# Patient Record
Sex: MALE | Race: WHITE | NOT HISPANIC OR LATINO | Employment: OTHER | URBAN - METROPOLITAN AREA
[De-identification: names, ages, dates, MRNs, and addresses within clinical notes are randomized per-mention and may not be internally consistent; named-entity substitution may affect disease eponyms.]

---

## 2017-02-03 ENCOUNTER — ALLSCRIPTS OFFICE VISIT (OUTPATIENT)
Dept: OTHER | Facility: OTHER | Age: 62
End: 2017-02-03

## 2017-02-10 ENCOUNTER — GENERIC CONVERSION - ENCOUNTER (OUTPATIENT)
Dept: OTHER | Facility: OTHER | Age: 62
End: 2017-02-10

## 2017-03-10 ENCOUNTER — GENERIC CONVERSION - ENCOUNTER (OUTPATIENT)
Dept: OTHER | Facility: OTHER | Age: 62
End: 2017-03-10

## 2017-03-17 ENCOUNTER — GENERIC CONVERSION - ENCOUNTER (OUTPATIENT)
Dept: OTHER | Facility: OTHER | Age: 62
End: 2017-03-17

## 2017-04-21 ENCOUNTER — GENERIC CONVERSION - ENCOUNTER (OUTPATIENT)
Dept: OTHER | Facility: OTHER | Age: 62
End: 2017-04-21

## 2017-05-05 ENCOUNTER — ALLSCRIPTS OFFICE VISIT (OUTPATIENT)
Dept: OTHER | Facility: OTHER | Age: 62
End: 2017-05-05

## 2017-06-03 DIAGNOSIS — I48.91 ATRIAL FIBRILLATION (HCC): ICD-10-CM

## 2017-06-03 DIAGNOSIS — G47.33 OBSTRUCTIVE SLEEP APNEA: ICD-10-CM

## 2017-06-03 DIAGNOSIS — Z00.00 ENCOUNTER FOR GENERAL ADULT MEDICAL EXAMINATION WITHOUT ABNORMAL FINDINGS: ICD-10-CM

## 2017-06-03 DIAGNOSIS — Z86.39 PERSONAL HISTORY OF OTHER ENDOCRINE, NUTRITIONAL AND METABOLIC DISEASE: ICD-10-CM

## 2017-06-03 DIAGNOSIS — E78.5 HYPERLIPIDEMIA: ICD-10-CM

## 2017-06-03 DIAGNOSIS — I10 ESSENTIAL (PRIMARY) HYPERTENSION: ICD-10-CM

## 2017-07-07 ENCOUNTER — ALLSCRIPTS OFFICE VISIT (OUTPATIENT)
Dept: OTHER | Facility: OTHER | Age: 62
End: 2017-07-07

## 2017-07-07 DIAGNOSIS — D64.9 ANEMIA: ICD-10-CM

## 2017-07-07 DIAGNOSIS — R05.9 COUGH: ICD-10-CM

## 2017-07-08 ENCOUNTER — APPOINTMENT (OUTPATIENT)
Dept: LAB | Facility: CLINIC | Age: 62
End: 2017-07-08
Payer: COMMERCIAL

## 2017-07-08 DIAGNOSIS — D64.9 ANEMIA: ICD-10-CM

## 2017-07-08 LAB
BASOPHILS # BLD AUTO: 0.07 THOUSANDS/ΜL (ref 0–0.1)
BASOPHILS NFR BLD AUTO: 1 % (ref 0–1)
EOSINOPHIL # BLD AUTO: 0.17 THOUSAND/ΜL (ref 0–0.61)
EOSINOPHIL NFR BLD AUTO: 3 % (ref 0–6)
ERYTHROCYTE [DISTWIDTH] IN BLOOD BY AUTOMATED COUNT: 14.9 % (ref 11.6–15.1)
FERRITIN SERPL-MCNC: 9 NG/ML (ref 8–388)
HCT VFR BLD AUTO: 40.5 % (ref 36.5–49.3)
HEMOCCULT STL QL IA: NEGATIVE
HGB BLD-MCNC: 13.1 G/DL (ref 12–17)
IRON SERPL-MCNC: 73 UG/DL (ref 65–175)
LYMPHOCYTES # BLD AUTO: 2.24 THOUSANDS/ΜL (ref 0.6–4.47)
LYMPHOCYTES NFR BLD AUTO: 33 % (ref 14–44)
MCH RBC QN AUTO: 27.2 PG (ref 26.8–34.3)
MCHC RBC AUTO-ENTMCNC: 32.3 G/DL (ref 31.4–37.4)
MCV RBC AUTO: 84 FL (ref 82–98)
MONOCYTES # BLD AUTO: 0.46 THOUSAND/ΜL (ref 0.17–1.22)
MONOCYTES NFR BLD AUTO: 7 % (ref 4–12)
NEUTROPHILS # BLD AUTO: 3.84 THOUSANDS/ΜL (ref 1.85–7.62)
NEUTS SEG NFR BLD AUTO: 56 % (ref 43–75)
NRBC BLD AUTO-RTO: 0 /100 WBCS
PLATELET # BLD AUTO: 333 THOUSANDS/UL (ref 149–390)
PMV BLD AUTO: 10.4 FL (ref 8.9–12.7)
RBC # BLD AUTO: 4.82 MILLION/UL (ref 3.88–5.62)
WBC # BLD AUTO: 6.8 THOUSAND/UL (ref 4.31–10.16)

## 2017-07-08 PROCEDURE — 36415 COLL VENOUS BLD VENIPUNCTURE: CPT

## 2017-07-08 PROCEDURE — G0328 FECAL BLOOD SCRN IMMUNOASSAY: HCPCS

## 2017-07-08 PROCEDURE — 85025 COMPLETE CBC W/AUTO DIFF WBC: CPT

## 2017-07-08 PROCEDURE — 83540 ASSAY OF IRON: CPT

## 2017-07-08 PROCEDURE — 82728 ASSAY OF FERRITIN: CPT

## 2017-07-12 ENCOUNTER — TRANSCRIBE ORDERS (OUTPATIENT)
Dept: MRI IMAGING | Facility: CLINIC | Age: 62
End: 2017-07-12

## 2017-07-12 ENCOUNTER — APPOINTMENT (OUTPATIENT)
Dept: RADIOLOGY | Facility: CLINIC | Age: 62
End: 2017-07-12
Payer: COMMERCIAL

## 2017-07-12 DIAGNOSIS — R05.9 COUGH: ICD-10-CM

## 2017-07-12 PROCEDURE — 71020 HB CHEST X-RAY 2VW FRONTAL&LATL: CPT

## 2017-07-21 ENCOUNTER — GENERIC CONVERSION - ENCOUNTER (OUTPATIENT)
Dept: OTHER | Facility: OTHER | Age: 62
End: 2017-07-21

## 2017-09-20 ENCOUNTER — GENERIC CONVERSION - ENCOUNTER (OUTPATIENT)
Dept: OTHER | Facility: OTHER | Age: 62
End: 2017-09-20

## 2017-10-06 RX ORDER — OMEPRAZOLE 40 MG/1
40 CAPSULE, DELAYED RELEASE ORAL DAILY
COMMUNITY
End: 2018-11-30 | Stop reason: SDUPTHER

## 2017-10-06 RX ORDER — TRAMADOL HYDROCHLORIDE 50 MG/1
50 TABLET ORAL EVERY 6 HOURS PRN
COMMUNITY
End: 2021-10-28 | Stop reason: SDUPTHER

## 2017-10-06 RX ORDER — ATORVASTATIN CALCIUM 40 MG/1
40 TABLET, FILM COATED ORAL DAILY
COMMUNITY
End: 2018-04-09 | Stop reason: SDUPTHER

## 2017-10-06 RX ORDER — FLECAINIDE ACETATE 100 MG/1
100 TABLET ORAL 2 TIMES DAILY
COMMUNITY

## 2017-10-06 RX ORDER — FEXOFENADINE HCL 180 MG/1
180 TABLET ORAL DAILY
COMMUNITY
End: 2018-04-09 | Stop reason: SDUPTHER

## 2017-10-06 RX ORDER — METAXALONE 800 MG/1
800 TABLET ORAL 3 TIMES DAILY
COMMUNITY
End: 2018-08-20 | Stop reason: SDUPTHER

## 2017-10-06 RX ORDER — ALPRAZOLAM 0.25 MG/1
TABLET ORAL
COMMUNITY
End: 2018-04-09 | Stop reason: SDUPTHER

## 2017-10-06 RX ORDER — FENOFIBRATE 145 MG/1
145 TABLET, COATED ORAL DAILY
COMMUNITY
End: 2018-04-09 | Stop reason: SDUPTHER

## 2017-10-06 RX ORDER — ESCITALOPRAM OXALATE 10 MG/1
5 TABLET ORAL DAILY
COMMUNITY
End: 2018-01-27 | Stop reason: SDUPTHER

## 2017-10-06 RX ORDER — DILTIAZEM HYDROCHLORIDE 120 MG/1
120 TABLET, FILM COATED ORAL EVERY 6 HOURS SCHEDULED
COMMUNITY
End: 2018-04-09 | Stop reason: SDUPTHER

## 2017-10-09 ENCOUNTER — ANESTHESIA EVENT (OUTPATIENT)
Dept: PERIOP | Facility: HOSPITAL | Age: 62
End: 2017-10-09
Payer: COMMERCIAL

## 2017-10-09 NOTE — ANESTHESIA PREPROCEDURE EVALUATION
Review of Systems/Medical History  Patient summary reviewed  Chart reviewed      Cardiovascular  Exercise tolerance: good,  Hyperlipidemia, Hypertension controlled, Dysrhythmias, atrial fibrillation,    Pulmonary  Sleep apnea CPAP, ,        GI/Hepatic    GERD ,        Negative  ROS        Endo/Other  Negative endo/other ROS Diabetes well controlled type 2 ,      GYN       Hematology  Negative hematology ROS      Musculoskeletal  Negative musculoskeletal ROS        Neurology  Negative neurology ROS      Psychology   Depression ,            Physical Exam    Airway    Mallampati score: II  TM Distance: >3 FB  Neck ROM: full     Dental   Comment: Some missing without loss    ,     Cardiovascular  Rhythm: regular, Rate: normal,     Pulmonary  Breath sounds clear to auscultation,     Other Findings        Anesthesia Plan  ASA Score- 3       Anesthesia Type- IV sedation with anesthesia        Induction- intravenous      Informed Consent  Anesthetic plan and risks discussed with patient

## 2017-10-10 ENCOUNTER — ANESTHESIA (OUTPATIENT)
Dept: PERIOP | Facility: HOSPITAL | Age: 62
End: 2017-10-10
Payer: COMMERCIAL

## 2017-10-10 ENCOUNTER — GENERIC CONVERSION - ENCOUNTER (OUTPATIENT)
Dept: OTHER | Facility: OTHER | Age: 62
End: 2017-10-10

## 2017-10-10 ENCOUNTER — HOSPITAL ENCOUNTER (OUTPATIENT)
Facility: HOSPITAL | Age: 62
Setting detail: OUTPATIENT SURGERY
Discharge: HOME | End: 2017-10-10
Attending: INTERNAL MEDICINE | Admitting: INTERNAL MEDICINE
Payer: COMMERCIAL

## 2017-10-10 VITALS
DIASTOLIC BLOOD PRESSURE: 73 MMHG | HEART RATE: 61 BPM | RESPIRATION RATE: 27 BRPM | SYSTOLIC BLOOD PRESSURE: 131 MMHG | TEMPERATURE: 97.6 F | BODY MASS INDEX: 31.98 KG/M2 | WEIGHT: 210.98 LBS | HEIGHT: 68 IN | OXYGEN SATURATION: 97 %

## 2017-10-10 LAB — GLUCOSE SERPL-MCNC: 98 MG/DL (ref 65–140)

## 2017-10-10 PROCEDURE — 82948 REAGENT STRIP/BLOOD GLUCOSE: CPT

## 2017-10-10 RX ORDER — SODIUM CHLORIDE, SODIUM LACTATE, POTASSIUM CHLORIDE, CALCIUM CHLORIDE 600; 310; 30; 20 MG/100ML; MG/100ML; MG/100ML; MG/100ML
125 INJECTION, SOLUTION INTRAVENOUS CONTINUOUS
Status: DISCONTINUED | OUTPATIENT
Start: 2017-10-10 | End: 2017-10-10 | Stop reason: HOSPADM

## 2017-10-10 RX ORDER — LIDOCAINE HYDROCHLORIDE 10 MG/ML
INJECTION, SOLUTION INFILTRATION; PERINEURAL AS NEEDED
Status: DISCONTINUED | OUTPATIENT
Start: 2017-10-10 | End: 2017-10-10 | Stop reason: SURG

## 2017-10-10 RX ORDER — SODIUM CHLORIDE, SODIUM LACTATE, POTASSIUM CHLORIDE, CALCIUM CHLORIDE 600; 310; 30; 20 MG/100ML; MG/100ML; MG/100ML; MG/100ML
100 INJECTION, SOLUTION INTRAVENOUS CONTINUOUS
Status: DISCONTINUED | OUTPATIENT
Start: 2017-10-10 | End: 2017-10-10 | Stop reason: HOSPADM

## 2017-10-10 RX ORDER — SODIUM CHLORIDE, SODIUM LACTATE, POTASSIUM CHLORIDE, CALCIUM CHLORIDE 600; 310; 30; 20 MG/100ML; MG/100ML; MG/100ML; MG/100ML
INJECTION, SOLUTION INTRAVENOUS CONTINUOUS PRN
Status: DISCONTINUED | OUTPATIENT
Start: 2017-10-10 | End: 2017-10-10 | Stop reason: SURG

## 2017-10-10 RX ORDER — PROPOFOL 10 MG/ML
INJECTION, EMULSION INTRAVENOUS AS NEEDED
Status: DISCONTINUED | OUTPATIENT
Start: 2017-10-10 | End: 2017-10-10 | Stop reason: SURG

## 2017-10-10 RX ADMIN — PROPOFOL 30 MG: 10 INJECTION, EMULSION INTRAVENOUS at 08:53

## 2017-10-10 RX ADMIN — SODIUM CHLORIDE, SODIUM LACTATE, POTASSIUM CHLORIDE, AND CALCIUM CHLORIDE: .6; .31; .03; .02 INJECTION, SOLUTION INTRAVENOUS at 08:45

## 2017-10-10 RX ADMIN — PROPOFOL 120 MG: 10 INJECTION, EMULSION INTRAVENOUS at 08:50

## 2017-10-10 RX ADMIN — LIDOCAINE HYDROCHLORIDE 50 MG: 10 INJECTION, SOLUTION INFILTRATION; PERINEURAL at 08:50

## 2017-10-10 NOTE — OP NOTE
**** GI/ENDOSCOPY REPORT ****     PATIENT NAME: Anuja Armstrong - VISIT ID:  Patient ID:   [id number] YOB: 1955     INTRODUCTION: Esophagogastroduodenoscopy - A 58 male patient presents for   an outpatient Esophagogastroduodenoscopy at [facility]  INDICATIONS: GERD  CONSENT: The benefits, risks, and alternatives to the procedure were   discussed and informed consent was obtained from the patient  PREPARATION:  EKG, pulse, pulse oximetry and blood pressure were monitored   throughout the procedure  MEDICATIONS:asa 2     PROCEDURE:  The endoscope was passed without difficulty through the mouth   under direct visualization and advanced to the 3rd portion of the   duodenum  The scope was withdrawn and the mucosa was carefully examined  FINDINGS:  The EGD examination was completely normal   Esophagus: The   esophagus appeared to be normal  The Z line was visualized at 42 cm from   the entry site  Stomach: The antrum, body of the stomach, cardia, fundus,   incisura, and pylorus appeared to be normal   Duodenum: The duodenal bulb,   2nd portion of the duodenum, and 3rd portion of the duodenum appeared to   be normal      COMPLICATIONS: There were no complications  IMPRESSIONS: Normal EGD  Normal esophagus  Z line visualized  Normal   antrum, body of the stomach, cardia, fundus, incisura, and pylorus  Normal   duodenal bulb, 2nd portion of the duodenum, and 3rd portion of the   duodenum  RECOMMENDATIONS: Continue current medications  ESTIMATED BLOOD LOSS: None  PATHOLOGY SPECIMENS: No     PROCEDURE CODES: 90264 - EGD flexible; incl brushing or washing     ICD-9 Codes: 530 81 Esophageal reflux     ICD-10 Codes: K21 Gastro-esophageal reflux disease     PERFORMED BY: Dr Cydney Hilt, M D [place] [facility]  on 10/10/2017  Version 1, electronically signed by JOVANI Soler , D O  on   10/10/2017 at 08:56

## 2017-10-10 NOTE — ANESTHESIA POSTPROCEDURE EVALUATION
Post-Op Assessment Note      CV Status:  Stable    Mental Status:  Alert and awake    Hydration Status:  Euvolemic    PONV Controlled:  Controlled    Airway Patency:  Patent    Post Op Vitals Reviewed: Yes          Staff: CRNA           /56 (10/10/17 0854)    Temp     Pulse 68 (10/10/17 0854)   Resp 16 (10/10/17 0854)    SpO2 100 % (10/10/17 0854)

## 2017-11-07 DIAGNOSIS — I48.91 ATRIAL FIBRILLATION (HCC): ICD-10-CM

## 2017-11-07 DIAGNOSIS — I10 ESSENTIAL (PRIMARY) HYPERTENSION: ICD-10-CM

## 2017-11-07 DIAGNOSIS — R05.9 COUGH: ICD-10-CM

## 2017-11-07 DIAGNOSIS — E78.5 HYPERLIPIDEMIA: ICD-10-CM

## 2017-11-07 DIAGNOSIS — Z00.00 ENCOUNTER FOR GENERAL ADULT MEDICAL EXAMINATION WITHOUT ABNORMAL FINDINGS: ICD-10-CM

## 2017-11-07 DIAGNOSIS — Z86.39 PERSONAL HISTORY OF OTHER ENDOCRINE, NUTRITIONAL AND METABOLIC DISEASE: ICD-10-CM

## 2017-11-07 DIAGNOSIS — D64.9 ANEMIA: ICD-10-CM

## 2017-11-17 ENCOUNTER — ALLSCRIPTS OFFICE VISIT (OUTPATIENT)
Dept: OTHER | Facility: OTHER | Age: 62
End: 2017-11-17

## 2017-11-18 NOTE — PROGRESS NOTES
Assessment    1  Atrial fibrillation (427 31) (I48 91)   2  Benign essential hypertension (401 1) (I10)   3  Controlled type 2 diabetes mellitus (250 00) (E11 9)   4  Hyperlipidemia (272 4) (E78 5)   5  Anxiety and depression (300 00,311) (F41 8)    Plan  Atrial fibrillation, Benign essential hypertension, Controlled type 2 diabetes mellitus, Hyperlipidemia    · (1) BASIC METABOLIC PROFILE; Status:Active; Requested for:17Mar2018;    · (1) CBC/PLT/DIFF; Status:Active; Requested for:17Mar2018;    · (1) HEMOGLOBIN A1C; Status:Active; Requested for:17Mar2018;    · (1) LIPID PANEL, FASTING; Status:Active; Requested for:17Mar2018;    · Follow-up visit in 4 Months Evaluation and Treatment  Follow-up  Status: Hold For - Scheduling Requested for: 19OBH7487  Benign essential hypertension    · DilTIAZem HCl - 120 MG Oral Tablet; Take 1 tablet daily  Controlled type 2 diabetes mellitus    · From  MetFORMIN HCl - 500 MG Oral Tablet take 1 tablet by mouth twice a day ToMetFORMIN HCl - 500 MG Oral Tablet take one tablet by mouth twice daily  Depression    · Escitalopram Oxalate 10 MG Oral Tablet; Take 1 tablet daily  Esophageal reflux    · Omeprazole 40 MG Oral Capsule Delayed Release; take 1 capsule daily  Health Maintenance    · Fexofenadine HCl - 180 MG Oral Tablet; TAKE 1 TABLET DAILY  Hypercholesterolemia    · Atorvastatin Calcium 40 MG Oral Tablet; TAKE ONE TABLET BY MOUTH ONCE DAILY  Hyperlipidemia    · Fenofibrate 145 MG Oral Tablet (Tricor); TAKE 1 TABLET DAILY    Discussion/Summary  Discussion Summary:   His labs were reviewed  His A1c is 6 3  His LDL cholesterol was below 100  His HDLs are 43  cholesterol is 140   has no cardiac complaints and continues to be followed by the cardiologist   is no longer anticoagulated  see him back in 4 months  He will continue dietary measures  He will continue his metformin  will get a shingles vaccine  Chief Complaint  Chief Complaint Free Text Note Form: Problems are 1  Paroxysmal atrial fibrillation 2  Diabetes 3  Hyperlipidemia 4  Hypertension 5  Anxiety      History of Present Illness  HPI: He is doing fairly well  His cardiac symptoms have abated  He is in sinus rhythm now  sees Dr Velasquez Lynn for his prostate  Review of Systems  Complete-Male:  Constitutional: No fever or chills, feels well, no tiredness, no recent weight gain or weight loss  Eyes: No complaints of eye pain, no red eyes, no discharge from eyes, no itchy eyes  ENT: no complaints of earache, no hearing loss, no nosebleeds, no nasal discharge, no sore throat, no hoarseness  Cardiovascular: No complaints of slow heart rate, no fast heart rate, no chest pain, no palpitations, no leg claudication, no lower extremity  Respiratory: No complaints of shortness of breath, no wheezing, no cough, no SOB on exertion, no orthopnea or PND  Gastrointestinal: No complaints of abdominal pain, no constipation, no nausea or vomiting, no diarrhea or bloody stools  Genitourinary: No complaints of dysuria, no incontinence, no hesitancy, no nocturia, no genital lesion, no testicular pain  Musculoskeletal: No complaints of arthralgia, no myalgias, no joint swelling or stiffness, no limb pain or swelling  Integumentary: No complaints of skin rash or skin lesions, no itching, no skin wound, no dry skin  Neurological: No compliants of headache, no confusion, no convulsions, no numbness or tingling, no dizziness or fainting, no limb weakness, no difficulty walking  Psychiatric: Is not suicidal, no sleep disturbances, no anxiety or depression, no change in personality, no emotional problems  Endocrine: No complaints of proptosis, no hot flashes, no muscle weakness, no erectile dysfunction, no deepening of the voice, no feelings of weakness  Hematologic/Lymphatic: No complaints of swollen glands, no swollen glands in the neck, does not bleed easily, no easy bruising  Active Problems  1   Acute bronchitis (466 0) (J20 9)   2  Anemia (285 9) (D64 9)   3  Atrial fibrillation (427 31) (I48 91)   4  Benign essential hypertension (401 1) (I10)   5  Chronic cough (786 2) (R05)   6  Controlled type 2 diabetes mellitus (250 00) (E11 9)   7  Depression (311) (F32 9)   8  Elevated PSA (790 93) (R97 20)   9  Enlarged prostate without lower urinary tract symptoms (luts) (600 00) (N40 0)   10  Esophageal reflux (530 81) (K21 9)   11  Hypercholesterolemia (272 0) (E78 00)   12  Hyperlipidemia (272 4) (E78 5)   13  Obesity (278 00) (E66 9)   14  LUKE (obstructive sleep apnea) (327 23) (G47 33)   15  LUKE on CPAP (327 23,V46 8) (G47 33,Z99 89)    Past Medical History  1  History of Candidiasis, cutaneous (112 3) (B37 2)   2  History of Colon, diverticulosis (562 10) (K57 30)   3  History of Encounter for screening for malignant neoplasm of colon (V76 51) (Z12 11)   4  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)   5  History of dizziness (V13 89) (Z87 898)   6  History of shortness of breath (V13 89) (Z87 898)   7  History of Internal Hemorrhoids (455 0)   8  History of Pneumonia (V12 61)   9  History of Reported Hearing Problems Using Hearing Aid Both Ears   10  History of Vertigo Of Central Origin (386 2)   11  History of Visit For: Single Organ System Exam Eyes (V72 0)  Active Problems And Past Medical History Reviewed: The active problems and past medical history were reviewed and updated today  Surgical History  Surgical History Reviewed: The surgical history was reviewed and updated today  Family History  Mother    1  Family history of CABG   2  Family history of Coronary Artery Disease (V17 49)   3  Family history of Hepatic Disorders  Father    4  Family history of CABG   5  Family history of Diabetes Mellitus (V18 0)  Family History    6  Family history of Gastrointestinal Cancer  Family History Reviewed: The family history was reviewed and updated today         Social History     · Never A Smoker   · Tobacco non-user (V49 89) (Z78 9)   · Under Stress  Social History Reviewed: The social history was reviewed and updated today  The social history was reviewed and is unchanged  Current Meds   1  ALPRAZolam 0 25 MG Oral Tablet; TAKE 1 TABLET EVERY 12 HOURS AS NEEDED; Therapy: 09DJH7354 to (Last Rx:46Bba8800) Ordered   2  Atorvastatin Calcium 40 MG Oral Tablet; TAKE ONE TABLET BY MOUTH ONCE DAILY; Therapy: 49XJL1788 to (Last Rx:23Zfl8330)  Requested for: 60VIE7975 Ordered   3  Rey Aspirin 325 MG TBEC; One tab daily; Last Rx:72Mgk4210 Ordered   4  DilTIAZem HCl - 120 MG Oral Tablet; Take 1 tablet daily  Requested for: 86PFH4082; Last Rx:14Unt8638 Ordered   5  Escitalopram Oxalate 10 MG Oral Tablet; Take 1 tablet daily; Therapy: 78VQJ7872 to (Evaluate:15Dih6547)  Requested for: 22Qvs2479; Last Rx:53Ujs2302 Ordered   6  Fenofibrate 145 MG Oral Tablet; TAKE 1 TABLET DAILY; Therapy: 33Nvd2140 to (Evaluate:03Jan2018)  Requested for: 64KRZ9603; Last Rx:14Iow2250 Ordered   7  Fexofenadine HCl - 180 MG Oral Tablet; TAKE 1 TABLET DAILY  Requested for: 61RLZ2260; Last Rx:04Mxx8335 Ordered   8  Flecainide Acetate 100 MG Oral Tablet; 1/2 tab bid; Therapy: 75Ccs1494 to (Last Rx:51Jej9626)  Requested for: 52NHR1860 Ordered   9  Meclizine HCl - 25 MG Oral Tablet; TAKE 1 TABLET EVERY 8 TO 12 HOURS AS NEEDED; Therapy: 88BON4652 to (Marquez Deutsch)  Requested for: 85KZV7267; Last Rx:20Grc7532 Ordered   10  Metaxalone 800 MG Oral Tablet; Therapy: 09ONQ0029 to (Evaluate:55Eyr4849) Recorded   11  MetFORMIN HCl - 500 MG Oral Tablet; take 1 tablet by mouth twice a day; Therapy: 57ZKC9147 to (Last Rx:43Wtg0983)  Requested for: 26DAU6412 Ordered   12  Omeprazole 40 MG Oral Capsule Delayed Release; take 1 capsule daily; Therapy: 80SKP3814 to (SRFDILLM:36MNK9032)  Requested for: 12EMS9068; Last Rx:60Efv3246 Ordered   13   TraMADol HCl - 50 MG Oral Tablet; TAKE 1 TO 2 TABLETS EVERY 6 HOURS AS NEEDED FOR PAIN;  Therapy: 10GWQ9320 to (Kamilla Maldonado)  Requested for: 91CDP4589; Last Rx:92Yzj8866  Ordered  Medication List Reviewed: The medication list was reviewed and updated today  Allergies  1  No Known Drug Allergies    Vitals  Vital Signs    Recorded: 08HIS4056 08:22AM   Heart Rate 69   Systolic 015   Diastolic 82   Height 5 ft 8 in   Weight 212 lb    BMI Calculated 32 23   BSA Calculated 2 1   O2 Saturation 99       Physical Exam   Constitutional  General appearance: No acute distress, well appearing and well nourished  Eyes  Conjunctiva and lids: No swelling, erythema, or discharge  Pupils and irises: Equal, round and reactive to light  Ears, Nose, Mouth, and Throat  External inspection of ears and nose: Normal    Otoscopic examination: Tympanic membrance translucent with normal light reflex  Canals patent without erythema  Nasal mucosa, septum, and turbinates: Normal without edema or erythema  Oropharynx: Normal with no erythema, edema, exudate or lesions  Pulmonary  Respiratory effort: No increased work of breathing or signs of respiratory distress  Auscultation of lungs: Clear to auscultation, equal breath sounds bilaterally, no wheezes, no rales, no rhonci  Cardiovascular  Palpation of heart: Normal PMI, no thrills  Auscultation of heart: Normal rate and rhythm, normal S1 and S2, without murmurs  Examination of extremities for edema and/or varicosities: Normal    Carotid pulses: Normal    Abdomen  Abdomen: Non-tender, no masses  Liver and spleen: No hepatomegaly or splenomegaly  Lymphatic  Palpation of lymph nodes in neck: No lymphadenopathy  Musculoskeletal  Gait and station: Normal    Digits and nails: Normal without clubbing or cyanosis  Inspection/palpation of joints, bones, and muscles: Normal    Skin  Skin and subcutaneous tissue: Normal without rashes or lesions  Neurologic  Cranial nerves: Cranial nerves 2-12 intact  Reflexes: 2+ and symmetric  Sensation: No sensory loss     Psychiatric Orientation to person, place and time: Normal    Mood and affect: Normal          Health Management  Health Maintenance   Afluria Intramuscular Suspension; every 1 year; Last 06GLG9029; Next Due: 31FBB8037; Overdue  COLONOSCOPY; every 5 years; Next Due: 22ZOV8744;  Overdue    Signatures   Electronically signed by : Gayatri Caro DO; Nov 17 2017  8:57AM EST                       (Author)

## 2018-01-12 VITALS
HEIGHT: 68 IN | HEART RATE: 69 BPM | WEIGHT: 212 LBS | DIASTOLIC BLOOD PRESSURE: 82 MMHG | SYSTOLIC BLOOD PRESSURE: 136 MMHG | OXYGEN SATURATION: 99 % | BODY MASS INDEX: 32.13 KG/M2

## 2018-01-12 VITALS
DIASTOLIC BLOOD PRESSURE: 80 MMHG | HEIGHT: 66 IN | BODY MASS INDEX: 33.79 KG/M2 | SYSTOLIC BLOOD PRESSURE: 140 MMHG | WEIGHT: 210.25 LBS | HEART RATE: 83 BPM

## 2018-01-13 VITALS
DIASTOLIC BLOOD PRESSURE: 80 MMHG | BODY MASS INDEX: 32.89 KG/M2 | SYSTOLIC BLOOD PRESSURE: 118 MMHG | WEIGHT: 217 LBS | HEART RATE: 74 BPM | HEIGHT: 68 IN

## 2018-01-14 VITALS
SYSTOLIC BLOOD PRESSURE: 130 MMHG | HEIGHT: 68 IN | OXYGEN SATURATION: 98 % | DIASTOLIC BLOOD PRESSURE: 80 MMHG | HEART RATE: 62 BPM | BODY MASS INDEX: 32.74 KG/M2 | WEIGHT: 216 LBS

## 2018-01-27 DIAGNOSIS — F32.A DEPRESSION, UNSPECIFIED DEPRESSION TYPE: Primary | ICD-10-CM

## 2018-01-29 RX ORDER — ESCITALOPRAM OXALATE 10 MG/1
TABLET ORAL
Qty: 90 TABLET | Refills: 0 | Status: SHIPPED | OUTPATIENT
Start: 2018-01-29 | End: 2018-04-09 | Stop reason: SDUPTHER

## 2018-03-17 DIAGNOSIS — E11.9 TYPE 2 DIABETES MELLITUS WITHOUT COMPLICATIONS (HCC): ICD-10-CM

## 2018-03-17 DIAGNOSIS — I48.91 ATRIAL FIBRILLATION (HCC): ICD-10-CM

## 2018-03-17 DIAGNOSIS — I10 ESSENTIAL (PRIMARY) HYPERTENSION: ICD-10-CM

## 2018-03-17 DIAGNOSIS — E78.5 HYPERLIPIDEMIA: ICD-10-CM

## 2018-03-26 ENCOUNTER — OFFICE VISIT (OUTPATIENT)
Dept: INTERNAL MEDICINE CLINIC | Facility: CLINIC | Age: 63
End: 2018-03-26
Payer: COMMERCIAL

## 2018-03-26 ENCOUNTER — HOSPITAL ENCOUNTER (OUTPATIENT)
Dept: NON INVASIVE DIAGNOSTICS | Facility: CLINIC | Age: 63
Discharge: HOME | End: 2018-03-26
Payer: COMMERCIAL

## 2018-03-26 VITALS
OXYGEN SATURATION: 98 % | HEIGHT: 68 IN | HEART RATE: 67 BPM | BODY MASS INDEX: 30.46 KG/M2 | DIASTOLIC BLOOD PRESSURE: 90 MMHG | SYSTOLIC BLOOD PRESSURE: 126 MMHG | RESPIRATION RATE: 18 BRPM | WEIGHT: 201 LBS

## 2018-03-26 DIAGNOSIS — E11.9 CONTROLLED TYPE 2 DIABETES MELLITUS WITHOUT COMPLICATION, WITHOUT LONG-TERM CURRENT USE OF INSULIN (HCC): ICD-10-CM

## 2018-03-26 DIAGNOSIS — R09.89 RIGHT CAROTID BRUIT: ICD-10-CM

## 2018-03-26 DIAGNOSIS — I10 BENIGN ESSENTIAL HYPERTENSION: ICD-10-CM

## 2018-03-26 DIAGNOSIS — F32.A ANXIETY AND DEPRESSION: ICD-10-CM

## 2018-03-26 DIAGNOSIS — F41.9 ANXIETY AND DEPRESSION: ICD-10-CM

## 2018-03-26 DIAGNOSIS — I80.3 PHLEBITIS AND THROMBOPHLEBITIS OF LOWER EXTREMITIES: ICD-10-CM

## 2018-03-26 DIAGNOSIS — R09.89 RIGHT CAROTID BRUIT: Primary | ICD-10-CM

## 2018-03-26 PROCEDURE — 93970 EXTREMITY STUDY: CPT

## 2018-03-26 PROCEDURE — 93970 EXTREMITY STUDY: CPT | Performed by: SURGERY

## 2018-03-26 PROCEDURE — 99214 OFFICE O/P EST MOD 30 MIN: CPT | Performed by: INTERNAL MEDICINE

## 2018-03-27 PROBLEM — F32.A ANXIETY AND DEPRESSION: Status: ACTIVE | Noted: 2017-11-17

## 2018-03-27 PROBLEM — F41.9 ANXIETY AND DEPRESSION: Status: ACTIVE | Noted: 2017-11-17

## 2018-03-27 PROBLEM — E11.9 CONTROLLED TYPE 2 DIABETES MELLITUS (HCC): Status: ACTIVE | Noted: 2017-07-07

## 2018-03-27 LAB
ANION GAP SERPL CALCULATED.3IONS-SCNC: 12.2 MM/L
BASOPHILS # BLD AUTO: 0.1 X3/UL (ref 0–0.3)
BASOPHILS # BLD AUTO: 2 % (ref 0–2)
BUN SERPL-MCNC: 20 MG/DL (ref 7–25)
CALCIUM SERPL-MCNC: 9 MG/DL (ref 8.6–10.5)
CHLORIDE SERPL-SCNC: 107 MM/L (ref 98–107)
CHOLEST SERPL-MCNC: 153 MG/DL (ref 0–200)
CO2 SERPL-SCNC: 27 MM/L (ref 21–31)
CREAT SERPL-MCNC: 1.1 MG/DL (ref 0.7–1.3)
DEPRECATED RDW RBC AUTO: 15.3 % (ref 11.5–14.5)
EGFR (HISTORICAL): > 60 GFR
EGFR AFRICAN AMERICAN (HISTORICAL): > 60 GFR
EOSINOPHIL # BLD AUTO: 0.2 X3/UL (ref 0–0.5)
EOSINOPHIL NFR BLD AUTO: 2.8 % (ref 0–5)
EST. AVERAGE GLUCOSE BLD GHB EST-MCNC: 135 MG/DL
GLUCOSE (HISTORICAL): 112 MG/DL (ref 65–99)
HBA1C MFR BLD HPLC: 6.3 % (ref 4–6.2)
HCT VFR BLD AUTO: 39.1 % (ref 42–52)
HDLC SERPL-MCNC: 41 MG/DL (ref 40–60)
HGB BLD-MCNC: 12.7 G/DL (ref 14–18)
LDLC SERPL CALC-MCNC: 95.9 MG/DL (ref 75–193)
LYMPHOCYTES # BLD AUTO: 2.1 X3/UL (ref 1.2–4.2)
LYMPHOCYTES NFR BLD AUTO: 37.9 % (ref 20.5–51.1)
MCH RBC QN AUTO: 26.5 PG (ref 26–34)
MCHC RBC AUTO-ENTMCNC: 32.5 G/DL (ref 31–36)
MCV RBC AUTO: 81.7 FL (ref 81–99)
MONOCYTES # BLD AUTO: 0.5 X3/UL (ref 0–1)
MONOCYTES NFR BLD AUTO: 8.2 % (ref 1.7–12)
NEUTROPHILS # BLD AUTO: 2.8 X3/UL (ref 1.4–6.5)
NEUTS SEG NFR BLD AUTO: 49.1 % (ref 42.2–75.2)
OSMOLALITY, SERUM (HISTORICAL): 286 MOSM (ref 262–291)
PLATELET # BLD AUTO: 309 X3/UL (ref 130–400)
PMV BLD AUTO: 9.2 FL (ref 8.6–11.7)
POTASSIUM SERPL-SCNC: 4.2 MM/L (ref 3.5–5.5)
RBC # BLD AUTO: 4.79 X6/UL (ref 4.3–5.9)
SODIUM SERPL-SCNC: 142 MM/L (ref 134–143)
TRIGL SERPL-MCNC: 83 MG/DL (ref 44–166)
VLDL CHOLESTEROL (HISTORICAL): 17 MG/DL (ref 5–51)
WBC # BLD AUTO: 5.6 X3/UL (ref 4.8–10.8)

## 2018-03-27 NOTE — PROGRESS NOTES
Assessment/Plan:  Cardiac status seems stable with him being in a regular rhythm at the present time  Of concern was the discomfort in his right leg  He was sent over for an ultrasound which did show superficial phlebitis of his right leg  It appeared to be acute  He is going to continue his Xarelto for the moment  She is going to come back and have me recheck the leg in about 2 weeks  He apparently has never had DVT  He appears to be in sinus rhythm at the present time  No problem-specific Assessment & Plan notes found for this encounter  Diagnoses and all orders for this visit:    Right carotid bruit  -     VAS carotid complete study; Future    Phlebitis and thrombophlebitis of lower extremities (HCC)  -     VAS lower limb venous duplex study, complete bilateral; Future          Subjective:  Problems are multiple and include 1  Right leg pain 2  Hyperlipidemia 3  Depression 4  Cardiac arrhythmia 5  Type 2 diabetes     Patient ID: Rosa Elena Cannon is a 61 y o  male  HPI he comes in for follow-up  He has been having problems with his right leg  He said that several weeks ago it the developed some erythema and tenderness  He has a history of superficial phlebitis of the right leg  He started himself back on Xarelto  It feels better today  He otherwise has been doing fairly well  He has the problems as listed  Blood sugars are said to be under control  He is currently on Xarelto  He has been on that in the past but discontinued it by the cardiologist because of stability of his cardiac arrhythmia  The following portions of the patient's history were reviewed and updated as appropriate: allergies, current medications, past family history, past medical history, past social history, past surgical history and problem list     Review of Systems   Constitutional: Negative for activity change, appetite change, chills, diaphoresis, fatigue, fever and unexpected weight change  Other than his leg pain he has been feeling fairly well  HENT: Negative for congestion, ear pain, hearing loss, mouth sores, nosebleeds, postnasal drip, sinus pain, sinus pressure, sore throat and trouble swallowing  Eyes: Negative for pain, discharge and visual disturbance  Respiratory: Negative for apnea, cough, chest tightness, shortness of breath and wheezing  Cardiovascular: Negative for chest pain, palpitations and leg swelling  He has been having to discomfort in the medial aspect of his right leg both in the calf and now above the knee  It was slightly erythematous  It was tender but feels better now since he started the Xarelto   Gastrointestinal: Negative for abdominal pain, anal bleeding, blood in stool, constipation, diarrhea, nausea and vomiting  Endocrine: Negative for polydipsia and polyphagia  Genitourinary: Negative for decreased urine volume, dysuria, flank pain, frequency, hematuria and urgency  Musculoskeletal: Negative for arthralgias, back pain, gait problem, joint swelling and myalgias  Skin: Negative for rash and wound  Allergic/Immunologic: Negative for environmental allergies and food allergies  Neurological: Negative for dizziness, tremors, seizures, syncope, speech difficulty, light-headedness, numbness and headaches  Hematological: Negative for adenopathy  Does not bruise/bleed easily  Psychiatric/Behavioral: Negative for agitation, confusion, hallucinations, sleep disturbance and suicidal ideas  The patient is not nervous/anxious  Objective:     Physical Exam   Constitutional: He appears well-developed and well-nourished  No distress  HENT:   Head: Normocephalic  Right Ear: External ear normal    Left Ear: External ear normal    Nose: Nose normal    Mouth/Throat: Oropharynx is clear and moist  No oropharyngeal exudate  Eyes: Conjunctivae and EOM are normal  Pupils are equal, round, and reactive to light   Right eye exhibits no discharge  Left eye exhibits no discharge  Neck: Normal range of motion  Neck supple  No thyromegaly present  Cardiovascular: Normal rate, regular rhythm, normal heart sounds and intact distal pulses  Exam reveals no gallop and no friction rub  No murmur heard  There is no tenderness or signs of phlebitis at the present time  Both calves are the same size by measurement  No skin distortion  Pulmonary/Chest: Effort normal and breath sounds normal  No respiratory distress  He has no wheezes  He has no rales  Abdominal: Soft  Bowel sounds are normal  He exhibits no distension and no mass  There is no tenderness  There is no rebound and no guarding  Musculoskeletal: Normal range of motion  He exhibits no edema, tenderness or deformity  Lymphadenopathy:     He has no cervical adenopathy  Neurological: He is alert  He has normal reflexes  No cranial nerve deficit  Coordination normal    Skin: Skin is warm and dry  No rash noted  No erythema  Psychiatric: He has a normal mood and affect  His behavior is normal  Judgment and thought content normal    Nursing note and vitals reviewed

## 2018-03-28 LAB — HBA1C MFR BLD HPLC: 6.3 %

## 2018-04-09 ENCOUNTER — HOSPITAL ENCOUNTER (OUTPATIENT)
Dept: NON INVASIVE DIAGNOSTICS | Facility: CLINIC | Age: 63
Discharge: HOME | End: 2018-04-09
Payer: COMMERCIAL

## 2018-04-09 ENCOUNTER — OFFICE VISIT (OUTPATIENT)
Dept: INTERNAL MEDICINE CLINIC | Facility: CLINIC | Age: 63
End: 2018-04-09
Payer: COMMERCIAL

## 2018-04-09 VITALS
SYSTOLIC BLOOD PRESSURE: 119 MMHG | BODY MASS INDEX: 30.77 KG/M2 | HEIGHT: 68 IN | HEART RATE: 72 BPM | OXYGEN SATURATION: 98 % | DIASTOLIC BLOOD PRESSURE: 70 MMHG | WEIGHT: 203 LBS

## 2018-04-09 DIAGNOSIS — I48.91 ATRIAL FIBRILLATION, UNSPECIFIED TYPE (HCC): ICD-10-CM

## 2018-04-09 DIAGNOSIS — R09.89 CARDIORESPIRATORY PROBLEMS: ICD-10-CM

## 2018-04-09 DIAGNOSIS — F32.A ANXIETY AND DEPRESSION: ICD-10-CM

## 2018-04-09 DIAGNOSIS — I10 BENIGN ESSENTIAL HYPERTENSION: ICD-10-CM

## 2018-04-09 DIAGNOSIS — F32.A DEPRESSION, UNSPECIFIED DEPRESSION TYPE: ICD-10-CM

## 2018-04-09 DIAGNOSIS — E11.9 CONTROLLED TYPE 2 DIABETES MELLITUS WITHOUT COMPLICATION, WITHOUT LONG-TERM CURRENT USE OF INSULIN (HCC): ICD-10-CM

## 2018-04-09 DIAGNOSIS — F41.9 ANXIETY AND DEPRESSION: ICD-10-CM

## 2018-04-09 DIAGNOSIS — E78.00 HIGH CHOLESTEROL: Primary | ICD-10-CM

## 2018-04-09 DIAGNOSIS — I80.9 RECURRENT PHLEBITIS OF SUPERFICIAL VEIN: ICD-10-CM

## 2018-04-09 PROCEDURE — 93880 EXTRACRANIAL BILAT STUDY: CPT

## 2018-04-09 PROCEDURE — 3078F DIAST BP <80 MM HG: CPT | Performed by: INTERNAL MEDICINE

## 2018-04-09 PROCEDURE — 3074F SYST BP LT 130 MM HG: CPT | Performed by: INTERNAL MEDICINE

## 2018-04-09 PROCEDURE — 99214 OFFICE O/P EST MOD 30 MIN: CPT | Performed by: INTERNAL MEDICINE

## 2018-04-09 RX ORDER — FEXOFENADINE HCL 180 MG/1
180 TABLET ORAL DAILY
Qty: 30 TABLET | Refills: 5 | Status: SHIPPED | OUTPATIENT
Start: 2018-04-09 | End: 2020-12-30 | Stop reason: ALTCHOICE

## 2018-04-09 RX ORDER — ATORVASTATIN CALCIUM 40 MG/1
40 TABLET, FILM COATED ORAL DAILY
Qty: 30 TABLET | Refills: 5 | Status: SHIPPED | OUTPATIENT
Start: 2018-04-09 | End: 2019-02-25 | Stop reason: SDUPTHER

## 2018-04-09 RX ORDER — DILTIAZEM HYDROCHLORIDE 120 MG/1
120 TABLET, FILM COATED ORAL EVERY 6 HOURS SCHEDULED
Qty: 30 TABLET | Refills: 5 | Status: SHIPPED | OUTPATIENT
Start: 2018-04-09 | End: 2018-04-09 | Stop reason: SDUPTHER

## 2018-04-09 RX ORDER — DILTIAZEM HYDROCHLORIDE 120 MG/1
120 TABLET, FILM COATED ORAL EVERY 6 HOURS SCHEDULED
Qty: 30 TABLET | Refills: 2 | Status: SHIPPED | OUTPATIENT
Start: 2018-04-09 | End: 2018-08-20 | Stop reason: SDUPTHER

## 2018-04-09 RX ORDER — ESCITALOPRAM OXALATE 10 MG/1
10 TABLET ORAL DAILY
Qty: 30 TABLET | Refills: 5 | Status: SHIPPED | OUTPATIENT
Start: 2018-04-09 | End: 2019-02-25 | Stop reason: SDUPTHER

## 2018-04-09 RX ORDER — FENOFIBRATE 145 MG/1
145 TABLET, COATED ORAL DAILY
Qty: 30 TABLET | Refills: 5 | Status: SHIPPED | OUTPATIENT
Start: 2018-04-09 | End: 2019-02-25 | Stop reason: SDUPTHER

## 2018-04-09 RX ORDER — ALPRAZOLAM 0.25 MG/1
0.25 TABLET ORAL
Qty: 30 TABLET | Refills: 2 | Status: SHIPPED | OUTPATIENT
Start: 2018-04-09 | End: 2019-01-02 | Stop reason: SDUPTHER

## 2018-04-10 PROCEDURE — 93880 EXTRACRANIAL BILAT STUDY: CPT | Performed by: SURGERY

## 2018-04-10 NOTE — PROGRESS NOTES
Assessment/Plan: At the moment the patient is asymptomatic  His tenderness in his right thigh has completely resolved  The ultrasound did not show involvement of the deep venous system yet he has had 3 or 4 bouts of the phlebitis in the last year  He currently placed himself back on Xarelto which she tolerates reasonably well  I told he needs another opinion from a vascular surgeon and I will do some hematological workup including protein S protein C factor 5 Leiden etc     For the moment he will stay on the Xarelto since the thrombus extends up it to the mid thigh  I will see him back in several weeks  No problem-specific Assessment & Plan notes found for this encounter  Diagnoses and all orders for this visit:    High cholesterol  -     atorvastatin (LIPITOR) 40 mg tablet; Take 1 tablet (40 mg total) by mouth daily  -     Discontinue: diltiazem (CARDIZEM) 120 MG tablet; Take 1 tablet (120 mg total) by mouth every 6 (six) hours  -     fexofenadine (ALLEGRA) 180 MG tablet; Take 1 tablet (180 mg total) by mouth daily  -     fenofibrate (TRICOR) 145 mg tablet; Take 1 tablet (145 mg total) by mouth daily  -     diltiazem (CARDIZEM) 120 MG tablet; Take 1 tablet (120 mg total) by mouth every 6 (six) hours    Atrial fibrillation, unspecified type (HCC)  -     Discontinue: diltiazem (CARDIZEM) 120 MG tablet; Take 1 tablet (120 mg total) by mouth every 6 (six) hours  -     fexofenadine (ALLEGRA) 180 MG tablet; Take 1 tablet (180 mg total) by mouth daily  -     fenofibrate (TRICOR) 145 mg tablet; Take 1 tablet (145 mg total) by mouth daily  -     diltiazem (CARDIZEM) 120 MG tablet; Take 1 tablet (120 mg total) by mouth every 6 (six) hours    Depression, unspecified depression type  -     escitalopram (LEXAPRO) 10 mg tablet; Take 1 tablet (10 mg total) by mouth daily    Benign essential hypertension    Recurrent phlebitis of superficial vein  -     rivaroxaban (XARELTO) 15 mg tablet;  Take 1 tablet (15 mg total) by mouth daily with breakfast  -     Protein C and S Activity With Reflex To Protein C and/or S Antigen; Future  -     Factor 5 leiden; Future  -     Ambulatory referral to Vascular Surgery; Future    Controlled type 2 diabetes mellitus without complication, without long-term current use of insulin (HCC)  -     metFORMIN (GLUCOPHAGE) 500 mg tablet; Take 1 tablet (500 mg total) by mouth 2 (two) times a day with meals    Anxiety and depression  -     ALPRAZolam (XANAX) 0 25 mg tablet; Take 1 tablet (0 25 mg total) by mouth daily at bedtime as needed for anxiety    Other orders  -      Colonoscopy          Subjective:  Recurrent superficial phlebitis of the right leg     Patient ID: Matt Pro is a 61 y o  male  HPI he comes back for follow-up  He has had 3 or 4 bouts of superficial phlebitis of the right leg over the last year  When last seen he had plates and cell back on Xarelto because of his concerns  He was seen here sent over for an ultrasound which showed superficial phlebitis from the midthigh down to the mid calf  There was no extension into the deep veinous Hein system  He has been anticoagulated in the past when he had atrial fibrillation  He tolerates Xarelto well    The following portions of the patient's history were reviewed and updated as appropriate: allergies, current medications, past family history, past medical history, past social history, past surgical history and problem list     Review of Systems   Constitutional: Negative for activity change, appetite change, chills, diaphoresis, fatigue, fever and unexpected weight change  HENT: Negative for congestion, ear pain, hearing loss, mouth sores, nosebleeds, postnasal drip, sinus pain, sinus pressure, sore throat and trouble swallowing  Eyes: Negative for pain, discharge and visual disturbance  Respiratory: Negative for apnea, cough, chest tightness, shortness of breath and wheezing      Cardiovascular: Negative for chest pain, palpitations and leg swelling  The discomfort in his right leg has completely resolved  Gastrointestinal: Negative for abdominal pain, anal bleeding, blood in stool, constipation, diarrhea, nausea and vomiting  Endocrine: Negative for polydipsia and polyphagia  Genitourinary: Negative for decreased urine volume, dysuria, flank pain, frequency, hematuria and urgency  Musculoskeletal: Negative for arthralgias, back pain, gait problem, joint swelling and myalgias  Skin: Negative for rash and wound  Allergic/Immunologic: Negative for environmental allergies and food allergies  Neurological: Negative for dizziness, tremors, seizures, syncope, speech difficulty, light-headedness, numbness and headaches  Hematological: Negative for adenopathy  Does not bruise/bleed easily  Psychiatric/Behavioral: Negative for agitation, confusion, hallucinations, sleep disturbance and suicidal ideas  The patient is not nervous/anxious  Objective:     Physical Exam   Constitutional: He appears well-developed and well-nourished  No distress  HENT:   Head: Normocephalic  Right Ear: External ear normal    Left Ear: External ear normal    Nose: Nose normal    Mouth/Throat: Oropharynx is clear and moist  No oropharyngeal exudate  Eyes: Conjunctivae and EOM are normal  Pupils are equal, round, and reactive to light  Right eye exhibits no discharge  Left eye exhibits no discharge  Neck: Normal range of motion  Neck supple  No thyromegaly present  Cardiovascular: Normal rate, regular rhythm, normal heart sounds and intact distal pulses  Exam reveals no gallop and no friction rub  No murmur heard  At the moment he has no tenderness of the superficial veins of the right leg  The leg is completely returned to normal    Pulmonary/Chest: Effort normal and breath sounds normal  No respiratory distress  He has no wheezes  He has no rales  Abdominal: Soft   Bowel sounds are normal  He exhibits no distension and no mass  There is no tenderness  There is no rebound and no guarding  Musculoskeletal: Normal range of motion  He exhibits no edema, tenderness or deformity  Lymphadenopathy:     He has no cervical adenopathy  Neurological: He is alert  He has normal reflexes  No cranial nerve deficit  Coordination normal    Skin: Skin is warm and dry  No rash noted  No erythema  Psychiatric: He has a normal mood and affect  His behavior is normal  Judgment and thought content normal    Nursing note and vitals reviewed

## 2018-04-11 ENCOUNTER — TELEPHONE (OUTPATIENT)
Dept: INTERNAL MEDICINE CLINIC | Facility: CLINIC | Age: 63
End: 2018-04-11

## 2018-04-11 NOTE — TELEPHONE ENCOUNTER
----- Message from Chacha Carver DO sent at 4/10/2018  5:04 PM EDT -----  Call patient    Carotid ultrasound was good

## 2018-04-23 ENCOUNTER — TELEPHONE (OUTPATIENT)
Dept: INTERNAL MEDICINE CLINIC | Facility: CLINIC | Age: 63
End: 2018-04-23

## 2018-04-23 LAB — COMMENT (HISTORICAL): NORMAL

## 2018-04-25 LAB
Lab: 90 % (ref 63–140)
PROTEIN S ANTIGEN FREE (HISTORICAL): 113 % (ref 57–157)
PROTEIN S ANTIGEN TOTAL (HISTORICAL): 78 % (ref 60–150)

## 2018-04-26 LAB — PROTEIN C ANTIGEN (HISTORICAL): 112 % (ref 60–150)

## 2018-05-01 ENCOUNTER — OFFICE VISIT (OUTPATIENT)
Dept: VASCULAR SURGERY | Facility: CLINIC | Age: 63
End: 2018-05-01
Payer: COMMERCIAL

## 2018-05-01 VITALS
RESPIRATION RATE: 18 BRPM | DIASTOLIC BLOOD PRESSURE: 76 MMHG | HEART RATE: 78 BPM | SYSTOLIC BLOOD PRESSURE: 122 MMHG | WEIGHT: 203 LBS | HEIGHT: 68 IN | TEMPERATURE: 97.7 F | BODY MASS INDEX: 30.77 KG/M2

## 2018-05-01 DIAGNOSIS — I80.9 RECURRENT PHLEBITIS OF SUPERFICIAL VEIN: ICD-10-CM

## 2018-05-01 LAB — FACTOR V LEIDEN (HISTORICAL): NORMAL

## 2018-05-01 PROCEDURE — 99243 OFF/OP CNSLTJ NEW/EST LOW 30: CPT | Performed by: SURGERY

## 2018-05-01 NOTE — PROGRESS NOTES
Assessment/Plan:    Recurrent phlebitis of superficial vein    Recurrent thrombophlebitis of the right lower extremity  I reviewed the duplex which shows involvement of the greater saphenous vein  He started taking Xarelto, he had left over Xarelto after his cardiac ablation done a few years ago  At this point his symptoms seemed to have resolved except for some mild areas of soreness  He also had hypercoagulable workup done at Dr Demarco Parisi is office  Some of the results are still pending but there is nothing positive that came back so far  His Factor 5 Leiden, protein C and S levels are normal     If the hypercoagulable workup is negative there is no need for any further anticoagulation  Daily aspirin is recommended  Avoid long trips and prolonged immobilization  Use compression stockings and leg elevation  If he continues to have recurrent episodes we can even consider ablation of the greater saphenous vein and stab phlebectomy of the large varicosities  Diagnoses and all orders for this visit:    Recurrent phlebitis of superficial vein  -     Ambulatory referral to Vascular Surgery          Subjective:      Patient ID: Mike Carrillo is a 61 y o  male  Patient has recurrent thrombophlebitis of his right lower extremity where he has large varicose veins  Patient had a prior cardiac ablation procedure done few years ago and was not Xarelto after that  He has some left over Xarelto was started taking it each time he would have an episode of thrombophlebitis and then it would improve  This is his 3rd or 4th episode in the past 1 year  This episode began about 3 4 weeks ago and is now resolving except for some areas of soreness in the inner part of the leg around the knee  Denies any fevers or chills          The following portions of the patient's history were reviewed and updated as appropriate: allergies, current medications, past family history, past medical history, past social history, past surgical history and problem list     Review of Systems   Constitutional: Negative  HENT: Negative  Eyes: Negative  Respiratory: Negative  Cardiovascular: Positive for leg swelling  Gastrointestinal: Negative  Endocrine: Negative  Genitourinary: Negative  Musculoskeletal: Negative  Skin: Positive for color change  Allergic/Immunologic: Negative  Neurological: Negative  Hematological: Negative  Psychiatric/Behavioral: Negative  Objective:      /76 (BP Location: Left arm, Patient Position: Sitting, Cuff Size: Standard)   Pulse 78   Temp 97 7 °F (36 5 °C)   Resp 18   Ht 5' 8" (1 727 m)   Wt 92 1 kg (203 lb)   BMI 30 87 kg/m²          Physical Exam   Constitutional: He is oriented to person, place, and time  He appears well-developed and well-nourished  No distress  HENT:   Head: Normocephalic and atraumatic  Neck: Normal range of motion  Neck supple  Cardiovascular: Normal rate and regular rhythm  Pulmonary/Chest: Effort normal and breath sounds normal    Abdominal: Soft  Musculoskeletal: Normal range of motion  Mild tenderness around the medial right lower extremity in the  knee region   Neurological: He is alert and oriented to person, place, and time  Skin: Skin is warm and dry  He is not diaphoretic  No erythema  Psychiatric: He has a normal mood and affect  His behavior is normal    Nursing note and vitals reviewed

## 2018-05-01 NOTE — LETTER
May 2, 2018     Corinne Khan, [address] [address]    Patient: Sissy Gil   YOB: 1955   Date of Visit: 5/1/2018       Dear Dr Bakari Frederick: Thank you for referring Sissy Gil to me for evaluation  Below are my notes for this consultation  If you have questions, please do not hesitate to call me  I look forward to following your patient along with you  Sincerely,        Dodie Shelton MD        CC: No Recipients  Dodie Shelton MD  5/2/2018  5:28 PM  Sign at close encounter  Assessment/Plan:    Recurrent phlebitis of superficial vein    Recurrent thrombophlebitis of the right lower extremity  I reviewed the duplex which shows involvement of the greater saphenous vein  He started taking Xarelto, he had left over Xarelto after his cardiac ablation done a few years ago  At this point his symptoms seemed to have resolved except for some mild areas of soreness  He also had hypercoagulable workup done at Dr Bakari Frederick is office  Some of the results are still pending but there is nothing positive that came back so far  His Factor 5 Leiden, protein C and S levels are normal     If the hypercoagulable workup is negative there is no need for any further anticoagulation  Daily aspirin is recommended  Avoid long trips and prolonged immobilization  Use compression stockings and leg elevation  If he continues to have recurrent episodes we can even consider ablation of the greater saphenous vein and stab phlebectomy of the large varicosities  Diagnoses and all orders for this visit:    Recurrent phlebitis of superficial vein  -     Ambulatory referral to Vascular Surgery          Subjective:      Patient ID: Sissy Gil is a 61 y o  male  Patient has recurrent thrombophlebitis of his right lower extremity where he has large varicose veins    Patient had a prior cardiac ablation procedure done few years ago and was not Xarelto after that  He has some left over Xarelto was started taking it each time he would have an episode of thrombophlebitis and then it would improve  This is his 3rd or 4th episode in the past 1 year  This episode began about 3 4 weeks ago and is now resolving except for some areas of soreness in the inner part of the leg around the knee  Denies any fevers or chills  The following portions of the patient's history were reviewed and updated as appropriate: allergies, current medications, past family history, past medical history, past social history, past surgical history and problem list     Review of Systems   Constitutional: Negative  HENT: Negative  Eyes: Negative  Respiratory: Negative  Cardiovascular: Positive for leg swelling  Gastrointestinal: Negative  Endocrine: Negative  Genitourinary: Negative  Musculoskeletal: Negative  Skin: Positive for color change  Allergic/Immunologic: Negative  Neurological: Negative  Hematological: Negative  Psychiatric/Behavioral: Negative  Objective:      /76 (BP Location: Left arm, Patient Position: Sitting, Cuff Size: Standard)   Pulse 78   Temp 97 7 °F (36 5 °C)   Resp 18   Ht 5' 8" (1 727 m)   Wt 92 1 kg (203 lb)   BMI 30 87 kg/m²           Physical Exam   Constitutional: He is oriented to person, place, and time  He appears well-developed and well-nourished  No distress  HENT:   Head: Normocephalic and atraumatic  Neck: Normal range of motion  Neck supple  Cardiovascular: Normal rate and regular rhythm  Pulmonary/Chest: Effort normal and breath sounds normal    Abdominal: Soft  Musculoskeletal: Normal range of motion  Mild tenderness around the medial right lower extremity in the  knee region   Neurological: He is alert and oriented to person, place, and time  Skin: Skin is warm and dry  He is not diaphoretic  No erythema  Psychiatric: He has a normal mood and affect   His behavior is normal    Nursing note and vitals reviewed

## 2018-05-02 NOTE — ASSESSMENT & PLAN NOTE
Recurrent thrombophlebitis of the right lower extremity  I reviewed the duplex which shows involvement of the greater saphenous vein  He started taking Xarelto, he had left over Xarelto after his cardiac ablation done a few years ago  At this point his symptoms seemed to have resolved except for some mild areas of soreness  He also had hypercoagulable workup done at Dr Bakari Frederick is office  Some of the results are still pending but there is nothing positive that came back so far  His Factor 5 Leiden, protein C and S levels are normal     If the hypercoagulable workup is negative there is no need for any further anticoagulation  Daily aspirin is recommended  Avoid long trips and prolonged immobilization  Use compression stockings and leg elevation  If he continues to have recurrent episodes we can even consider ablation of the greater saphenous vein and stab phlebectomy of the large varicosities

## 2018-05-18 ENCOUNTER — OFFICE VISIT (OUTPATIENT)
Dept: INTERNAL MEDICINE CLINIC | Facility: CLINIC | Age: 63
End: 2018-05-18
Payer: COMMERCIAL

## 2018-05-18 VITALS
BODY MASS INDEX: 30.77 KG/M2 | WEIGHT: 203 LBS | HEART RATE: 89 BPM | OXYGEN SATURATION: 97 % | HEIGHT: 68 IN | DIASTOLIC BLOOD PRESSURE: 80 MMHG | SYSTOLIC BLOOD PRESSURE: 138 MMHG

## 2018-05-18 DIAGNOSIS — Z12.5 SCREENING FOR PROSTATE CANCER: ICD-10-CM

## 2018-05-18 DIAGNOSIS — E78.2 MIXED HYPERLIPIDEMIA: ICD-10-CM

## 2018-05-18 DIAGNOSIS — I80.9 SUPERFICIAL PHLEBITIS: ICD-10-CM

## 2018-05-18 DIAGNOSIS — E11.9 CONTROLLED TYPE 2 DIABETES MELLITUS WITHOUT COMPLICATION, WITHOUT LONG-TERM CURRENT USE OF INSULIN (HCC): Primary | ICD-10-CM

## 2018-05-18 PROCEDURE — 99214 OFFICE O/P EST MOD 30 MIN: CPT | Performed by: INTERNAL MEDICINE

## 2018-05-18 NOTE — PROGRESS NOTES
Assessment/Plan:  Regarding his leg issues he is going to wear compression stocking  He is going to take the aspirin and call me if there is any exacerbation of his symptoms  I offered him a Hematology consult but he declines  Otherwise he will continue his other medicines for his hypertension hyperlipidemia  Will see him back in 3 months  No results found for this or any previous visit (from the past 1008 hour(s))  1  Controlled type 2 diabetes mellitus without complication, without long-term current use of insulin (HCC)  Basic metabolic panel    CBC and differential    HEMOGLOBIN A1C W/ EAG ESTIMATION    Microalbumin / creatinine urine ratio    UA (URINE) with reflex to Microscopic   2  Mixed hyperlipidemia  Lipid panel   3  Superficial phlebitis     4  Screening for prostate cancer  PSA Total, Diagnostic       Orders Placed This Encounter   Procedures    Basic metabolic panel    CBC and differential    HEMOGLOBIN A1C W/ EAG ESTIMATION    Lipid panel    PSA Total, Diagnostic    Microalbumin / creatinine urine ratio    UA (URINE) with reflex to Microscopic         Subjective:  Superficial phlebitis of the right leg   Patient ID: Marcy Denise is a 61 y o  male  HPI he comes in for follow-up  He saw the vascular surgeon  Because of the negative hypercoagulability workup it was recommended he stay on aspirin  His leg feels well  He has not had a recurrence  He is off of Xarelto  He is on a full-dose aspirin  Generally feels well  No chest pain no shortness of breath  The following portions of the patient's history were reviewed and updated as appropriate:   He has a past medical history of Atrial fibrillation ([address] ); Candidiasis, cutaneous; Colon, diverticulosis; Depression; Diabetes mellitus ([address] ); Diverticulosis; GERD (gastroesophageal reflux disease); Hyperlipidemia; Hypertension; Internal hemorrhoids; Irregular heart beat; Pneumonia;  Shortness of breath; Sleep apnea; Stomach cancer ([facility] [address] ); and Vertigo of central origin  ,   does not have any pertinent problems on file  ,   has a past surgical history that includes Tonsillectomy; EGD AND COLONOSCOPY; Colonoscopy; and pr esophagogastroduodenoscopy transoral diagnostic (N/A, 10/10/2017)  ,  family history includes Cancer in his family; Coronary artery disease in his mother; Diabetes in his father; Other in his father and mother  ,   reports that he quit smoking about 34 years ago  He has never used smokeless tobacco  He reports that he drinks alcohol  He reports that he does not use drugs  ,  has No Known Allergies       Current Outpatient Prescriptions:     ALPRAZolam (XANAX) 0 25 mg tablet, Take 1 tablet (0 25 mg total) by mouth daily at bedtime as needed for anxiety, Disp: 30 tablet, Rfl: 2    aspirin (ECOTRIN) 325 mg EC tablet, Take 325 mg by mouth every 6 (six) hours as needed for mild pain, Disp: , Rfl:     atorvastatin (LIPITOR) 40 mg tablet, Take 1 tablet (40 mg total) by mouth daily, Disp: 30 tablet, Rfl: 5    diltiazem (CARDIZEM) 120 MG tablet, Take 1 tablet (120 mg total) by mouth every 6 (six) hours, Disp: 30 tablet, Rfl: 2    escitalopram (LEXAPRO) 10 mg tablet, Take 1 tablet (10 mg total) by mouth daily, Disp: 30 tablet, Rfl: 5    fenofibrate (TRICOR) 145 mg tablet, Take 1 tablet (145 mg total) by mouth daily, Disp: 30 tablet, Rfl: 5    fexofenadine (ALLEGRA) 180 MG tablet, Take 1 tablet (180 mg total) by mouth daily, Disp: 30 tablet, Rfl: 5    flecainide (TAMBOCOR) 100 mg tablet, Take 100 mg by mouth 2 (two) times a day, Disp: , Rfl:     meclizine (ANTIVERT) 32 MG tablet, Take 32 mg by mouth 3 (three) times a day as needed for dizziness, Disp: , Rfl:     metaxalone (SKELAXIN) 800 mg tablet, Take 800 mg by mouth 3 (three) times a day, Disp: , Rfl:     metFORMIN (GLUCOPHAGE) 500 mg tablet, Take 1 tablet (500 mg total) by mouth 2 (two) times a day with meals, Disp: 60 tablet, Rfl: 5    omeprazole (PriLOSEC) 40 MG capsule, Take 40 mg by mouth daily, Disp: , Rfl:     rivaroxaban (XARELTO) 15 mg tablet, Take 1 tablet (15 mg total) by mouth daily with breakfast, Disp: 30 tablet, Rfl: 5    traMADol (ULTRAM) 50 mg tablet, Take 50 mg by mouth every 6 (six) hours as needed for moderate pain, Disp: , Rfl:     Review of Systems   Constitutional: Negative for activity change, appetite change, chills, diaphoresis, fatigue, fever and unexpected weight change  HENT: Negative for congestion, ear pain, hearing loss, mouth sores, nosebleeds, postnasal drip, sinus pain, sinus pressure, sore throat and trouble swallowing  Eyes: Negative for pain, discharge and visual disturbance  Respiratory: Negative for apnea, cough, chest tightness, shortness of breath and wheezing  Cardiovascular: Negative for chest pain, palpitations and leg swelling  Gastrointestinal: Negative for abdominal pain, anal bleeding, blood in stool, constipation, diarrhea, nausea and vomiting  Endocrine: Negative for polydipsia and polyphagia  Genitourinary: Negative for decreased urine volume, dysuria, flank pain, frequency, hematuria and urgency  Musculoskeletal: Negative for arthralgias, back pain, gait problem, joint swelling and myalgias  Skin: Negative for rash and wound  Allergic/Immunologic: Negative for environmental allergies and food allergies  Neurological: Negative for dizziness, tremors, seizures, syncope, speech difficulty, light-headedness, numbness and headaches  Hematological: Negative for adenopathy  Does not bruise/bleed easily  Psychiatric/Behavioral: Negative for agitation, confusion, hallucinations, sleep disturbance and suicidal ideas  The patient is not nervous/anxious  Objective:  Vitals:    05/18/18 1052   BP: 138/80   Pulse: 89   SpO2: 97%     Body mass index is 30 87 kg/m²  Physical Exam   Constitutional: He appears well-developed and well-nourished  No distress  HENT:   Head: Normocephalic  Right Ear: External ear normal    Left Ear: External ear normal    Nose: Nose normal    Mouth/Throat: Oropharynx is clear and moist  No oropharyngeal exudate  Eyes: Conjunctivae and EOM are normal  Pupils are equal, round, and reactive to light  Right eye exhibits no discharge  Left eye exhibits no discharge  Neck: Normal range of motion  Neck supple  No thyromegaly present  Cardiovascular: Normal rate, regular rhythm, normal heart sounds and intact distal pulses  Exam reveals no gallop and no friction rub  No murmur heard  Pulmonary/Chest: Effort normal and breath sounds normal  No respiratory distress  He has no wheezes  He has no rales  Abdominal: Soft  Bowel sounds are normal  He exhibits no distension and no mass  There is no tenderness  There is no rebound and no guarding  Musculoskeletal: Normal range of motion  He exhibits no edema, tenderness or deformity  Lymphadenopathy:     He has no cervical adenopathy  Neurological: He is alert  He has normal reflexes  No cranial nerve deficit  Coordination normal    Skin: Skin is warm and dry  No rash noted  No erythema  Psychiatric: He has a normal mood and affect  His behavior is normal  Judgment and thought content normal    Nursing note and vitals reviewed

## 2018-06-25 RX ORDER — FLECAINIDE ACETATE 100 MG/1
TABLET ORAL
Qty: 30 TABLET | Refills: 5 | OUTPATIENT
Start: 2018-06-25

## 2018-07-02 ENCOUNTER — OFFICE VISIT (OUTPATIENT)
Dept: INTERNAL MEDICINE CLINIC | Facility: CLINIC | Age: 63
End: 2018-07-02
Payer: COMMERCIAL

## 2018-07-02 ENCOUNTER — TELEPHONE (OUTPATIENT)
Dept: INTERNAL MEDICINE CLINIC | Facility: CLINIC | Age: 63
End: 2018-07-02

## 2018-07-02 VITALS
HEART RATE: 76 BPM | TEMPERATURE: 99 F | OXYGEN SATURATION: 94 % | BODY MASS INDEX: 30.52 KG/M2 | DIASTOLIC BLOOD PRESSURE: 66 MMHG | HEIGHT: 68 IN | WEIGHT: 201.4 LBS | SYSTOLIC BLOOD PRESSURE: 116 MMHG

## 2018-07-02 DIAGNOSIS — H00.011 HORDEOLUM EXTERNUM OF RIGHT UPPER EYELID: Primary | ICD-10-CM

## 2018-07-02 DIAGNOSIS — H10.31 ACUTE CONJUNCTIVITIS OF RIGHT EYE, UNSPECIFIED ACUTE CONJUNCTIVITIS TYPE: Primary | ICD-10-CM

## 2018-07-02 DIAGNOSIS — H10.31 ACUTE CONJUNCTIVITIS OF RIGHT EYE, UNSPECIFIED ACUTE CONJUNCTIVITIS TYPE: ICD-10-CM

## 2018-07-02 PROCEDURE — 99214 OFFICE O/P EST MOD 30 MIN: CPT | Performed by: PHYSICIAN ASSISTANT

## 2018-07-02 PROCEDURE — 3008F BODY MASS INDEX DOCD: CPT | Performed by: PHYSICIAN ASSISTANT

## 2018-07-02 RX ORDER — METAXALONE 800 MG/1
800 TABLET ORAL 3 TIMES DAILY
Qty: 90 TABLET | Refills: 0 | Status: CANCELLED | OUTPATIENT
Start: 2018-07-02

## 2018-07-02 RX ORDER — TOBRAMYCIN AND DEXAMETHASONE 3; 1 MG/ML; MG/ML
1 SUSPENSION/ DROPS OPHTHALMIC
Qty: 10 ML | Refills: 0 | Status: ON HOLD | OUTPATIENT
Start: 2018-07-02 | End: 2018-08-29

## 2018-07-02 NOTE — TELEPHONE ENCOUNTER
I have no control or knowledge about the cost of eye meds  They do tend to be expensive compared to other meds  I'll send over something else but I have no idea if it's more affordable or not

## 2018-07-02 NOTE — TELEPHONE ENCOUNTER
Patient was in this morning and a eye ointment was ordered for patient and the cost is over $90 00  Patient asked if there was a generic or a substitute that could be called in?     Any questions patient can be reached at

## 2018-07-02 NOTE — PROGRESS NOTES
Assessment/Plan:    Right eye stye/conjunctivitis-  Tobramycin ointment for 7 days  Warm compresses  Good hand hygiene is emphasized  No problem-specific Assessment & Plan notes found for this encounter  Diagnoses and all orders for this visit:    Hordeolum externum of right upper eyelid  -     tobramycin (TOBREX) 0 3 % OINT; Administer 0 5 inches to the right eye 3 (three) times a day    Other orders  -     Cancel: metaxalone (SKELAXIN) 800 mg tablet; Take 1 tablet (800 mg total) by mouth 3 (three) times a day          Subjective:      Patient ID: Karen Bone is a 61 y o  male  Pt comes in c/o of puffiness and a stye in his left eye, lower lash line, last week  This completely resolved and he says it seemed to jump over to his right eye  Now he has a stye in the right, upper inner lash line  The cheek and upper lid are puffy  He woke up with sticking of his lashes  A little itchy, no pain  He used OTC Visine and "blink" eye drops  No other URI sx  No f/c/s  The following portions of the patient's history were reviewed and updated as appropriate: allergies, current medications, past family history, past medical history, past social history, past surgical history and problem list     Review of Systems   Constitutional: Negative for chills and fever  HENT: Negative for congestion, ear pain, postnasal drip, rhinorrhea, sinus pain, sinus pressure, sneezing and sore throat  Eyes: Positive for discharge, itching and visual disturbance  Negative for pain and redness  Respiratory: Negative for cough  Cardiovascular: Negative for chest pain and palpitations  Gastrointestinal: Negative for abdominal pain, diarrhea and nausea           Objective:      /66 (BP Location: Left arm, Patient Position: Sitting, Cuff Size: Standard)   Pulse 76   Temp 99 °F (37 2 °C) (Tympanic) Comment: w/ aspirin  Ht 5' 8" (1 727 m) Comment: w/ shoe denied off  Wt 91 4 kg (201 lb 6 4 oz) Comment: w/ shoe denied off  SpO2 94%   BMI 30 62 kg/m²          Physical Exam   Constitutional: He appears well-developed and well-nourished  HENT:   Head: Normocephalic and atraumatic  Right Ear: External ear normal    Left Ear: External ear normal    Mouth/Throat: Oropharynx is clear and moist  No oropharyngeal exudate  Eyes: Right eye exhibits hordeolum  Left eye exhibits no hordeolum  Right conjunctiva is not injected  Right conjunctiva has no hemorrhage  Left conjunctiva is not injected  Left conjunctiva has no hemorrhage  Right eye has some puffiness of the top lid and beneath the right eye over the cheek  No active d/c right now  No erythema   Cardiovascular: Normal rate, regular rhythm and normal heart sounds  Pulmonary/Chest: Effort normal and breath sounds normal    Abdominal: Soft  Bowel sounds are normal  There is no tenderness

## 2018-08-02 ENCOUNTER — APPOINTMENT (OUTPATIENT)
Dept: LAB | Facility: CLINIC | Age: 63
End: 2018-08-02
Payer: COMMERCIAL

## 2018-08-02 DIAGNOSIS — E11.9 CONTROLLED TYPE 2 DIABETES MELLITUS WITHOUT COMPLICATION, WITHOUT LONG-TERM CURRENT USE OF INSULIN (HCC): ICD-10-CM

## 2018-08-02 DIAGNOSIS — E78.2 MIXED HYPERLIPIDEMIA: ICD-10-CM

## 2018-08-02 DIAGNOSIS — Z12.5 SCREENING FOR PROSTATE CANCER: ICD-10-CM

## 2018-08-02 LAB
ANION GAP SERPL CALCULATED.3IONS-SCNC: 6 MMOL/L (ref 4–13)
BASOPHILS # BLD AUTO: 0.07 THOUSANDS/ΜL (ref 0–0.1)
BASOPHILS NFR BLD AUTO: 1 % (ref 0–1)
BUN SERPL-MCNC: 19 MG/DL (ref 5–25)
CALCIUM SERPL-MCNC: 9.7 MG/DL (ref 8.3–10.1)
CHLORIDE SERPL-SCNC: 105 MMOL/L (ref 100–108)
CHOLEST SERPL-MCNC: 162 MG/DL (ref 50–200)
CO2 SERPL-SCNC: 29 MMOL/L (ref 21–32)
CREAT SERPL-MCNC: 1.27 MG/DL (ref 0.6–1.3)
EOSINOPHIL # BLD AUTO: 0.13 THOUSAND/ΜL (ref 0–0.61)
EOSINOPHIL NFR BLD AUTO: 2 % (ref 0–6)
ERYTHROCYTE [DISTWIDTH] IN BLOOD BY AUTOMATED COUNT: 14.6 % (ref 11.6–15.1)
EST. AVERAGE GLUCOSE BLD GHB EST-MCNC: 128 MG/DL
GFR SERPL CREATININE-BSD FRML MDRD: 60 ML/MIN/1.73SQ M
GLUCOSE P FAST SERPL-MCNC: 96 MG/DL (ref 65–99)
HBA1C MFR BLD: 6.1 % (ref 4.2–6.3)
HCT VFR BLD AUTO: 39.9 % (ref 36.5–49.3)
HDLC SERPL-MCNC: 45 MG/DL (ref 40–60)
HGB BLD-MCNC: 12.7 G/DL (ref 12–17)
IMM GRANULOCYTES # BLD AUTO: 0.02 THOUSAND/UL (ref 0–0.2)
IMM GRANULOCYTES NFR BLD AUTO: 0 % (ref 0–2)
LDLC SERPL CALC-MCNC: 98 MG/DL (ref 0–100)
LYMPHOCYTES # BLD AUTO: 2.27 THOUSANDS/ΜL (ref 0.6–4.47)
LYMPHOCYTES NFR BLD AUTO: 33 % (ref 14–44)
MCH RBC QN AUTO: 27.6 PG (ref 26.8–34.3)
MCHC RBC AUTO-ENTMCNC: 31.8 G/DL (ref 31.4–37.4)
MCV RBC AUTO: 87 FL (ref 82–98)
MONOCYTES # BLD AUTO: 0.53 THOUSAND/ΜL (ref 0.17–1.22)
MONOCYTES NFR BLD AUTO: 8 % (ref 4–12)
NEUTROPHILS # BLD AUTO: 3.83 THOUSANDS/ΜL (ref 1.85–7.62)
NEUTS SEG NFR BLD AUTO: 56 % (ref 43–75)
NONHDLC SERPL-MCNC: 117 MG/DL
NRBC BLD AUTO-RTO: 0 /100 WBCS
PLATELET # BLD AUTO: 302 THOUSANDS/UL (ref 149–390)
PMV BLD AUTO: 10.7 FL (ref 8.9–12.7)
POTASSIUM SERPL-SCNC: 4.1 MMOL/L (ref 3.5–5.3)
PSA SERPL-MCNC: 0.4 NG/ML (ref 0–4)
RBC # BLD AUTO: 4.6 MILLION/UL (ref 3.88–5.62)
SODIUM SERPL-SCNC: 140 MMOL/L (ref 136–145)
TRIGL SERPL-MCNC: 95 MG/DL
WBC # BLD AUTO: 6.85 THOUSAND/UL (ref 4.31–10.16)

## 2018-08-02 PROCEDURE — 83036 HEMOGLOBIN GLYCOSYLATED A1C: CPT

## 2018-08-02 PROCEDURE — 80061 LIPID PANEL: CPT

## 2018-08-02 PROCEDURE — 84153 ASSAY OF PSA TOTAL: CPT

## 2018-08-02 PROCEDURE — 36415 COLL VENOUS BLD VENIPUNCTURE: CPT

## 2018-08-02 PROCEDURE — 85025 COMPLETE CBC W/AUTO DIFF WBC: CPT

## 2018-08-02 PROCEDURE — 80048 BASIC METABOLIC PNL TOTAL CA: CPT

## 2018-08-20 ENCOUNTER — OFFICE VISIT (OUTPATIENT)
Dept: INTERNAL MEDICINE CLINIC | Facility: CLINIC | Age: 63
End: 2018-08-20
Payer: COMMERCIAL

## 2018-08-20 ENCOUNTER — TELEPHONE (OUTPATIENT)
Dept: GASTROENTEROLOGY | Facility: CLINIC | Age: 63
End: 2018-08-20

## 2018-08-20 ENCOUNTER — TRANSCRIBE ORDERS (OUTPATIENT)
Dept: LAB | Facility: CLINIC | Age: 63
End: 2018-08-20

## 2018-08-20 ENCOUNTER — APPOINTMENT (OUTPATIENT)
Dept: LAB | Facility: CLINIC | Age: 63
End: 2018-08-20
Payer: COMMERCIAL

## 2018-08-20 VITALS
HEIGHT: 68 IN | DIASTOLIC BLOOD PRESSURE: 76 MMHG | WEIGHT: 205 LBS | SYSTOLIC BLOOD PRESSURE: 142 MMHG | BODY MASS INDEX: 31.07 KG/M2 | HEART RATE: 74 BPM | OXYGEN SATURATION: 97 %

## 2018-08-20 DIAGNOSIS — E78.00 HIGH CHOLESTEROL: ICD-10-CM

## 2018-08-20 DIAGNOSIS — M62.838 MUSCLE SPASM: ICD-10-CM

## 2018-08-20 DIAGNOSIS — E11.9 CONTROLLED TYPE 2 DIABETES MELLITUS WITHOUT COMPLICATION, WITHOUT LONG-TERM CURRENT USE OF INSULIN (HCC): ICD-10-CM

## 2018-08-20 DIAGNOSIS — F32.A ANXIETY AND DEPRESSION: ICD-10-CM

## 2018-08-20 DIAGNOSIS — I48.91 ATRIAL FIBRILLATION, UNSPECIFIED TYPE (HCC): ICD-10-CM

## 2018-08-20 DIAGNOSIS — E13.9 DIABETES MELLITUS OF OTHER TYPE WITHOUT COMPLICATION, UNSPECIFIED WHETHER LONG TERM INSULIN USE (HCC): Primary | ICD-10-CM

## 2018-08-20 DIAGNOSIS — F41.9 ANXIETY AND DEPRESSION: ICD-10-CM

## 2018-08-20 DIAGNOSIS — I10 BENIGN ESSENTIAL HYPERTENSION: Primary | ICD-10-CM

## 2018-08-20 PROBLEM — I80.9: Status: RESOLVED | Noted: 2017-03-17 | Resolved: 2018-08-20

## 2018-08-20 PROBLEM — Z12.11 SPECIAL SCREENING FOR MALIGNANT NEOPLASMS, COLON: Status: ACTIVE | Noted: 2018-08-20

## 2018-08-20 LAB
BILIRUB UR QL STRIP: NEGATIVE
CLARITY UR: CLEAR
COLOR UR: NORMAL
GLUCOSE UR STRIP-MCNC: NEGATIVE MG/DL
HGB UR QL STRIP.AUTO: NEGATIVE
KETONES UR STRIP-MCNC: NEGATIVE MG/DL
LEUKOCYTE ESTERASE UR QL STRIP: NEGATIVE
NITRITE UR QL STRIP: NEGATIVE
PH UR STRIP.AUTO: 7 [PH] (ref 4.5–8)
PROT UR STRIP-MCNC: NEGATIVE MG/DL
SP GR UR STRIP.AUTO: 1.03 (ref 1–1.03)
UROBILINOGEN UR QL STRIP.AUTO: 1 E.U./DL

## 2018-08-20 PROCEDURE — 81003 URINALYSIS AUTO W/O SCOPE: CPT | Performed by: INTERNAL MEDICINE

## 2018-08-20 PROCEDURE — 99214 OFFICE O/P EST MOD 30 MIN: CPT | Performed by: INTERNAL MEDICINE

## 2018-08-20 RX ORDER — DILTIAZEM HYDROCHLORIDE 120 MG/1
120 TABLET, FILM COATED ORAL DAILY
Qty: 90 TABLET | Refills: 3 | Status: SHIPPED | OUTPATIENT
Start: 2018-08-20 | End: 2019-02-25 | Stop reason: SDUPTHER

## 2018-08-20 RX ORDER — METAXALONE 800 MG/1
800 TABLET ORAL 3 TIMES DAILY
Qty: 60 TABLET | Refills: 0 | Status: SHIPPED | OUTPATIENT
Start: 2018-08-20 | End: 2019-11-12 | Stop reason: SDUPTHER

## 2018-08-20 NOTE — PROGRESS NOTES
Assessment/Plan:  Cardiac status seems stable with no chest pain and irregular rhythm  Diabetes is under control with an A1c of 6 1  LDL cholesterol is 98  Up-to-date on eye checkups needs to get a colonoscopy done  Will see him back in 4 months repeating his labs at that time  All questions answered  Dietary measures stressed with with this patient      Recent Results (from the past 1008 hour(s))   Basic metabolic panel    Collection Time: 08/02/18  8:59 AM   Result Value Ref Range    Sodium 140 136 - 145 mmol/L    Potassium 4 1 3 5 - 5 3 mmol/L    Chloride 105 100 - 108 mmol/L    CO2 29 21 - 32 mmol/L    Anion Gap 6 4 - 13 mmol/L    BUN 19 5 - 25 mg/dL    Creatinine 1 27 0 60 - 1 30 mg/dL    Glucose, Fasting 96 65 - 99 mg/dL    Calcium 9 7 8 3 - 10 1 mg/dL    eGFR 60 ml/min/1 73sq m   CBC and differential    Collection Time: 08/02/18  8:59 AM   Result Value Ref Range    WBC 6 85 4 31 - 10 16 Thousand/uL    RBC 4 60 3 88 - 5 62 Million/uL    Hemoglobin 12 7 12 0 - 17 0 g/dL    Hematocrit 39 9 36 5 - 49 3 %    MCV 87 82 - 98 fL    MCH 27 6 26 8 - 34 3 pg    MCHC 31 8 31 4 - 37 4 g/dL    RDW 14 6 11 6 - 15 1 %    MPV 10 7 8 9 - 12 7 fL    Platelets 972 394 - 563 Thousands/uL    nRBC 0 /100 WBCs    Neutrophils Relative 56 43 - 75 %    Immat GRANS % 0 0 - 2 %    Lymphocytes Relative 33 14 - 44 %    Monocytes Relative 8 4 - 12 %    Eosinophils Relative 2 0 - 6 %    Basophils Relative 1 0 - 1 %    Neutrophils Absolute 3 83 1 85 - 7 62 Thousands/µL    Immature Grans Absolute 0 02 0 00 - 0 20 Thousand/uL    Lymphocytes Absolute 2 27 0 60 - 4 47 Thousands/µL    Monocytes Absolute 0 53 0 17 - 1 22 Thousand/µL    Eosinophils Absolute 0 13 0 00 - 0 61 Thousand/µL    Basophils Absolute 0 07 0 00 - 0 10 Thousands/µL   HEMOGLOBIN A1C W/ EAG ESTIMATION    Collection Time: 08/02/18  8:59 AM   Result Value Ref Range    Hemoglobin A1C 6 1 4 2 - 6 3 %     mg/dl   Lipid panel    Collection Time: 08/02/18  8:59 AM Result Value Ref Range    Cholesterol 162 50 - 200 mg/dL    Triglycerides 95 <=150 mg/dL    HDL, Direct 45 40 - 60 mg/dL    LDL Calculated 98 0 - 100 mg/dL    Non-HDL-Chol (CHOL-HDL) 117 mg/dl   PSA Total, Diagnostic    Collection Time: 08/02/18  8:59 AM   Result Value Ref Range    PSA 0 4 0 0 - 4 0 ng/mL       1  Benign essential hypertension     2  Controlled type 2 diabetes mellitus without complication, without long-term current use of insulin (HCC)  metFORMIN (GLUCOPHAGE) 500 mg tablet    Basic metabolic panel    HEMOGLOBIN A1C W/ EAG ESTIMATION   3  High cholesterol  diltiazem (CARDIZEM) 120 MG tablet    Lipid panel   4  Atrial fibrillation, unspecified type (HCC)  diltiazem (CARDIZEM) 120 MG tablet   5  Anxiety and depression     6  Muscle spasm  metaxalone (SKELAXIN) 800 mg tablet       Orders Placed This Encounter   Procedures    Basic metabolic panel    HEMOGLOBIN A1C W/ EAG ESTIMATION    Lipid panel         Subjective:  Generally feels fairly well but does have stress  Taking care of his mother law who has severe dementia  Labs reasonably stable with the exception of creatinine slightly elevated at 1 27  A1c is quite good at 6 1 an LDL cholesterol is 98  Patient ID: Bunnie Leventhal is a 61 y o  male  HPI    The following portions of the patient's history were reviewed and updated as appropriate:   He has a past medical history of Atrial fibrillation ([address] ); Candidiasis, cutaneous; Colon, diverticulosis; Depression; Diabetes mellitus ([address] ); Diverticulosis; GERD (gastroesophageal reflux disease); Hyperlipidemia; Hypertension; Internal hemorrhoids; Irregular heart beat; Pneumonia; Shortness of breath; Sleep apnea; Stomach cancer ([address] ); and Vertigo of central origin  ,   does not have any pertinent problems on file  ,   has a past surgical history that includes Tonsillectomy; EGD AND COLONOSCOPY; Colonoscopy; and pr esophagogastroduodenoscopy transoral diagnostic (N/A, 10/10/2017)  ,  family history includes Cancer in his family; Coronary artery disease in his mother; Diabetes in his father; Other in his father and mother  ,   reports that he quit smoking about 34 years ago  His smoking use included Cigarettes  He has never used smokeless tobacco  He reports that he drinks alcohol  He reports that he does not use drugs  ,  has No Known Allergies       Current Outpatient Prescriptions:     ALPRAZolam (XANAX) 0 25 mg tablet, Take 1 tablet (0 25 mg total) by mouth daily at bedtime as needed for anxiety, Disp: 30 tablet, Rfl: 2    aspirin (ECOTRIN) 325 mg EC tablet, Take 325 mg by mouth daily  , Disp: , Rfl:     atorvastatin (LIPITOR) 40 mg tablet, Take 1 tablet (40 mg total) by mouth daily, Disp: 30 tablet, Rfl: 5    diltiazem (CARDIZEM) 120 MG tablet, Take 1 tablet (120 mg total) by mouth daily, Disp: 90 tablet, Rfl: 3    escitalopram (LEXAPRO) 10 mg tablet, Take 1 tablet (10 mg total) by mouth daily, Disp: 30 tablet, Rfl: 5    fenofibrate (TRICOR) 145 mg tablet, Take 1 tablet (145 mg total) by mouth daily, Disp: 30 tablet, Rfl: 5    fexofenadine (ALLEGRA) 180 MG tablet, Take 1 tablet (180 mg total) by mouth daily (Patient taking differently: Take 180 mg by mouth as needed  ), Disp: 30 tablet, Rfl: 5    flecainide (TAMBOCOR) 100 mg tablet, Take 100 mg by mouth 2 (two) times a day, Disp: , Rfl:     meclizine (ANTIVERT) 32 MG tablet, Take 32 mg by mouth 3 (three) times a day as needed for dizziness, Disp: , Rfl:     metaxalone (SKELAXIN) 800 mg tablet, Take 1 tablet (800 mg total) by mouth 3 (three) times a day, Disp: 60 tablet, Rfl: 0    metFORMIN (GLUCOPHAGE) 500 mg tablet, Take 1 tablet (500 mg total) by mouth 2 (two) times a day with meals, Disp: 180 tablet, Rfl: 3    omeprazole (PriLOSEC) 40 MG capsule, Take 40 mg by mouth daily, Disp: , Rfl:     rivaroxaban (XARELTO) 15 mg tablet, Take 1 tablet (15 mg total) by mouth daily with breakfast, Disp: 30 tablet, Rfl: 5   tobramycin-dexamethasone (TOBRADEX) ophthalmic suspension, Administer 1 drop to the right eye every 4 (four) hours while awake For 7 days, Disp: 10 mL, Rfl: 0    traMADol (ULTRAM) 50 mg tablet, Take 50 mg by mouth every 6 (six) hours as needed for moderate pain, Disp: , Rfl:     Review of Systems   Constitutional: Negative for activity change, appetite change, chills, diaphoresis, fatigue, fever and unexpected weight change  He feels a lot of stress taking care of his mother-in-law  HENT: Negative for congestion, ear pain, hearing loss, mouth sores, nosebleeds, postnasal drip, sinus pain, sinus pressure, sore throat and trouble swallowing  Eyes: Negative for pain, discharge and visual disturbance  Respiratory: Negative for apnea, cough, chest tightness, shortness of breath and wheezing  Cardiovascular: Negative for chest pain, palpitations and leg swelling  Has paroxysmal atrial fibrillation  No chest pains  Gastrointestinal: Negative for abdominal pain, anal bleeding, blood in stool, constipation, diarrhea, nausea and vomiting  Endocrine: Negative for polydipsia and polyphagia  Genitourinary: Negative for decreased urine volume, dysuria, flank pain, frequency, hematuria and urgency  Musculoskeletal: Negative for arthralgias, back pain, gait problem, joint swelling and myalgias  Skin: Negative for rash and wound  Allergic/Immunologic: Negative for environmental allergies and food allergies  Neurological: Negative for dizziness, tremors, seizures, syncope, speech difficulty, light-headedness, numbness and headaches  Hematological: Negative for adenopathy  Does not bruise/bleed easily  Psychiatric/Behavioral: Negative for agitation, confusion, hallucinations, sleep disturbance and suicidal ideas  The patient is not nervous/anxious            Objective:  /76 (BP Location: Left arm, Patient Position: Sitting)   Pulse 74   Ht 5' 8" (1 727 m)   Wt 93 kg (205 lb)   SpO2 97%   BMI 31 17 kg/m²      Physical Exam   Constitutional: He appears well-developed  No distress  Moderately overweight  Blood pressure is 128/80  Rhythm is regular  Rate is 72  Respirations are 20   HENT:   Head: Normocephalic  Right Ear: External ear normal    Left Ear: External ear normal    Nose: Nose normal    Mouth/Throat: Oropharynx is clear and moist  No oropharyngeal exudate  Eyes: Conjunctivae and EOM are normal  Pupils are equal, round, and reactive to light  Right eye exhibits no discharge  Left eye exhibits no discharge  Neck: Normal range of motion  Neck supple  No thyromegaly present  Cardiovascular: Normal rate, regular rhythm, normal heart sounds and intact distal pulses  Exam reveals no gallop and no friction rub  No murmur heard  Rhythm is regular the present time  Pulmonary/Chest: Effort normal and breath sounds normal  No respiratory distress  He has no wheezes  He has no rales  Abdominal: Soft  Bowel sounds are normal  He exhibits no distension and no mass  There is no tenderness  There is no rebound and no guarding  Musculoskeletal: Normal range of motion  He exhibits no edema, tenderness or deformity  Lymphadenopathy:     He has no cervical adenopathy  Neurological: He is alert  He has normal reflexes  No cranial nerve deficit  Coordination normal    Skin: Skin is warm and dry  No rash noted  No erythema  Psychiatric: He has a normal mood and affect  His behavior is normal  Judgment and thought content normal    Nursing note and vitals reviewed

## 2018-08-28 ENCOUNTER — ANESTHESIA EVENT (OUTPATIENT)
Dept: PERIOP | Facility: HOSPITAL | Age: 63
End: 2018-08-28
Payer: COMMERCIAL

## 2018-08-29 ENCOUNTER — ANESTHESIA (OUTPATIENT)
Dept: PERIOP | Facility: HOSPITAL | Age: 63
End: 2018-08-29
Payer: COMMERCIAL

## 2018-08-29 ENCOUNTER — HOSPITAL ENCOUNTER (OUTPATIENT)
Facility: HOSPITAL | Age: 63
Setting detail: OUTPATIENT SURGERY
Discharge: HOME | End: 2018-08-29
Attending: INTERNAL MEDICINE | Admitting: INTERNAL MEDICINE
Payer: COMMERCIAL

## 2018-08-29 VITALS
SYSTOLIC BLOOD PRESSURE: 108 MMHG | HEART RATE: 56 BPM | HEIGHT: 68 IN | OXYGEN SATURATION: 96 % | RESPIRATION RATE: 20 BRPM | DIASTOLIC BLOOD PRESSURE: 65 MMHG | BODY MASS INDEX: 30.35 KG/M2 | TEMPERATURE: 97.8 F | WEIGHT: 200.25 LBS

## 2018-08-29 DIAGNOSIS — Z12.11 SPECIAL SCREENING FOR MALIGNANT NEOPLASMS, COLON: ICD-10-CM

## 2018-08-29 LAB — GLUCOSE SERPL-MCNC: 93 MG/DL (ref 65–140)

## 2018-08-29 PROCEDURE — 88305 TISSUE EXAM BY PATHOLOGIST: CPT | Performed by: PATHOLOGY

## 2018-08-29 PROCEDURE — 45384 COLONOSCOPY W/LESION REMOVAL: CPT | Performed by: INTERNAL MEDICINE

## 2018-08-29 PROCEDURE — 82948 REAGENT STRIP/BLOOD GLUCOSE: CPT

## 2018-08-29 PROCEDURE — 45380 COLONOSCOPY AND BIOPSY: CPT | Performed by: INTERNAL MEDICINE

## 2018-08-29 RX ORDER — PROPOFOL 10 MG/ML
INJECTION, EMULSION INTRAVENOUS AS NEEDED
Status: DISCONTINUED | OUTPATIENT
Start: 2018-08-29 | End: 2018-08-29 | Stop reason: SURG

## 2018-08-29 RX ORDER — SODIUM CHLORIDE, SODIUM LACTATE, POTASSIUM CHLORIDE, CALCIUM CHLORIDE 600; 310; 30; 20 MG/100ML; MG/100ML; MG/100ML; MG/100ML
125 INJECTION, SOLUTION INTRAVENOUS CONTINUOUS
Status: DISCONTINUED | OUTPATIENT
Start: 2018-08-29 | End: 2018-08-29 | Stop reason: HOSPADM

## 2018-08-29 RX ORDER — LIDOCAINE HYDROCHLORIDE 10 MG/ML
INJECTION, SOLUTION INFILTRATION; PERINEURAL AS NEEDED
Status: DISCONTINUED | OUTPATIENT
Start: 2018-08-29 | End: 2018-08-29 | Stop reason: SURG

## 2018-08-29 RX ADMIN — PROPOFOL 50 MG: 10 INJECTION, EMULSION INTRAVENOUS at 07:31

## 2018-08-29 RX ADMIN — LIDOCAINE HYDROCHLORIDE 50 MG: 10 INJECTION, SOLUTION INFILTRATION; PERINEURAL at 07:27

## 2018-08-29 RX ADMIN — PROPOFOL 50 MG: 10 INJECTION, EMULSION INTRAVENOUS at 07:35

## 2018-08-29 RX ADMIN — PROPOFOL 100 MG: 10 INJECTION, EMULSION INTRAVENOUS at 07:27

## 2018-08-29 RX ADMIN — SODIUM CHLORIDE, SODIUM LACTATE, POTASSIUM CHLORIDE, AND CALCIUM CHLORIDE 125 ML/HR: .6; .31; .03; .02 INJECTION, SOLUTION INTRAVENOUS at 06:57

## 2018-08-29 NOTE — OP NOTE
Postoperative Note  PATIENT NAME: Karen Bone  : 1955  MRN: [id number]  [facility] ROOM 01    Surgery Date: 2018    POST-OP DIAGNOSIS: See the impression below    SEDATION: Monitored anesthesia care, check anesthesia records    PHYSICAL EXAM:  Vitals:    18 0644   BP: 145/71   Pulse: 74   Resp: 20   Temp: 98 4 °F (36 9 °C)   SpO2: 95%     Body mass index is 30 45 kg/m²  General: NAD  Heart: S1 & S2 normal, RRR  Lungs: CTA, No rales or rhonchi  Abdomen: Soft, nontender, nondistended, good bowel sounds    CONSENT:  Informed consent was obtained for the procedure, including sedation after explaining the risks and benefits of the procedure  Risks including but not limited to bleeding, perforation, infection, aspiration were discussed in detail  Also explained about less than 100%$ sensitivity with the exam and other alternatives  DESCRIPTION:   Procedure:  Fiberoptic colonoscopy with hot biopsy polyp excision    Indications:  27-year-old male with a family history of colon carcinoma in the sister in her 62s    ASA 2    Last colonoscopy 5 years ago    Premedicated with mac anesthesia    Patient is identified by me  He is examined prior to the procedure found to be in stable condition  He is attached to the cardiac monitor and pulse oximeter and placed in left lateral position  After adequate intravenous sedation the Olympus video colonoscope was inserted and passed easily to the cecum  The ileocecal valve and the appendiceal orifice are easily identified  The scope was slowly withdrawn with good circumferential visualization of the mucosa  The preparation is adequate  In the ascending colon there are several small diverticuli  None have impacted fecaliths and none her inflamed or bleeding  Two diminutive polyps seen in the ascending colon, both removed completely with hot biopsy technique  Polyp retrieved and sent to pathology    A diminutive polyp was seen in the rectosigmoid colon   This was also removed with hot biopsy technique with excellent lamberto  The polyp was retrieved and sent to pathology  Retroversion is normal  No other inflammatory or neoplastic or vascular lesions are noted  The patient tolerated the procedure well and was stable throughout  My impression:  1  Diminutive ascending colon polyps status post hot biopsy polypectomy  2  Diminutive rectosigmoid polyp status post hot biopsy polypectomy  3  Minor ascending colon diverticulosis    RECOMMENDATIONS:  Follow up biopsy results in 1 week  Repeat colonoscopy in 5 years    COMPLICATIONS:  None; patient tolerated the procedure well  DISPOSITION: PACU  CONDITION: Stable    Raul Dukes MD  8/29/2018,7:39 AM        Portions of the record may have been created with voice recognition software   Occasional wrong word or "sound a like" substitutions may have occurred due to the inherent limitations of voice recognition software   Read the chart carefully and recognize, using context, where substitutions have occurred

## 2018-08-29 NOTE — ANESTHESIA POSTPROCEDURE EVALUATION
Post-Op Assessment Note      CV Status:  Stable    Mental Status:  Somnolent    Hydration Status:  Euvolemic    PONV Controlled:  Controlled    Airway Patency:  Patent    Post Op Vitals Reviewed: Yes          Staff: CRNA           /58 (08/29/18 0741)    Temp 97 8 °F (36 6 °C) (08/29/18 0741)    Pulse 57 (08/29/18 0741)   Resp 18 (08/29/18 0741)    SpO2 92 % (08/29/18 0741)

## 2018-08-29 NOTE — DISCHARGE INSTRUCTIONS
Colonoscopy   WHAT YOU NEED TO KNOW:   A colonoscopy is a procedure to examine the inside of your colon (intestine) with a scope  Polyps or tissue growths may have been removed during your colonoscopy  It is normal to feel bloated and to have some abdominal discomfort  You should be passing gas  If you have hemorrhoids or you had polyps removed, you may have a small amount of bleeding  DISCHARGE INSTRUCTIONS:   Seek care immediately if:   · You have a large amount of bright red blood in your bowel movements  · Your abdomen is hard and firm and you have severe pain  · You have sudden trouble breathing  Contact your healthcare provider if:   · You develop a rash or hives  · You have a fever within 24 hours of your procedure  · You have not had a bowel movement for 3 days after your procedure  · You have questions or concerns about your condition or care  Activity:   · Do not lift, strain, or run  for 3 days after your procedure  · Rest after your procedure  You have been given medicine to relax you  Do not  drive or make important decisions until the day after your procedure  Return to your normal activity as directed  · Relieve gas and discomfort from bloating  by lying on your right side with a heating pad on your abdomen  You may need to take short walks to help the gas move out  Eat small meals until bloating is relieved  If you had polyps removed: For 7 days after your procedure:  · Do not  take aspirin  · Do not  go on long car rides  Help prevent constipation:   · Eat a variety of healthy foods  Healthy foods include fruit, vegetables, whole-grain breads, low-fat dairy products, beans, lean meat, and fish  Ask if you need to be on a special diet  Your healthcare provider may recommend that you eat high-fiber foods such as cooked beans  Fiber helps you have regular bowel movements  · Drink liquids as directed    Adults should drink between 9 and 13 eight-ounce cups of liquid every day  Ask what amount is best for you  For most people, good liquids to drink are water, juice, and milk  · Exercise as directed  Talk to your healthcare provider about the best exercise plan for you  Exercise can help prevent constipation, decrease your blood pressure and improve your health  Follow up with your healthcare provider as directed:  Write down your questions so you remember to ask them during your visits  © 2017 [employer] Information is for End User's use only and may not be sold, redistributed or otherwise used for commercial purposes  All illustrations and images included in CareNotes® are the copyrighted property of [employer]  or [employer]  The above information is an  only  It is not intended as medical advice for individual conditions or treatments  Talk to your doctor, nurse or pharmacist before following any medical regimen to see if it is safe and effective for you

## 2018-08-29 NOTE — ANESTHESIA PREPROCEDURE EVALUATION
Review of Systems/Medical History  Patient summary reviewed    No history of anesthetic complications     Cardiovascular  EKG reviewed, Exercise tolerance (METS): >4,  Hyperlipidemia, Hypertension controlled, Dysrhythmias , atrial fibrillation,    Pulmonary  Sleep apnea ,        GI/Hepatic    GERD , Bowel prep            Endo/Other  Diabetes well controlled type 2 Oral agent,   Obesity    GYN       Hematology  Negative hematology ROS      Musculoskeletal  Negative musculoskeletal ROS        Neurology  Negative neurology ROS      Psychology   Depression ,              Physical Exam    Airway    Mallampati score: II  TM Distance: >3 FB  Neck ROM: full     Dental   Comment: No loose  ,     Cardiovascular  Rhythm: regular, Rate: normal,     Pulmonary  Breath sounds clear to auscultation,     Other Findings        Anesthesia Plan  ASA Score- 3     Anesthesia Type- IV sedation with anesthesia with ASA Monitors  Additional Monitors:   Airway Plan:         Plan Factors-  Patient did not smoke on day of surgery  Induction- intravenous  Postoperative Plan-     Informed Consent- Anesthetic plan and risks discussed with patient  I personally reviewed this patient with the CRNA  Discussed and agreed on the Anesthesia Plan with the CRNA  Lucero Russell

## 2018-09-06 ENCOUNTER — TELEPHONE (OUTPATIENT)
Dept: GASTROENTEROLOGY | Facility: CLINIC | Age: 63
End: 2018-09-06

## 2018-09-28 LAB
LEFT EYE DIABETIC RETINOPATHY: NORMAL
RIGHT EYE DIABETIC RETINOPATHY: NORMAL

## 2018-09-28 PROCEDURE — 3072F LOW RISK FOR RETINOPATHY: CPT | Performed by: INTERNAL MEDICINE

## 2018-11-28 ENCOUNTER — APPOINTMENT (OUTPATIENT)
Dept: LAB | Facility: CLINIC | Age: 63
End: 2018-11-28
Payer: COMMERCIAL

## 2018-11-28 DIAGNOSIS — E78.5 HYPERLIPIDEMIA: ICD-10-CM

## 2018-11-28 DIAGNOSIS — E11.9 TYPE 2 DIABETES MELLITUS WITHOUT COMPLICATIONS (HCC): ICD-10-CM

## 2018-11-28 DIAGNOSIS — E78.00 HIGH CHOLESTEROL: ICD-10-CM

## 2018-11-28 DIAGNOSIS — E11.9 CONTROLLED TYPE 2 DIABETES MELLITUS WITHOUT COMPLICATION, WITHOUT LONG-TERM CURRENT USE OF INSULIN (HCC): ICD-10-CM

## 2018-11-28 DIAGNOSIS — I10 ESSENTIAL (PRIMARY) HYPERTENSION: ICD-10-CM

## 2018-11-28 DIAGNOSIS — I48.91 ATRIAL FIBRILLATION (HCC): ICD-10-CM

## 2018-11-28 LAB
ANION GAP SERPL CALCULATED.3IONS-SCNC: 5 MMOL/L (ref 4–13)
BASOPHILS # BLD AUTO: 0.08 THOUSANDS/ΜL (ref 0–0.1)
BASOPHILS NFR BLD AUTO: 2 % (ref 0–1)
BILIRUB UR QL STRIP: NEGATIVE
BUN SERPL-MCNC: 21 MG/DL (ref 5–25)
CALCIUM SERPL-MCNC: 9.2 MG/DL (ref 8.3–10.1)
CHLORIDE SERPL-SCNC: 109 MMOL/L (ref 100–108)
CHOLEST SERPL-MCNC: 164 MG/DL (ref 50–200)
CLARITY UR: CLEAR
CO2 SERPL-SCNC: 28 MMOL/L (ref 21–32)
COLOR UR: YELLOW
CREAT SERPL-MCNC: 1.21 MG/DL (ref 0.6–1.3)
CREAT UR-MCNC: 196 MG/DL
EOSINOPHIL # BLD AUTO: 0.14 THOUSAND/ΜL (ref 0–0.61)
EOSINOPHIL NFR BLD AUTO: 3 % (ref 0–6)
ERYTHROCYTE [DISTWIDTH] IN BLOOD BY AUTOMATED COUNT: 14.2 % (ref 11.6–15.1)
EST. AVERAGE GLUCOSE BLD GHB EST-MCNC: 134 MG/DL
GFR SERPL CREATININE-BSD FRML MDRD: 63 ML/MIN/1.73SQ M
GLUCOSE P FAST SERPL-MCNC: 87 MG/DL (ref 65–99)
GLUCOSE UR STRIP-MCNC: NEGATIVE MG/DL
HBA1C MFR BLD: 6.3 % (ref 4.2–6.3)
HCT VFR BLD AUTO: 41.2 % (ref 36.5–49.3)
HDLC SERPL-MCNC: 45 MG/DL (ref 40–60)
HGB BLD-MCNC: 12.8 G/DL (ref 12–17)
HGB UR QL STRIP.AUTO: NEGATIVE
IMM GRANULOCYTES # BLD AUTO: 0.01 THOUSAND/UL (ref 0–0.2)
IMM GRANULOCYTES NFR BLD AUTO: 0 % (ref 0–2)
KETONES UR STRIP-MCNC: NEGATIVE MG/DL
LDLC SERPL CALC-MCNC: 101 MG/DL (ref 0–100)
LEUKOCYTE ESTERASE UR QL STRIP: NEGATIVE
LYMPHOCYTES # BLD AUTO: 2.1 THOUSANDS/ΜL (ref 0.6–4.47)
LYMPHOCYTES NFR BLD AUTO: 39 % (ref 14–44)
MCH RBC QN AUTO: 27.3 PG (ref 26.8–34.3)
MCHC RBC AUTO-ENTMCNC: 31.1 G/DL (ref 31.4–37.4)
MCV RBC AUTO: 88 FL (ref 82–98)
MICROALBUMIN UR-MCNC: 6.7 MG/L (ref 0–20)
MICROALBUMIN/CREAT 24H UR: 3 MG/G CREATININE (ref 0–30)
MONOCYTES # BLD AUTO: 0.41 THOUSAND/ΜL (ref 0.17–1.22)
MONOCYTES NFR BLD AUTO: 8 % (ref 4–12)
NEUTROPHILS # BLD AUTO: 2.69 THOUSANDS/ΜL (ref 1.85–7.62)
NEUTS SEG NFR BLD AUTO: 48 % (ref 43–75)
NITRITE UR QL STRIP: NEGATIVE
NONHDLC SERPL-MCNC: 119 MG/DL
NRBC BLD AUTO-RTO: 0 /100 WBCS
PH UR STRIP.AUTO: 6.5 [PH] (ref 4.5–8)
PLATELET # BLD AUTO: 310 THOUSANDS/UL (ref 149–390)
PMV BLD AUTO: 10.7 FL (ref 8.9–12.7)
POTASSIUM SERPL-SCNC: 4.5 MMOL/L (ref 3.5–5.3)
PROT UR STRIP-MCNC: NEGATIVE MG/DL
RBC # BLD AUTO: 4.69 MILLION/UL (ref 3.88–5.62)
SODIUM SERPL-SCNC: 142 MMOL/L (ref 136–145)
SP GR UR STRIP.AUTO: 1.02 (ref 1–1.03)
TRIGL SERPL-MCNC: 91 MG/DL
UROBILINOGEN UR QL STRIP.AUTO: 1 E.U./DL
WBC # BLD AUTO: 5.43 THOUSAND/UL (ref 4.31–10.16)

## 2018-11-28 PROCEDURE — 80061 LIPID PANEL: CPT

## 2018-11-28 PROCEDURE — 85025 COMPLETE CBC W/AUTO DIFF WBC: CPT

## 2018-11-28 PROCEDURE — 80048 BASIC METABOLIC PNL TOTAL CA: CPT

## 2018-11-28 PROCEDURE — 82570 ASSAY OF URINE CREATININE: CPT | Performed by: INTERNAL MEDICINE

## 2018-11-28 PROCEDURE — 81003 URINALYSIS AUTO W/O SCOPE: CPT | Performed by: INTERNAL MEDICINE

## 2018-11-28 PROCEDURE — 83036 HEMOGLOBIN GLYCOSYLATED A1C: CPT

## 2018-11-28 PROCEDURE — 82043 UR ALBUMIN QUANTITATIVE: CPT | Performed by: INTERNAL MEDICINE

## 2018-11-28 PROCEDURE — 3061F NEG MICROALBUMINURIA REV: CPT | Performed by: INTERNAL MEDICINE

## 2018-11-30 DIAGNOSIS — K21.9 GASTROESOPHAGEAL REFLUX DISEASE, ESOPHAGITIS PRESENCE NOT SPECIFIED: Primary | ICD-10-CM

## 2018-12-03 RX ORDER — OMEPRAZOLE 40 MG/1
40 CAPSULE, DELAYED RELEASE ORAL DAILY
Qty: 90 CAPSULE | Refills: 1 | Status: SHIPPED | OUTPATIENT
Start: 2018-12-03 | End: 2019-02-25 | Stop reason: SDUPTHER

## 2019-01-02 DIAGNOSIS — F32.A ANXIETY AND DEPRESSION: ICD-10-CM

## 2019-01-02 DIAGNOSIS — F41.9 ANXIETY AND DEPRESSION: ICD-10-CM

## 2019-01-03 RX ORDER — ALPRAZOLAM 0.25 MG/1
0.25 TABLET ORAL
Qty: 30 TABLET | Refills: 0 | Status: SHIPPED | OUTPATIENT
Start: 2019-01-03 | End: 2019-02-25 | Stop reason: SDUPTHER

## 2019-02-25 ENCOUNTER — OFFICE VISIT (OUTPATIENT)
Dept: INTERNAL MEDICINE CLINIC | Facility: CLINIC | Age: 64
End: 2019-02-25
Payer: COMMERCIAL

## 2019-02-25 VITALS
DIASTOLIC BLOOD PRESSURE: 76 MMHG | HEIGHT: 68 IN | OXYGEN SATURATION: 95 % | SYSTOLIC BLOOD PRESSURE: 122 MMHG | BODY MASS INDEX: 31.98 KG/M2 | HEART RATE: 66 BPM | WEIGHT: 211 LBS

## 2019-02-25 DIAGNOSIS — I10 BENIGN ESSENTIAL HYPERTENSION: Primary | ICD-10-CM

## 2019-02-25 DIAGNOSIS — K21.9 GASTROESOPHAGEAL REFLUX DISEASE, ESOPHAGITIS PRESENCE NOT SPECIFIED: ICD-10-CM

## 2019-02-25 DIAGNOSIS — I48.91 ATRIAL FIBRILLATION, UNSPECIFIED TYPE (HCC): ICD-10-CM

## 2019-02-25 DIAGNOSIS — F41.9 ANXIETY AND DEPRESSION: ICD-10-CM

## 2019-02-25 DIAGNOSIS — F32.A DEPRESSION, UNSPECIFIED DEPRESSION TYPE: ICD-10-CM

## 2019-02-25 DIAGNOSIS — R42 DIZZINESS: ICD-10-CM

## 2019-02-25 DIAGNOSIS — E78.00 HIGH CHOLESTEROL: ICD-10-CM

## 2019-02-25 DIAGNOSIS — F32.A ANXIETY AND DEPRESSION: ICD-10-CM

## 2019-02-25 DIAGNOSIS — E11.9 CONTROLLED TYPE 2 DIABETES MELLITUS WITHOUT COMPLICATION, WITHOUT LONG-TERM CURRENT USE OF INSULIN (HCC): ICD-10-CM

## 2019-02-25 PROCEDURE — 3078F DIAST BP <80 MM HG: CPT | Performed by: INTERNAL MEDICINE

## 2019-02-25 PROCEDURE — 3008F BODY MASS INDEX DOCD: CPT | Performed by: INTERNAL MEDICINE

## 2019-02-25 PROCEDURE — 3074F SYST BP LT 130 MM HG: CPT | Performed by: INTERNAL MEDICINE

## 2019-02-25 PROCEDURE — 99214 OFFICE O/P EST MOD 30 MIN: CPT | Performed by: INTERNAL MEDICINE

## 2019-02-25 PROCEDURE — 1036F TOBACCO NON-USER: CPT | Performed by: INTERNAL MEDICINE

## 2019-02-25 RX ORDER — ATORVASTATIN CALCIUM 40 MG/1
40 TABLET, FILM COATED ORAL DAILY
Qty: 90 TABLET | Refills: 1 | Status: SHIPPED | OUTPATIENT
Start: 2019-02-25 | End: 2019-11-12 | Stop reason: ALTCHOICE

## 2019-02-25 RX ORDER — ESCITALOPRAM OXALATE 10 MG/1
10 TABLET ORAL DAILY
Qty: 90 TABLET | Refills: 1 | Status: SHIPPED | OUTPATIENT
Start: 2019-02-25 | End: 2019-04-23 | Stop reason: SDUPTHER

## 2019-02-25 RX ORDER — OMEPRAZOLE 40 MG/1
40 CAPSULE, DELAYED RELEASE ORAL DAILY
Qty: 90 CAPSULE | Refills: 1 | Status: SHIPPED | OUTPATIENT
Start: 2019-02-25 | End: 2020-02-14

## 2019-02-25 RX ORDER — ALPRAZOLAM 0.25 MG/1
0.25 TABLET ORAL
Qty: 30 TABLET | Refills: 0 | Status: SHIPPED | OUTPATIENT
Start: 2019-02-25 | End: 2020-05-26 | Stop reason: SDUPTHER

## 2019-02-25 RX ORDER — DILTIAZEM HYDROCHLORIDE 120 MG/1
120 TABLET, FILM COATED ORAL DAILY
Qty: 90 TABLET | Refills: 1 | Status: SHIPPED | OUTPATIENT
Start: 2019-02-25 | End: 2019-07-02 | Stop reason: SDUPTHER

## 2019-02-25 RX ORDER — FENOFIBRATE 145 MG/1
145 TABLET, COATED ORAL DAILY
Qty: 90 TABLET | Refills: 1 | Status: SHIPPED | OUTPATIENT
Start: 2019-02-25 | End: 2020-05-26 | Stop reason: SDUPTHER

## 2019-02-25 NOTE — PROGRESS NOTES
Assessment/Plan:  Regarding his paroxysmal atrial fibrillation he is going to call his cardiologist today and start Xarelto  Further instructions per Cardiology  Regarding his diabetes is A1c is stable at 6 3 %period% creatinine 1 2  Blood pressure under control  Up-to-date on colonoscopies with 2 small polyps per    Spirits are fairly good at the present time  No other cardiopulmonary complaints  We will see him back in 4 months  Labs before  35 min spent with this patient in terms of counseling and physical exam   In review of data  No results found for this or any previous visit (from the past 1008 hour(s))  1  Benign essential hypertension     2  High cholesterol  diltiazem (CARDIZEM) 120 MG tablet    atorvastatin (LIPITOR) 40 mg tablet    fenofibrate (TRICOR) 145 mg tablet   3  Atrial fibrillation, unspecified type (HCC)  diltiazem (CARDIZEM) 120 MG tablet    fenofibrate (TRICOR) 145 mg tablet   4  Depression, unspecified depression type  escitalopram (LEXAPRO) 10 mg tablet   5  Controlled type 2 diabetes mellitus without complication, without long-term current use of insulin ([facility])  metFORMIN (GLUCOPHAGE) 500 mg tablet    Basic metabolic panel    Microalbumin / creatinine urine ratio    Lipid panel    HEMOGLOBIN A1C W/ EAG ESTIMATION   6  Gastroesophageal reflux disease, esophagitis presence not specified  omeprazole (PriLOSEC) 40 MG capsule   7  Anxiety and depression  ALPRAZolam (XANAX) 0 25 mg tablet   8  Dizziness  meclizine (ANTIVERT) 32 MG tablet       Orders Placed This Encounter   Procedures    Basic metabolic panel    Microalbumin / creatinine urine ratio    Lipid panel    HEMOGLOBIN A1C W/ EAG ESTIMATION         Subjective:  He has been going in and out of atrial fibrillation  He was supposed to see his cardiologist last Friday but the cardiologist canceled  He needs to get in touch with him today  He needs to start his Xarelto which he will do today    Further instructions per Cardiology  Otherwise doing well  Blood sugars are under fair control with an A1c of 6 3 %period% creatinine is 1 2  Patient ID: Margareth Arnold is a 59 y o  male  HPI    The following portions of the patient's history were reviewed and updated as appropriate:   He has a past medical history of At[facility] fibrillation [facility] ), Candidiasis, cutaneous, Colon, diverticulosis, Depression, Diabetes mellitus ([facility] ), Diverticulosis, GERD (gastroesophageal reflux disease), Hyperlipidemia, Hypertension, Internal hemorrhoids, Irregular heart beat, Pneumonia, Shortness of breath, Sleep apnea, Stomach cancer ([facility] ), and Vertigo of central origin  ,  does not have any pertinent problems on file  ,   has a past surgical history that includes Tonsillectomy; EGD AND COLONOSCOPY; Colonoscopy; pr esophagogastroduodenoscopy transoral diagnostic (N/A, 10/10/2017); and pr colonoscopy flx dx w/collj spec when pfrmd (N/A, 8/29/2018)  ,  family history includes Cancer in his family; Coronary artery disease in his mother; Diabetes in his father; Other in his father and mother  ,   reports that he quit smoking about 35 years ago  His smoking use included cigarettes  He smoked 2 00 packs per day  He has never used smokeless tobacco  He reports that he drinks alcohol  He reports that he does not use drugs  ,  has No Known Allergies       Current Outpatient Medications:     ALPRAZolam (XANAX) 0 25 mg tablet, Take 1 tablet (0 25 mg total) by mouth daily at bedtime as needed for anxiety, Disp: 30 tablet, Rfl: 0    aspirin (ECOTRIN) 325 mg EC tablet, Take 325 mg by mouth daily  , Disp: , Rfl:     atorvastatin (LIPITOR) 40 mg tablet, Take 1 tablet (40 mg total) by mouth daily, Disp: 90 tablet, Rfl: 1    diltiazem (CARDIZEM) 120 MG tablet, Take 1 tablet (120 mg total) by mouth daily, Disp: 90 tablet, Rfl: 1    escitalopram (LEXAPRO) 10 mg tablet, Take 1 tablet (10 mg total) by mouth daily, Disp: 90 tablet, Rfl: 1    fenofibrate (TRICOR) 145 mg tablet, Take 1 tablet (145 mg total) by mouth daily, Disp: 90 tablet, Rfl: 1    fexofenadine (ALLEGRA) 180 MG tablet, Take 1 tablet (180 mg total) by mouth daily (Patient taking differently: Take 180 mg by mouth as needed  ), Disp: 30 tablet, Rfl: 5    flecainide (TAMBOCOR) 100 mg tablet, Take 100 mg by mouth 2 (two) times a day, Disp: , Rfl:     meclizine (ANTIVERT) 32 MG tablet, Take 1 tablet (32 mg total) by mouth 3 (three) times a day as needed for dizziness, Disp: 90 tablet, Rfl: 1    metaxalone (SKELAXIN) 800 mg tablet, Take 1 tablet (800 mg total) by mouth 3 (three) times a day (Patient taking differently: Take 800 mg by mouth as needed ), Disp: 60 tablet, Rfl: 0    metFORMIN (GLUCOPHAGE) 500 mg tablet, Take 1 tablet (500 mg total) by mouth 2 (two) times a day with meals, Disp: 180 tablet, Rfl: 1    omeprazole (PriLOSEC) 40 MG capsule, Take 1 capsule (40 mg total) by mouth daily, Disp: 90 capsule, Rfl: 1    traMADol (ULTRAM) 50 mg tablet, Take 50 mg by mouth every 6 (six) hours as needed for moderate pain, Disp: , Rfl:     Review of Systems   Constitutional: Negative for activity change, appetite change, chills, diaphoresis, fatigue, fever and unexpected weight change  HENT: Negative for congestion, ear pain, hearing loss, mouth sores, nosebleeds, postnasal drip, sinus pressure, sinus pain, sore throat and trouble swallowing  Eyes: Negative for pain, discharge and visual disturbance  Respiratory: Negative for apnea, cough, chest tightness, shortness of breath and wheezing  Cardiovascular: Negative for chest pain, palpitations and leg swelling  He has paroxysmal atrial fibrillation and definitely feels when he gets appeared   Gastrointestinal: Negative for abdominal pain, anal bleeding, blood in stool, constipation, diarrhea, nausea and vomiting  Recently said had a colonoscopy with 2 small polyps   Endocrine: Negative for polydipsia and polyphagia  Genitourinary: Negative for decreased urine volume, dysuria, flank pain, frequency, hematuria and urgency  Last PSA was in August of 2018   Musculoskeletal: Negative for arthralgias, back pain, gait problem, joint swelling and myalgias  Skin: Negative for rash and wound  Allergic/Immunologic: Negative for environmental allergies and food allergies  Neurological: Negative for dizziness, tremors, seizures, syncope, speech difficulty, light-headedness, numbness and headaches  Hematological: Negative for adenopathy  Does not bruise/bleed easily  Psychiatric/Behavioral: Negative for agitation, confusion, hallucinations, sleep disturbance and suicidal ideas  The patient is not nervous/anxious  Spirits are fairly good at this time  Objective:  /76 (BP Location: Left arm, Patient Position: Sitting, Cuff Size: Standard)   Pulse 66   Ht 5' 8" (1 727 m) Comment: w/ shoe denied off  Wt 95 7 kg (211 lb) Comment: w/ shoe denied off  SpO2 95%   BMI 32 08 kg/m²      Physical Exam   Constitutional: He appears well-developed and well-nourished  No distress  HENT:   Head: Normocephalic  Right Ear: External ear normal    Left Ear: External ear normal    Nose: Nose normal    Mouth/Throat: Oropharynx is clear and moist  No oropharyngeal exudate  Eyes: Pupils are equal, round, and reactive to light  Conjunctivae and EOM are normal  Right eye exhibits no discharge  Left eye exhibits no discharge  Neck: Normal range of motion  Neck supple  No thyromegaly present  Cardiovascular: Normal rate, regular rhythm, normal heart sounds and intact distal pulses  Exam reveals no gallop and no friction rub  No murmur heard  Pulses:       Dorsalis pedis pulses are 2+ on the right side, and 2+ on the left side  Posterior tibial pulses are 2+ on the right side, and 2+ on the left side  Rhythm is regular the present time  Rate is 80  No murmur     Pulmonary/Chest: Effort normal and breath sounds normal  No respiratory distress  He has no wheezes  He has no rales  Abdominal: Soft  Bowel sounds are normal  He exhibits no distension and no mass  There is no tenderness  There is no rebound and no guarding  Musculoskeletal: Normal range of motion  He exhibits no edema, tenderness or deformity  Feet:   Right Foot:   Skin Integrity: Negative for ulcer, skin breakdown, erythema, warmth, callus or dry skin  Left Foot:   Skin Integrity: Negative for ulcer, skin breakdown, erythema, warmth, callus or dry skin  Lymphadenopathy:     He has no cervical adenopathy  Neurological: He is alert  He has normal reflexes  He displays normal reflexes  No cranial nerve deficit  Coordination normal    Skin: Skin is warm and dry  No rash noted  No erythema  Psychiatric: He has a normal mood and affect  His behavior is normal  Judgment and thought content normal    Nursing note and vitals reviewed  Patient's shoes and socks removed  Right Foot/Ankle   Right Foot Inspection  Skin Exam: skin normal and skin intact no dry skin, no warmth, no callus, no erythema, no maceration, no abnormal color, no pre-ulcer, no ulcer and no callus                          Toe Exam: no swelling, no tenderness, erythema and  no right toe deformity    Vascular    The right DP pulse is 2+  The right PT pulse is 2+  Left Foot/Ankle  Left Foot Inspection  Skin Exam: skin normal and skin intactno dry skin, no warmth, no erythema, no maceration, normal color, no pre-ulcer, no ulcer and no callus                         Toe Exam: no tenderness and no left toe deformity                     Vascular    The left DP pulse is 2+  The left PT pulse is 2+  Assign Risk Category:  No deformity present;  No loss of protective sensation;        Risk: 0

## 2019-04-23 DIAGNOSIS — F32.A DEPRESSION, UNSPECIFIED DEPRESSION TYPE: ICD-10-CM

## 2019-04-23 RX ORDER — ESCITALOPRAM OXALATE 10 MG/1
TABLET ORAL
Qty: 30 TABLET | Refills: 5 | Status: SHIPPED | OUTPATIENT
Start: 2019-04-23 | End: 2020-04-24

## 2019-06-21 ENCOUNTER — APPOINTMENT (OUTPATIENT)
Dept: LAB | Facility: CLINIC | Age: 64
End: 2019-06-21
Payer: COMMERCIAL

## 2019-06-21 DIAGNOSIS — E11.9 CONTROLLED TYPE 2 DIABETES MELLITUS WITHOUT COMPLICATION, WITHOUT LONG-TERM CURRENT USE OF INSULIN (HCC): ICD-10-CM

## 2019-06-21 LAB
ANION GAP SERPL CALCULATED.3IONS-SCNC: 7 MMOL/L (ref 4–13)
BUN SERPL-MCNC: 21 MG/DL (ref 5–25)
CALCIUM SERPL-MCNC: 9.2 MG/DL (ref 8.3–10.1)
CHLORIDE SERPL-SCNC: 109 MMOL/L (ref 100–108)
CHOLEST SERPL-MCNC: 162 MG/DL (ref 50–200)
CO2 SERPL-SCNC: 27 MMOL/L (ref 21–32)
CREAT SERPL-MCNC: 1.2 MG/DL (ref 0.6–1.3)
CREAT UR-MCNC: 224 MG/DL
EST. AVERAGE GLUCOSE BLD GHB EST-MCNC: 137 MG/DL
GFR SERPL CREATININE-BSD FRML MDRD: 64 ML/MIN/1.73SQ M
GLUCOSE P FAST SERPL-MCNC: 94 MG/DL (ref 65–99)
HBA1C MFR BLD: 6.4 % (ref 4.2–6.3)
HDLC SERPL-MCNC: 43 MG/DL (ref 40–60)
LDLC SERPL CALC-MCNC: 102 MG/DL (ref 0–100)
MICROALBUMIN UR-MCNC: 10.6 MG/L (ref 0–20)
MICROALBUMIN/CREAT 24H UR: 5 MG/G CREATININE (ref 0–30)
NONHDLC SERPL-MCNC: 119 MG/DL
POTASSIUM SERPL-SCNC: 4.1 MMOL/L (ref 3.5–5.3)
SODIUM SERPL-SCNC: 143 MMOL/L (ref 136–145)
TRIGL SERPL-MCNC: 84 MG/DL

## 2019-06-21 PROCEDURE — 80061 LIPID PANEL: CPT

## 2019-06-21 PROCEDURE — 82570 ASSAY OF URINE CREATININE: CPT

## 2019-06-21 PROCEDURE — 82043 UR ALBUMIN QUANTITATIVE: CPT

## 2019-06-21 PROCEDURE — 80048 BASIC METABOLIC PNL TOTAL CA: CPT

## 2019-06-21 PROCEDURE — 83036 HEMOGLOBIN GLYCOSYLATED A1C: CPT

## 2019-06-21 PROCEDURE — 3061F NEG MICROALBUMINURIA REV: CPT | Performed by: INTERNAL MEDICINE

## 2019-06-21 PROCEDURE — 36415 COLL VENOUS BLD VENIPUNCTURE: CPT

## 2019-06-29 DIAGNOSIS — K21.9 GASTROESOPHAGEAL REFLUX DISEASE, ESOPHAGITIS PRESENCE NOT SPECIFIED: ICD-10-CM

## 2019-07-01 ENCOUNTER — TELEPHONE (OUTPATIENT)
Dept: INTERNAL MEDICINE CLINIC | Facility: CLINIC | Age: 64
End: 2019-07-01

## 2019-07-01 RX ORDER — OMEPRAZOLE 40 MG/1
CAPSULE, DELAYED RELEASE ORAL
Qty: 90 CAPSULE | Refills: 1 | Status: SHIPPED | OUTPATIENT
Start: 2019-07-01 | End: 2019-07-02 | Stop reason: SDUPTHER

## 2019-07-02 ENCOUNTER — APPOINTMENT (OUTPATIENT)
Dept: RADIOLOGY | Facility: CLINIC | Age: 64
End: 2019-07-02
Payer: COMMERCIAL

## 2019-07-02 ENCOUNTER — OFFICE VISIT (OUTPATIENT)
Dept: INTERNAL MEDICINE CLINIC | Facility: CLINIC | Age: 64
End: 2019-07-02
Payer: COMMERCIAL

## 2019-07-02 VITALS
OXYGEN SATURATION: 98 % | DIASTOLIC BLOOD PRESSURE: 90 MMHG | HEIGHT: 68 IN | BODY MASS INDEX: 31.22 KG/M2 | HEART RATE: 55 BPM | SYSTOLIC BLOOD PRESSURE: 140 MMHG | WEIGHT: 206 LBS

## 2019-07-02 DIAGNOSIS — F41.9 ANXIETY AND DEPRESSION: ICD-10-CM

## 2019-07-02 DIAGNOSIS — R05.9 COUGH: ICD-10-CM

## 2019-07-02 DIAGNOSIS — I10 BENIGN ESSENTIAL HYPERTENSION: ICD-10-CM

## 2019-07-02 DIAGNOSIS — E78.00 HIGH CHOLESTEROL: ICD-10-CM

## 2019-07-02 DIAGNOSIS — E11.9 CONTROLLED TYPE 2 DIABETES MELLITUS WITHOUT COMPLICATION, WITHOUT LONG-TERM CURRENT USE OF INSULIN (HCC): ICD-10-CM

## 2019-07-02 DIAGNOSIS — I48.91 ATRIAL FIBRILLATION, UNSPECIFIED TYPE (HCC): Primary | ICD-10-CM

## 2019-07-02 DIAGNOSIS — F32.A ANXIETY AND DEPRESSION: ICD-10-CM

## 2019-07-02 PROCEDURE — 1036F TOBACCO NON-USER: CPT | Performed by: INTERNAL MEDICINE

## 2019-07-02 PROCEDURE — 3008F BODY MASS INDEX DOCD: CPT | Performed by: INTERNAL MEDICINE

## 2019-07-02 PROCEDURE — 99214 OFFICE O/P EST MOD 30 MIN: CPT | Performed by: INTERNAL MEDICINE

## 2019-07-02 PROCEDURE — 71046 X-RAY EXAM CHEST 2 VIEWS: CPT

## 2019-07-02 RX ORDER — FLECAINIDE ACETATE 100 MG/1
100 TABLET ORAL 2 TIMES DAILY
Qty: 60 TABLET | Refills: 5 | Status: CANCELLED | OUTPATIENT
Start: 2019-07-02

## 2019-07-02 RX ORDER — RIVAROXABAN 20 MG/1
TABLET, FILM COATED ORAL
Refills: 0 | COMMUNITY
Start: 2019-06-29 | End: 2019-07-02 | Stop reason: SDUPTHER

## 2019-07-02 RX ORDER — RIVAROXABAN 20 MG/1
20 TABLET, FILM COATED ORAL
Qty: 30 TABLET | Refills: 5 | Status: SHIPPED | OUTPATIENT
Start: 2019-07-02 | End: 2020-12-30 | Stop reason: SDUPTHER

## 2019-07-02 RX ORDER — DILTIAZEM HYDROCHLORIDE 120 MG/1
120 TABLET, FILM COATED ORAL DAILY
Qty: 90 TABLET | Refills: 5 | Status: SHIPPED | OUTPATIENT
Start: 2019-07-02 | End: 2020-05-26 | Stop reason: SDUPTHER

## 2019-07-02 NOTE — PROGRESS NOTES
Assessment/Plan:  Regarding atrial fibrillation he is followed by the cardiologist   No bleeding from his anticoagulation  Regarding his diabetes is A1c is 6 4  LDL cholesterol little high at 102  Will modify behavior in dietary measures  Does have a bit of a persistent cough  Will get a chest x-ray  Otherwise stable will see him in 4 months  Before if any problems  1  Atrial fibrillation, unspecified type (HCC)  diltiazem (CARDIZEM) 120 MG tablet    XARELTO 20 MG tablet    CBC and differential   2  High cholesterol  diltiazem (CARDIZEM) 120 MG tablet   3  Controlled type 2 diabetes mellitus without complication, without long-term current use of insulin (HCC)  metFORMIN (GLUCOPHAGE) 500 mg tablet    Comprehensive metabolic panel    HEMOGLOBIN A1C W/ EAG ESTIMATION    Lipid panel   4  Anxiety and depression     5  Benign essential hypertension  PSA Total, Diagnostic   6  Cough  XR chest pa & lateral       Orders Placed This Encounter   Procedures    XR chest pa & lateral    CBC and differential    Comprehensive metabolic panel    HEMOGLOBIN A1C W/ EAG ESTIMATION    Lipid panel    PSA Total, Diagnostic         Subjective:  He comes in for follow-up on the above problems  He is doing fairly well  Has not had any angina  Continues to be followed by Cardiology  Has hyperlipidemia  Recent cholesterol somewhat elevated at 102  Has a long history of some anxiety with depression  Doing well on his current medicines  Is anticoagulated for atrial fibrillation  Patient ID: Ioana Steel is a 59 y o  male      HPI    The following portions of the patient's history were reviewed and updated as appropriate:   He has a past medical history of Atrial fibrillation ([address] ), Candidiasis, cutaneous, Colon, diverticulosis, Depression, Diabetes mellitus ([address] ), Diverticulosis, GERD (gastroesophageal reflux disease), Hyperlipidemia, Hypertension, Internal hemorrhoids, Irregular heart beat, Pneumonia, Shortness of breath, Sleep apnea, and Vertigo of central origin  ,  does not have any pertinent problems on file  ,   has a past surgical history that includes Tonsillectomy; EGD AND COLONOSCOPY; Colonoscopy; pr esophagogastroduodenoscopy transoral diagnostic (N/A, 10/10/2017); and pr colonoscopy flx dx w/collj spec when pfrmd (N/A, 8/29/2018)  ,  family history includes Cancer in his family; Coronary artery disease in his mother; Diabetes in his father; Other in his father and mother  ,   reports that he quit smoking about 35 years ago  His smoking use included cigarettes  He smoked 2 00 packs per day  He has never used smokeless tobacco  He reports that he drinks alcohol  He reports that he does not use drugs  ,  has No Known Allergies       Current Outpatient Medications:     ALPRAZolam (XANAX) 0 25 mg tablet, Take 1 tablet (0 25 mg total) by mouth daily at bedtime as needed for anxiety, Disp: 30 tablet, Rfl: 0    aspirin (ECOTRIN) 325 mg EC tablet, Take 325 mg by mouth daily  , Disp: , Rfl:     atorvastatin (LIPITOR) 40 mg tablet, Take 1 tablet (40 mg total) by mouth daily, Disp: 90 tablet, Rfl: 1    diltiazem (CARDIZEM) 120 MG tablet, Take 1 tablet (120 mg total) by mouth daily, Disp: 90 tablet, Rfl: 5    escitalopram (LEXAPRO) 10 mg tablet, take 1 tablet by mouth once daily, Disp: 30 tablet, Rfl: 5    fenofibrate (TRICOR) 145 mg tablet, Take 1 tablet (145 mg total) by mouth daily, Disp: 90 tablet, Rfl: 1    fexofenadine (ALLEGRA) 180 MG tablet, Take 1 tablet (180 mg total) by mouth daily (Patient taking differently: Take 180 mg by mouth as needed  ), Disp: 30 tablet, Rfl: 5    flecainide (TAMBOCOR) 100 mg tablet, Take 100 mg by mouth 2 (two) times a day, Disp: , Rfl:     meclizine (ANTIVERT) 32 MG tablet, Take 1 tablet (32 mg total) by mouth 3 (three) times a day as needed for dizziness, Disp: 90 tablet, Rfl: 1    metaxalone (SKELAXIN) 800 mg tablet, Take 1 tablet (800 mg total) by mouth 3 (three) times a day (Patient taking differently: Take 800 mg by mouth as needed ), Disp: 60 tablet, Rfl: 0    metFORMIN (GLUCOPHAGE) 500 mg tablet, Take 1 tablet (500 mg total) by mouth 2 (two) times a day with meals, Disp: 180 tablet, Rfl: 5    omeprazole (PriLOSEC) 40 MG capsule, Take 1 capsule (40 mg total) by mouth daily, Disp: 90 capsule, Rfl: 1    traMADol (ULTRAM) 50 mg tablet, Take 50 mg by mouth every 6 (six) hours as needed for moderate pain, Disp: , Rfl:     XARELTO 20 MG tablet, Take 1 tablet (20 mg total) by mouth daily with breakfast, Disp: 30 tablet, Rfl: 5    Review of Systems   Constitutional: Negative for activity change, appetite change, chills, diaphoresis, fatigue, fever and unexpected weight change  HENT: Positive for hearing loss  Negative for congestion, ear pain, mouth sores, nosebleeds, postnasal drip, sinus pressure, sinus pain, sore throat and trouble swallowing  Eyes: Negative for pain, discharge and visual disturbance  Up-to-date on eye checkups   Respiratory: Negative for apnea, cough, chest tightness, shortness of breath and wheezing  Cardiovascular: Negative for chest pain, palpitations and leg swelling  Positive history of atrial fibrillation  Anticoagulated  Gastrointestinal: Negative for abdominal pain, anal bleeding, blood in stool, constipation, diarrhea, nausea and vomiting  Endocrine: Negative for polydipsia and polyphagia  Genitourinary: Negative for decreased urine volume, dysuria, flank pain, frequency, hematuria and urgency  Musculoskeletal: Negative for arthralgias, back pain, gait problem, joint swelling and myalgias  Skin: Negative for rash and wound  Allergic/Immunologic: Negative for environmental allergies and food allergies  Neurological: Negative for dizziness, tremors, seizures, syncope, speech difficulty, light-headedness, numbness and headaches  Hematological: Negative for adenopathy  Does not bruise/bleed easily  Psychiatric/Behavioral: Negative for agitation, confusion, hallucinations, sleep disturbance and suicidal ideas  The patient is not nervous/anxious  Objective:  /90   Pulse 55   Ht 5' 8" (1 727 m)   Wt 93 4 kg (206 lb)   SpO2 98%   BMI 31 32 kg/m²      Physical Exam   Constitutional: He appears well-developed and well-nourished  No distress  Blood pressure is 134/80  Rhythm is irregularly irregular  Rate is in the 80s  Respirations are 20  HENT:   Head: Normocephalic  Right Ear: External ear normal    Left Ear: External ear normal    Nose: Nose normal    Mouth/Throat: Oropharynx is clear and moist  No oropharyngeal exudate  Eyes: Pupils are equal, round, and reactive to light  Conjunctivae and EOM are normal  Right eye exhibits no discharge  Left eye exhibits no discharge  Neck: Normal range of motion  Neck supple  No thyromegaly present  Cardiovascular: Normal rate, normal heart sounds and intact distal pulses  Exam reveals no gallop and no friction rub  No murmur heard  Rhythm is irregularly irregular  No murmur  Pulmonary/Chest: Effort normal and breath sounds normal  No respiratory distress  He has no wheezes  He has no rales  Abdominal: Soft  Bowel sounds are normal  He exhibits no distension and no mass  There is no tenderness  There is no rebound and no guarding  Abdomen slightly overweight  Musculoskeletal: Normal range of motion  He exhibits no edema, tenderness or deformity  Lymphadenopathy:     He has no cervical adenopathy  Neurological: He is alert  He has normal reflexes  He displays normal reflexes  No cranial nerve deficit  Coordination normal    Skin: Skin is warm and dry  No rash noted  No erythema  Psychiatric: He has a normal mood and affect  His behavior is normal  Judgment and thought content normal    Nursing note and vitals reviewed          Recent Results (from the past 1008 hour(s))   Basic metabolic panel    Collection Time: 06/21/19 9:04 AM   Result Value Ref Range    Sodium 143 136 - 145 mmol/L    Potassium 4 1 3 5 - 5 3 mmol/L    Chloride 109 (H) 100 - 108 mmol/L    CO2 27 21 - 32 mmol/L    ANION GAP 7 4 - 13 mmol/L    BUN 21 5 - 25 mg/dL    Creatinine 1 20 0 60 - 1 30 mg/dL    Glucose, Fasting 94 65 - 99 mg/dL    Calcium 9 2 8 3 - 10 1 mg/dL    eGFR 64 ml/min/1 73sq m   Microalbumin / creatinine urine ratio    Collection Time: 06/21/19  9:04 AM   Result Value Ref Range    Creatinine, Ur 224 0 mg/dL    Microalbum  ,U,Random 10 6 0 0 - 20 0 mg/L    Microalb Creat Ratio 5 0 - 30 mg/g creatinine   Lipid panel    Collection Time: 06/21/19  9:04 AM   Result Value Ref Range    Cholesterol 162 50 - 200 mg/dL    Triglycerides 84 <=150 mg/dL    HDL, Direct 43 40 - 60 mg/dL    LDL Calculated 102 (H) 0 - 100 mg/dL    Non-HDL-Chol (CHOL-HDL) 119 mg/dl   HEMOGLOBIN A1C W/ EAG ESTIMATION    Collection Time: 06/21/19  9:04 AM   Result Value Ref Range    Hemoglobin A1C 6 4 (H) 4 2 - 6 3 %     mg/dl

## 2019-07-03 ENCOUNTER — TELEPHONE (OUTPATIENT)
Dept: INTERNAL MEDICINE CLINIC | Facility: CLINIC | Age: 64
End: 2019-07-03

## 2019-07-05 ENCOUNTER — TELEPHONE (OUTPATIENT)
Dept: INTERNAL MEDICINE CLINIC | Facility: CLINIC | Age: 64
End: 2019-07-05

## 2019-07-05 NOTE — TELEPHONE ENCOUNTER
----- Message from Berta Redman DO sent at 7/5/2019 12:33 PM EDT -----  Call patient    Chest x-ray was okay

## 2019-10-25 LAB
LEFT EYE DIABETIC RETINOPATHY: NORMAL
RIGHT EYE DIABETIC RETINOPATHY: NORMAL

## 2019-10-29 ENCOUNTER — APPOINTMENT (OUTPATIENT)
Dept: LAB | Facility: CLINIC | Age: 64
End: 2019-10-29
Payer: COMMERCIAL

## 2019-10-29 DIAGNOSIS — E11.9 CONTROLLED TYPE 2 DIABETES MELLITUS WITHOUT COMPLICATION, WITHOUT LONG-TERM CURRENT USE OF INSULIN (HCC): ICD-10-CM

## 2019-10-29 DIAGNOSIS — I48.91 ATRIAL FIBRILLATION, UNSPECIFIED TYPE (HCC): ICD-10-CM

## 2019-10-29 DIAGNOSIS — I10 BENIGN ESSENTIAL HYPERTENSION: ICD-10-CM

## 2019-10-29 LAB
ALBUMIN SERPL BCP-MCNC: 4 G/DL (ref 3.5–5)
ALP SERPL-CCNC: 45 U/L (ref 46–116)
ALT SERPL W P-5'-P-CCNC: 22 U/L (ref 12–78)
ANION GAP SERPL CALCULATED.3IONS-SCNC: 6 MMOL/L (ref 4–13)
AST SERPL W P-5'-P-CCNC: 21 U/L (ref 5–45)
BASOPHILS # BLD AUTO: 0.1 THOUSANDS/ΜL (ref 0–0.1)
BASOPHILS NFR BLD AUTO: 2 % (ref 0–1)
BILIRUB SERPL-MCNC: 0.43 MG/DL (ref 0.2–1)
BUN SERPL-MCNC: 19 MG/DL (ref 5–25)
CALCIUM SERPL-MCNC: 9.1 MG/DL (ref 8.3–10.1)
CHLORIDE SERPL-SCNC: 110 MMOL/L (ref 100–108)
CHOLEST SERPL-MCNC: 181 MG/DL (ref 50–200)
CO2 SERPL-SCNC: 27 MMOL/L (ref 21–32)
CREAT SERPL-MCNC: 1.13 MG/DL (ref 0.6–1.3)
EOSINOPHIL # BLD AUTO: 0.19 THOUSAND/ΜL (ref 0–0.61)
EOSINOPHIL NFR BLD AUTO: 3 % (ref 0–6)
ERYTHROCYTE [DISTWIDTH] IN BLOOD BY AUTOMATED COUNT: 15 % (ref 11.6–15.1)
EST. AVERAGE GLUCOSE BLD GHB EST-MCNC: 140 MG/DL
GFR SERPL CREATININE-BSD FRML MDRD: 68 ML/MIN/1.73SQ M
GLUCOSE P FAST SERPL-MCNC: 92 MG/DL (ref 65–99)
HBA1C MFR BLD: 6.5 % (ref 4.2–6.3)
HCT VFR BLD AUTO: 42.2 % (ref 36.5–49.3)
HDLC SERPL-MCNC: 45 MG/DL
HGB BLD-MCNC: 13.1 G/DL (ref 12–17)
IMM GRANULOCYTES # BLD AUTO: 0.01 THOUSAND/UL (ref 0–0.2)
IMM GRANULOCYTES NFR BLD AUTO: 0 % (ref 0–2)
LDLC SERPL CALC-MCNC: 114 MG/DL (ref 0–100)
LYMPHOCYTES # BLD AUTO: 1.89 THOUSANDS/ΜL (ref 0.6–4.47)
LYMPHOCYTES NFR BLD AUTO: 31 % (ref 14–44)
MCH RBC QN AUTO: 27.1 PG (ref 26.8–34.3)
MCHC RBC AUTO-ENTMCNC: 31 G/DL (ref 31.4–37.4)
MCV RBC AUTO: 87 FL (ref 82–98)
MONOCYTES # BLD AUTO: 0.48 THOUSAND/ΜL (ref 0.17–1.22)
MONOCYTES NFR BLD AUTO: 8 % (ref 4–12)
NEUTROPHILS # BLD AUTO: 3.46 THOUSANDS/ΜL (ref 1.85–7.62)
NEUTS SEG NFR BLD AUTO: 56 % (ref 43–75)
NONHDLC SERPL-MCNC: 136 MG/DL
NRBC BLD AUTO-RTO: 0 /100 WBCS
PLATELET # BLD AUTO: 312 THOUSANDS/UL (ref 149–390)
PMV BLD AUTO: 10.8 FL (ref 8.9–12.7)
POTASSIUM SERPL-SCNC: 4.1 MMOL/L (ref 3.5–5.3)
PROT SERPL-MCNC: 7.2 G/DL (ref 6.4–8.2)
PSA SERPL-MCNC: 0.5 NG/ML (ref 0–4)
RBC # BLD AUTO: 4.84 MILLION/UL (ref 3.88–5.62)
SODIUM SERPL-SCNC: 143 MMOL/L (ref 136–145)
TRIGL SERPL-MCNC: 110 MG/DL
WBC # BLD AUTO: 6.13 THOUSAND/UL (ref 4.31–10.16)

## 2019-10-29 PROCEDURE — 80053 COMPREHEN METABOLIC PANEL: CPT

## 2019-10-29 PROCEDURE — 83036 HEMOGLOBIN GLYCOSYLATED A1C: CPT

## 2019-10-29 PROCEDURE — 80061 LIPID PANEL: CPT

## 2019-10-29 PROCEDURE — 36415 COLL VENOUS BLD VENIPUNCTURE: CPT

## 2019-10-29 PROCEDURE — 84153 ASSAY OF PSA TOTAL: CPT

## 2019-10-29 PROCEDURE — 85025 COMPLETE CBC W/AUTO DIFF WBC: CPT

## 2019-11-12 ENCOUNTER — OFFICE VISIT (OUTPATIENT)
Dept: INTERNAL MEDICINE CLINIC | Facility: CLINIC | Age: 64
End: 2019-11-12
Payer: COMMERCIAL

## 2019-11-12 VITALS
WEIGHT: 205 LBS | SYSTOLIC BLOOD PRESSURE: 130 MMHG | HEART RATE: 63 BPM | BODY MASS INDEX: 31.07 KG/M2 | OXYGEN SATURATION: 98 % | HEIGHT: 68 IN | DIASTOLIC BLOOD PRESSURE: 82 MMHG

## 2019-11-12 DIAGNOSIS — M25.512 CHRONIC LEFT SHOULDER PAIN: ICD-10-CM

## 2019-11-12 DIAGNOSIS — I48.91 ATRIAL FIBRILLATION, UNSPECIFIED TYPE (HCC): ICD-10-CM

## 2019-11-12 DIAGNOSIS — F41.9 ANXIETY AND DEPRESSION: ICD-10-CM

## 2019-11-12 DIAGNOSIS — E78.00 HIGH CHOLESTEROL: ICD-10-CM

## 2019-11-12 DIAGNOSIS — E11.9 CONTROLLED TYPE 2 DIABETES MELLITUS WITHOUT COMPLICATION, WITHOUT LONG-TERM CURRENT USE OF INSULIN (HCC): ICD-10-CM

## 2019-11-12 DIAGNOSIS — L98.9 SKIN LESION: ICD-10-CM

## 2019-11-12 DIAGNOSIS — I10 BENIGN ESSENTIAL HYPERTENSION: Primary | ICD-10-CM

## 2019-11-12 DIAGNOSIS — M62.838 MUSCLE SPASM: ICD-10-CM

## 2019-11-12 DIAGNOSIS — F32.A ANXIETY AND DEPRESSION: ICD-10-CM

## 2019-11-12 DIAGNOSIS — G89.29 CHRONIC LEFT SHOULDER PAIN: ICD-10-CM

## 2019-11-12 DIAGNOSIS — R42 VERTIGO: ICD-10-CM

## 2019-11-12 PROCEDURE — 99214 OFFICE O/P EST MOD 30 MIN: CPT | Performed by: INTERNAL MEDICINE

## 2019-11-12 PROCEDURE — 1036F TOBACCO NON-USER: CPT | Performed by: INTERNAL MEDICINE

## 2019-11-12 RX ORDER — FLECAINIDE ACETATE 100 MG/1
100 TABLET ORAL 2 TIMES DAILY
Qty: 60 TABLET | Refills: 3 | Status: CANCELLED | OUTPATIENT
Start: 2019-11-12

## 2019-11-12 RX ORDER — METAXALONE 800 MG/1
800 TABLET ORAL AS NEEDED
Qty: 90 TABLET | Refills: 3 | Status: SHIPPED | OUTPATIENT
Start: 2019-11-12 | End: 2022-03-16

## 2019-11-12 RX ORDER — MECLIZINE HYDROCHLORIDE 25 MG/1
50 TABLET ORAL EVERY 8 HOURS PRN
Qty: 270 TABLET | Refills: 3 | Status: SHIPPED | OUTPATIENT
Start: 2019-11-12 | End: 2020-05-26 | Stop reason: SDUPTHER

## 2019-11-12 RX ORDER — ROSUVASTATIN CALCIUM 40 MG/1
40 TABLET, COATED ORAL DAILY
Qty: 90 TABLET | Refills: 3 | Status: SHIPPED | OUTPATIENT
Start: 2019-11-12 | End: 2020-05-26 | Stop reason: SDUPTHER

## 2019-11-12 RX ORDER — DILTIAZEM HYDROCHLORIDE 120 MG/1
120 TABLET, FILM COATED ORAL DAILY
Qty: 90 TABLET | Refills: 5 | Status: CANCELLED | OUTPATIENT
Start: 2019-11-12

## 2019-11-13 NOTE — PROGRESS NOTES
Assessment/Plan:  1  See Orthopedics 2  Continue medicines 3  Labs reviewed A1c 6 5  PSA 0 5  Follow-up with Dermatology for complete skin checkup  See me back in 4 months    1  Benign essential hypertension     2  Muscle spasm  metaxalone (SKELAXIN) 800 mg tablet   3  Controlled type 2 diabetes mellitus without complication, without long-term current use of insulin (HCC)  metFORMIN (GLUCOPHAGE) 500 mg tablet    CBC and differential    Comprehensive metabolic panel    HEMOGLOBIN A1C W/ EAG ESTIMATION    Lipid panel   4  High cholesterol  rosuvastatin (CRESTOR) 40 MG tablet   5  Atrial fibrillation, unspecified type ([address] )     6  Anxiety and depression     7  Skin lesion  Ambulatory referral to Dermatology   8  Chronic left shoulder pain  Ambulatory referral to Orthopedic Surgery   9  Vertigo  meclizine (ANTIVERT) 25 mg tablet       Orders Placed This Encounter   Procedures    CBC and differential    Comprehensive metabolic panel    HEMOGLOBIN A1C W/ EAG ESTIMATION    Lipid panel    Ambulatory referral to Dermatology    Ambulatory referral to Orthopedic Surgery         Subjective:  He has multiple problems as listed  He is complaining of left shoulder discomfort  This is been present for some time  He either has a capsulitis or some rotator cuff injury  He wants to [facility] to [facility]  Otherwise he has been reasonably stable  Has atrial fibrillation and is anticoagulated  Also has hypertension and depression  He has type 2 diabetes on metformin  Patient ID: Clement Conroy is a 59 y o  male      HPI    The following portions of the patient's history were reviewed and updated as appropriate:   He has a past medical history of Atrial fibrillation ([address] ), Candidiasis, cutaneous, Colon, diverticulosis, Depression, Diabetes mellitus ([address] ), Diverticulosis, GERD (gastroesophageal reflux disease), Hyperlipidemia, Hypertension, Internal hemorrhoids, Irregular heart beat, Pneumonia, Shortness of breath, Sleep apnea, and Vertigo of central origin  ,  does not have any pertinent problems on file  ,   has a past surgical history that includes Tonsillectomy; EGD AND COLONOSCOPY; Colonoscopy; pr esophagogastroduodenoscopy transoral diagnostic (N/A, 10/10/2017); and pr colonoscopy flx dx w/collj spec when pfrmd (N/A, 8/29/2018)  ,  family history includes Cancer in his family; Coronary artery disease in his mother; Diabetes in his father; Other in his father and mother  ,   reports that he quit smoking about 36 years ago  His smoking use included cigarettes  He smoked 2 00 packs per day  He has never used smokeless tobacco  He reports that he drinks alcohol  He reports that he does not use drugs  ,  has No Known Allergies       Current Outpatient Medications:     ALPRAZolam (XANAX) 0 25 mg tablet, Take 1 tablet (0 25 mg total) by mouth daily at bedtime as needed for anxiety, Disp: 30 tablet, Rfl: 0    diltiazem (CARDIZEM) 120 MG tablet, Take 1 tablet (120 mg total) by mouth daily, Disp: 90 tablet, Rfl: 5    escitalopram (LEXAPRO) 10 mg tablet, take 1 tablet by mouth once daily, Disp: 30 tablet, Rfl: 5    fenofibrate (TRICOR) 145 mg tablet, Take 1 tablet (145 mg total) by mouth daily, Disp: 90 tablet, Rfl: 1    fexofenadine (ALLEGRA) 180 MG tablet, Take 1 tablet (180 mg total) by mouth daily (Patient taking differently: Take 180 mg by mouth as needed  ), Disp: 30 tablet, Rfl: 5    flecainide (TAMBOCOR) 100 mg tablet, Take 100 mg by mouth 2 (two) times a day, Disp: , Rfl:     metaxalone (SKELAXIN) 800 mg tablet, Take 1 tablet (800 mg total) by mouth as needed for muscle spasms, Disp: 90 tablet, Rfl: 3    metFORMIN (GLUCOPHAGE) 500 mg tablet, Take 1 tablet (500 mg total) by mouth 2 (two) times a day with meals, Disp: 180 tablet, Rfl: 5    omeprazole (PriLOSEC) 40 MG capsule, Take 1 capsule (40 mg total) by mouth daily, Disp: 90 capsule, Rfl: 1    traMADol (ULTRAM) 50 mg tablet, Take 50 mg by mouth every 6 (six) hours as needed for moderate pain, Disp: , Rfl:     XARELTO 20 MG tablet, Take 1 tablet (20 mg total) by mouth daily with breakfast, Disp: 30 tablet, Rfl: 5    aspirin (ECOTRIN) 325 mg EC tablet, Take 325 mg by mouth daily  , Disp: , Rfl:     meclizine (ANTIVERT) 25 mg tablet, Take 2 tablets (50 mg total) by mouth every 8 (eight) hours as needed for dizziness, Disp: 270 tablet, Rfl: 3    rosuvastatin (CRESTOR) 40 MG tablet, Take 1 tablet (40 mg total) by mouth daily, Disp: 90 tablet, Rfl: 3    Review of Systems   Constitutional: Negative for activity change, appetite change, chills, diaphoresis, fatigue, fever and unexpected weight change  Generally feels well   HENT: Negative for congestion, ear pain, hearing loss, mouth sores, nosebleeds, postnasal drip, sinus pressure, sinus pain, sore throat and trouble swallowing  Eyes: Negative for pain, discharge and visual disturbance  Respiratory: Negative for apnea, cough, chest tightness, shortness of breath and wheezing  Cardiovascular: Negative for chest pain, palpitations and leg swelling  Gastrointestinal: Negative for abdominal pain, anal bleeding, blood in stool, constipation, diarrhea, nausea and vomiting  Endocrine: Negative for polydipsia and polyphagia  Genitourinary: Negative for decreased urine volume, dysuria, flank pain, frequency, hematuria and urgency  Musculoskeletal: Positive for arthralgias  Negative for back pain, gait problem, joint swelling and myalgias  Skin: Negative for rash and wound  Allergic/Immunologic: Negative for environmental allergies and food allergies  Neurological: Negative for dizziness, tremors, seizures, syncope, speech difficulty, light-headedness, numbness and headaches  Hematological: Negative for adenopathy  Does not bruise/bleed easily  Psychiatric/Behavioral: Negative for agitation, confusion, hallucinations, sleep disturbance and suicidal ideas   The patient is not nervous/anxious  Objective:  /82 (BP Location: Left arm, Patient Position: Sitting, Cuff Size: Standard)   Pulse 63   Ht 5' 8" (1 727 m)   Wt 93 kg (205 lb)   SpO2 98%   BMI 31 17 kg/m²      Physical Exam   Constitutional: He appears well-developed and well-nourished  No distress  Blood pressure is 130/70  Rhythm is irregularly irregular  Rate is 70   HENT:   Head: Normocephalic  Right Ear: External ear normal    Left Ear: External ear normal    Nose: Nose normal    Mouth/Throat: Oropharynx is clear and moist  No oropharyngeal exudate  Eyes: Pupils are equal, round, and reactive to light  Conjunctivae and EOM are normal  Right eye exhibits no discharge  Left eye exhibits no discharge  Neck: Normal range of motion  Neck supple  No thyromegaly present  Cardiovascular: Normal rate and intact distal pulses  Exam reveals no gallop and no friction rub  No murmur heard  Rhythm irregular   Pulmonary/Chest: Effort normal and breath sounds normal  No respiratory distress  He has no wheezes  He has no rales  Abdominal: Soft  Bowel sounds are normal  He exhibits no distension and no mass  There is no tenderness  There is no rebound and no guarding  Musculoskeletal: He exhibits no edema, tenderness or deformity  Left shoulder discomfort   Lymphadenopathy:     He has no cervical adenopathy  Neurological: He is alert  He has normal reflexes  He displays normal reflexes  No cranial nerve deficit  Coordination normal    Skin: Skin is warm and dry  No rash noted  No erythema  Psychiatric: He has a normal mood and affect  His behavior is normal  Judgment and thought content normal    Nursing note and vitals reviewed          Recent Results (from the past 1008 hour(s))    Diabetes Eye Exam    Collection Time: 10/25/19  2:53 PM   Result Value Ref Range    Right Eye Diabetic Retinopathy None     Left Eye Diabetic Retinopathy None    CBC and differential    Collection Time: 10/29/19 10:14 AM Result Value Ref Range    WBC 6 13 4 31 - 10 16 Thousand/uL    RBC 4 84 3 88 - 5 62 Million/uL    Hemoglobin 13 1 12 0 - 17 0 g/dL    Hematocrit 42 2 36 5 - 49 3 %    MCV 87 82 - 98 fL    MCH 27 1 26 8 - 34 3 pg    MCHC 31 0 (L) 31 4 - 37 4 g/dL    RDW 15 0 11 6 - 15 1 %    MPV 10 8 8 9 - 12 7 fL    Platelets 210 435 - 306 Thousands/uL    nRBC 0 /100 WBCs    Neutrophils Relative 56 43 - 75 %    Immat GRANS % 0 0 - 2 %    Lymphocytes Relative 31 14 - 44 %    Monocytes Relative 8 4 - 12 %    Eosinophils Relative 3 0 - 6 %    Basophils Relative 2 (H) 0 - 1 %    Neutrophils Absolute 3 46 1 85 - 7 62 Thousands/µL    Immature Grans Absolute 0 01 0 00 - 0 20 Thousand/uL    Lymphocytes Absolute 1 89 0 60 - 4 47 Thousands/µL    Monocytes Absolute 0 48 0 17 - 1 22 Thousand/µL    Eosinophils Absolute 0 19 0 00 - 0 61 Thousand/µL    Basophils Absolute 0 10 0 00 - 0 10 Thousands/µL   Comprehensive metabolic panel    Collection Time: 10/29/19 10:14 AM   Result Value Ref Range    Sodium 143 136 - 145 mmol/L    Potassium 4 1 3 5 - 5 3 mmol/L    Chloride 110 (H) 100 - 108 mmol/L    CO2 27 21 - 32 mmol/L    ANION GAP 6 4 - 13 mmol/L    BUN 19 5 - 25 mg/dL    Creatinine 1 13 0 60 - 1 30 mg/dL    Glucose, Fasting 92 65 - 99 mg/dL    Calcium 9 1 8 3 - 10 1 mg/dL    AST 21 5 - 45 U/L    ALT 22 12 - 78 U/L    Alkaline Phosphatase 45 (L) 46 - 116 U/L    Total Protein 7 2 6 4 - 8 2 g/dL    Albumin 4 0 3 5 - 5 0 g/dL    Total Bilirubin 0 43 0 20 - 1 00 mg/dL    eGFR 68 ml/min/1 73sq m   HEMOGLOBIN A1C W/ EAG ESTIMATION    Collection Time: 10/29/19 10:14 AM   Result Value Ref Range    Hemoglobin A1C 6 5 (H) 4 2 - 6 3 %     mg/dl   Lipid panel    Collection Time: 10/29/19 10:14 AM   Result Value Ref Range    Cholesterol 181 50 - 200 mg/dL    Triglycerides 110 <=150 mg/dL    HDL, Direct 45 >=40 mg/dL    LDL Calculated 114 (H) 0 - 100 mg/dL    Non-HDL-Chol (CHOL-HDL) 136 mg/dl   PSA Total, Diagnostic    Collection Time: 10/29/19 10:14 AM   Result Value Ref Range    PSA, Diagnostic 0 5 0 0 - 4 0 ng/mL

## 2020-02-13 ENCOUNTER — OFFICE VISIT (OUTPATIENT)
Dept: DERMATOLOGY | Facility: CLINIC | Age: 65
End: 2020-02-13
Payer: COMMERCIAL

## 2020-02-13 DIAGNOSIS — L98.9 SKIN LESION: ICD-10-CM

## 2020-02-13 DIAGNOSIS — L73.8 SEBACEOUS HYPERPLASIA: Primary | ICD-10-CM

## 2020-02-13 DIAGNOSIS — Z13.89 SCREENING FOR SKIN CONDITION: ICD-10-CM

## 2020-02-13 DIAGNOSIS — L82.1 SEBORRHEIC KERATOSIS: ICD-10-CM

## 2020-02-13 DIAGNOSIS — D22.9 NEVUS: ICD-10-CM

## 2020-02-13 PROCEDURE — 1036F TOBACCO NON-USER: CPT | Performed by: DERMATOLOGY

## 2020-02-13 PROCEDURE — 3079F DIAST BP 80-89 MM HG: CPT | Performed by: DERMATOLOGY

## 2020-02-13 PROCEDURE — 99203 OFFICE O/P NEW LOW 30 MIN: CPT | Performed by: DERMATOLOGY

## 2020-02-13 PROCEDURE — 4040F PNEUMOC VAC/ADMIN/RCVD: CPT | Performed by: DERMATOLOGY

## 2020-02-13 PROCEDURE — 2022F DILAT RTA XM EVC RTNOPTHY: CPT | Performed by: DERMATOLOGY

## 2020-02-13 PROCEDURE — 3075F SYST BP GE 130 - 139MM HG: CPT | Performed by: DERMATOLOGY

## 2020-02-13 NOTE — PROGRESS NOTES
[facility]  [address] [place] [address]  Nicky Brenner [place] [address]  Ingrid Carter  [phone]     MRN: [id number] : 1955  Encounter: [id number]  Patient Information: Kaylin Knowles  Chief complaint:  Skin lesions on cheek and overall checkup    History of present illness:  42-year-old male presents for overall skin check concerned regarding lesion on his left cheek reports that this biopsy previously and was not a problem a but it continues to grow also concerned regarding several other growths on his skin  Past Medical History:   Diagnosis Date    Atrial fibrillation ([facility] )     Candidiasis, cutaneous     Colon, diverticulosis     Depression     Diabetes mellitus ([facility] )     Diverticulosis     GERD (gastroesophageal reflux disease)     Hyperlipidemia     Hypertension     Internal hemorrhoids     Irregular heart beat     aFIB    Pneumonia     Shortness of breath     Sleep apnea     uses cpap    Vertigo of central origin      Past Surgical History:   Procedure Laterality Date    COLONOSCOPY      EGD AND COLONOSCOPY      IA COLONOSCOPY FLX DX W/COLLJ SPEC WHEN PFRMD N/A 2018    Procedure: COLONOSCOPY;  Surgeon: Berenice Babcock MD;  Location: [facility]; Service: Gastroenterology    IA ESOPHAGOGASTRODUODENOSCOPY TRANSORAL DIAGNOSTIC N/A 10/10/2017    Procedure: ESOPHAGOGASTRODUODENOSCOPY (EGD); Surgeon: Berenice Babcock MD;  Location: [facility];   Service: Gastroenterology    TONSILLECTOMY       Social History   Social History     Substance and Sexual Activity   Alcohol Use Yes    Frequency: Monthly or less    Drinks per session: 3 or 4    Comment: RARE     Social History     Substance and Sexual Activity   Drug Use No     Social History     Tobacco Use   Smoking Status Former Smoker    Packs/day: 2 00    Types: Cigarettes    Last attempt to quit: 10/10/1983    Years since quittin 3   Smokeless Tobacco Never Used   Tobacco Comment Needs ergo, potassium supplementation, MVI with iron daily. As per allscripts: Never a smoker     Family History   Problem Relation Age of Onset    Other Mother         CABG,Hepatic Disorder    Coronary artery disease Mother     Other Father         CABG    Diabetes Father     Cancer Family         Gastrointestinal     Meds/Allergies   No Known Allergies    Meds:  Prior to Admission medications    Medication Sig Start Date End Date Taking?  Authorizing Provider   ALPRAZolam Dimple Brody) 0 25 mg tablet Take 1 tablet (0 25 mg total) by mouth daily at bedtime as needed for anxiety 2/25/19  Yes Alberto Galvan DO   diltiazem (CARDIZEM) 120 MG tablet Take 1 tablet (120 mg total) by mouth daily 7/2/19  Yes Alberto Galvan DO   escitalopram (LEXAPRO) 10 mg tablet take 1 tablet by mouth once daily 4/23/19  Yes Alberto Galvan DO   fenofibrate (TRICOR) 145 mg tablet Take 1 tablet (145 mg total) by mouth daily 2/25/19  Yes Alberto Galvan DO   fexofenadine (ALLEGRA) 180 MG tablet Take 1 tablet (180 mg total) by mouth daily  Patient taking differently: Take 180 mg by mouth as needed   4/9/18  Yes Alberto Galvan DO   flecainide (TAMBOCOR) 100 mg tablet Take 100 mg by mouth 2 (two) times a day   Yes Historical Provider, MD   meclizine (ANTIVERT) 25 mg tablet Take 2 tablets (50 mg total) by mouth every 8 (eight) hours as needed for dizziness 11/12/19  Yes Alberto Galvan DO   metaxalone (SKELAXIN) 800 mg tablet Take 1 tablet (800 mg total) by mouth as needed for muscle spasms 11/12/19  Yes Alberto Galvan DO   metFORMIN (GLUCOPHAGE) 500 mg tablet Take 1 tablet (500 mg total) by mouth 2 (two) times a day with meals 11/12/19  Yes Alberto Galvan DO   omeprazole (PriLOSEC) 40 MG capsule Take 1 capsule (40 mg total) by mouth daily 2/25/19  Yes Alberto Galvan DO   rosuvastatin (CRESTOR) 40 MG tablet Take 1 tablet (40 mg total) by mouth daily 11/12/19  Yes Alberto Galvan DO   traMADol (ULTRAM) 50 mg tablet Take 50 mg by mouth every 6 (six) hours as needed for moderate pain   Yes Historical Provider, MD   XARELTO 20 MG tablet Take 1 tablet (20 mg total) by mouth daily with breakfast 7/2/19  Yes Garry Clock, DO   aspirin (ECOTRIN) 325 mg EC tablet Take 325 mg by mouth daily      Historical Provider, MD       Subjective:     Review of Systems:    General: negative for - chills, fatigue, fever,  weight gain or weight loss  Psychological: negative for - anxiety, behavioral disorder, concentration difficulties, decreased libido, depression, irritability, memory difficulties, mood swings, sleep disturbances or suicidal ideation  ENT: negative for - hearing difficulties , nasal congestion, nasal discharge, oral lesions, sinus pain, sneezing, sore throat  Allergy and Immunology: negative for - hives, insect bite sensitivity,  Hematological and Lymphatic: negative for - bleeding problems, blood clots,bruising, swollen lymph nodes  Endocrine: negative for - hair pattern changes, hot flashes, malaise/lethargy, mood swings, palpitations, polydipsia/polyuria, skin changes, temperature intolerance or unexpected weight change  Respiratory: negative for - cough, hemoptysis, orthopnea, shortness of breath, or wheezing  Cardiovascular: negative for - chest pain, dyspnea on exertion, edema,  Gastrointestinal: negative for - abdominal pain, nausea/vomiting  Genito-Urinary: negative for - dysuria, incontinence, irregular/heavy menses or urinary frequency/urgency  Musculoskeletal: negative for - gait disturbance, joint pain, joint stiffness, joint swelling, muscle pain, muscular weakness  Dermatological:  As in HPI  Neurological: negative for confusion, dizziness, headaches, impaired coordination/balance, memory loss, numbness/tingling, seizures, speech problems, tremors or weakness       Objective: There were no vitals taken for this visit      Physical Exam:    General Appearance:    Alert, cooperative, no distress   Head:    Normocephalic, without obvious abnormality, atraumatic           Skin:   A full skin exam was performed including scalp, head scalp, eyes, ears, nose, lips, neck, chest, axilla, abdomen, back, buttocks, bilateral upper extremities, bilateral lower extremities, hands, feet, fingers, toes, fingernails, and toenails yellowish papule noted on the left cheek measures 4 mm size with umbilicated center normal keratotic papules with greasy stuck on appearance normal pigmented lesion regular shape and color nothing else remarkable noted on complete exam     Assessment:     1  Sebaceous hyperplasia     2  Skin lesion  Ambulatory referral to Dermatology   3  Seborrheic keratosis     4  Nevus     5  Screening for skin condition           Plan:   Skin lesion on the face consistent with sebaceous hyperplasia no treatment needed unless is a cosmetic issue or continues to grow  Seborrheic Keratosis  Patient reasurred these are normal growths we acquire with age no treatment needed  Nevi reviewed the concept of ABCDE and ugly duckling nothing markedly atypical patient reassured  Screening for Dermatologic Disorders: Nothing else of concern noted on complete exam follow up in 2 year       Mk Vanegas MD  2/13/2020,8:56 AM    Portions of the record may have been created with voice recognition software   Occasional wrong word or "sound a like" substitutions may have occurred due to the inherent limitations of voice recognition software   Read the chart carefully and recognize, using context, where substitutions have occurred

## 2020-02-13 NOTE — PATIENT INSTRUCTIONS
Skin lesion on the face consistent with sebaceous hyperplasia no treatment needed unless is a cosmetic issue or continues to grow  Seborrheic Keratosis  Patient reasurred these are normal growths we acquire with age no treatment needed    Nevi reviewed the concept of ABCDE and ugly duckling nothing markedly atypical patient reassured  Screening for Dermatologic Disorders: Nothing else of concern noted on complete exam follow up in 2 year

## 2020-02-14 DIAGNOSIS — K21.9 GASTROESOPHAGEAL REFLUX DISEASE, ESOPHAGITIS PRESENCE NOT SPECIFIED: ICD-10-CM

## 2020-02-14 RX ORDER — OMEPRAZOLE 40 MG/1
CAPSULE, DELAYED RELEASE ORAL
Qty: 90 CAPSULE | Refills: 1 | Status: SHIPPED | OUTPATIENT
Start: 2020-02-14 | End: 2020-02-17 | Stop reason: SDUPTHER

## 2020-02-17 DIAGNOSIS — K21.9 GASTROESOPHAGEAL REFLUX DISEASE, ESOPHAGITIS PRESENCE NOT SPECIFIED: ICD-10-CM

## 2020-02-17 RX ORDER — OMEPRAZOLE 40 MG/1
40 CAPSULE, DELAYED RELEASE ORAL DAILY
Qty: 90 CAPSULE | Refills: 1 | Status: SHIPPED | OUTPATIENT
Start: 2020-02-17 | End: 2020-12-30 | Stop reason: SDUPTHER

## 2020-03-09 ENCOUNTER — APPOINTMENT (OUTPATIENT)
Dept: LAB | Facility: CLINIC | Age: 65
End: 2020-03-09
Payer: COMMERCIAL

## 2020-03-09 DIAGNOSIS — E11.9 CONTROLLED TYPE 2 DIABETES MELLITUS WITHOUT COMPLICATION, WITHOUT LONG-TERM CURRENT USE OF INSULIN (HCC): ICD-10-CM

## 2020-03-09 LAB
ALBUMIN SERPL BCP-MCNC: 3.9 G/DL (ref 3.5–5)
ALP SERPL-CCNC: 36 U/L (ref 46–116)
ALT SERPL W P-5'-P-CCNC: 26 U/L (ref 12–78)
ANION GAP SERPL CALCULATED.3IONS-SCNC: 6 MMOL/L (ref 4–13)
AST SERPL W P-5'-P-CCNC: 24 U/L (ref 5–45)
BASOPHILS # BLD AUTO: 0.07 THOUSANDS/ΜL (ref 0–0.1)
BASOPHILS NFR BLD AUTO: 1 % (ref 0–1)
BILIRUB SERPL-MCNC: 0.48 MG/DL (ref 0.2–1)
BUN SERPL-MCNC: 23 MG/DL (ref 5–25)
CALCIUM SERPL-MCNC: 8.9 MG/DL (ref 8.3–10.1)
CHLORIDE SERPL-SCNC: 110 MMOL/L (ref 100–108)
CHOLEST SERPL-MCNC: 137 MG/DL (ref 50–200)
CO2 SERPL-SCNC: 25 MMOL/L (ref 21–32)
CREAT SERPL-MCNC: 1.28 MG/DL (ref 0.6–1.3)
EOSINOPHIL # BLD AUTO: 0.14 THOUSAND/ΜL (ref 0–0.61)
EOSINOPHIL NFR BLD AUTO: 3 % (ref 0–6)
ERYTHROCYTE [DISTWIDTH] IN BLOOD BY AUTOMATED COUNT: 14.6 % (ref 11.6–15.1)
EST. AVERAGE GLUCOSE BLD GHB EST-MCNC: 137 MG/DL
GFR SERPL CREATININE-BSD FRML MDRD: 58 ML/MIN/1.73SQ M
GLUCOSE P FAST SERPL-MCNC: 112 MG/DL (ref 65–99)
HBA1C MFR BLD: 6.4 %
HCT VFR BLD AUTO: 41.6 % (ref 36.5–49.3)
HDLC SERPL-MCNC: 47 MG/DL
HGB BLD-MCNC: 13.3 G/DL (ref 12–17)
IMM GRANULOCYTES # BLD AUTO: 0.01 THOUSAND/UL (ref 0–0.2)
IMM GRANULOCYTES NFR BLD AUTO: 0 % (ref 0–2)
LDLC SERPL CALC-MCNC: 69 MG/DL (ref 0–100)
LYMPHOCYTES # BLD AUTO: 1.81 THOUSANDS/ΜL (ref 0.6–4.47)
LYMPHOCYTES NFR BLD AUTO: 34 % (ref 14–44)
MCH RBC QN AUTO: 28.3 PG (ref 26.8–34.3)
MCHC RBC AUTO-ENTMCNC: 32 G/DL (ref 31.4–37.4)
MCV RBC AUTO: 89 FL (ref 82–98)
MONOCYTES # BLD AUTO: 0.41 THOUSAND/ΜL (ref 0.17–1.22)
MONOCYTES NFR BLD AUTO: 8 % (ref 4–12)
NEUTROPHILS # BLD AUTO: 2.91 THOUSANDS/ΜL (ref 1.85–7.62)
NEUTS SEG NFR BLD AUTO: 54 % (ref 43–75)
NONHDLC SERPL-MCNC: 90 MG/DL
NRBC BLD AUTO-RTO: 0 /100 WBCS
PLATELET # BLD AUTO: 268 THOUSANDS/UL (ref 149–390)
PMV BLD AUTO: 11 FL (ref 8.9–12.7)
POTASSIUM SERPL-SCNC: 3.8 MMOL/L (ref 3.5–5.3)
PROT SERPL-MCNC: 6.8 G/DL (ref 6.4–8.2)
RBC # BLD AUTO: 4.7 MILLION/UL (ref 3.88–5.62)
SODIUM SERPL-SCNC: 141 MMOL/L (ref 136–145)
TRIGL SERPL-MCNC: 103 MG/DL
WBC # BLD AUTO: 5.35 THOUSAND/UL (ref 4.31–10.16)

## 2020-03-09 PROCEDURE — 85025 COMPLETE CBC W/AUTO DIFF WBC: CPT

## 2020-03-09 PROCEDURE — 83036 HEMOGLOBIN GLYCOSYLATED A1C: CPT

## 2020-03-09 PROCEDURE — 36415 COLL VENOUS BLD VENIPUNCTURE: CPT

## 2020-03-09 PROCEDURE — 80053 COMPREHEN METABOLIC PANEL: CPT

## 2020-03-09 PROCEDURE — 80061 LIPID PANEL: CPT

## 2020-04-24 DIAGNOSIS — F32.A DEPRESSION, UNSPECIFIED DEPRESSION TYPE: ICD-10-CM

## 2020-04-24 RX ORDER — ESCITALOPRAM OXALATE 10 MG/1
TABLET ORAL
Qty: 30 TABLET | Refills: 5 | Status: SHIPPED | OUTPATIENT
Start: 2020-04-24 | End: 2020-05-26 | Stop reason: SDUPTHER

## 2020-05-26 ENCOUNTER — TELEMEDICINE (OUTPATIENT)
Dept: INTERNAL MEDICINE CLINIC | Facility: CLINIC | Age: 65
End: 2020-05-26
Payer: COMMERCIAL

## 2020-05-26 ENCOUNTER — TELEPHONE (OUTPATIENT)
Dept: INTERNAL MEDICINE CLINIC | Facility: CLINIC | Age: 65
End: 2020-05-26

## 2020-05-26 ENCOUNTER — TELEPHONE (OUTPATIENT)
Dept: ADMINISTRATIVE | Facility: OTHER | Age: 65
End: 2020-05-26

## 2020-05-26 DIAGNOSIS — F41.9 ANXIETY AND DEPRESSION: ICD-10-CM

## 2020-05-26 DIAGNOSIS — E11.9 CONTROLLED TYPE 2 DIABETES MELLITUS WITHOUT COMPLICATION, WITHOUT LONG-TERM CURRENT USE OF INSULIN (HCC): Primary | ICD-10-CM

## 2020-05-26 DIAGNOSIS — F32.A ANXIETY AND DEPRESSION: ICD-10-CM

## 2020-05-26 DIAGNOSIS — I48.91 ATRIAL FIBRILLATION, UNSPECIFIED TYPE (HCC): ICD-10-CM

## 2020-05-26 DIAGNOSIS — E11.9 CONTROLLED TYPE 2 DIABETES MELLITUS WITHOUT COMPLICATION, WITHOUT LONG-TERM CURRENT USE OF INSULIN (HCC): ICD-10-CM

## 2020-05-26 DIAGNOSIS — I10 BENIGN ESSENTIAL HYPERTENSION: ICD-10-CM

## 2020-05-26 DIAGNOSIS — I48.11 LONGSTANDING PERSISTENT ATRIAL FIBRILLATION (HCC): ICD-10-CM

## 2020-05-26 DIAGNOSIS — E78.00 HIGH CHOLESTEROL: ICD-10-CM

## 2020-05-26 DIAGNOSIS — R42 VERTIGO: ICD-10-CM

## 2020-05-26 DIAGNOSIS — F32.A DEPRESSION, UNSPECIFIED DEPRESSION TYPE: ICD-10-CM

## 2020-05-26 PROBLEM — E78.2 MIXED HYPERLIPIDEMIA: Status: ACTIVE | Noted: 2020-05-26

## 2020-05-26 PROCEDURE — 99442 PR PHYS/QHP TELEPHONE EVALUATION 11-20 MIN: CPT | Performed by: INTERNAL MEDICINE

## 2020-05-26 RX ORDER — ESCITALOPRAM OXALATE 10 MG/1
10 TABLET ORAL DAILY
Qty: 90 TABLET | Refills: 5 | Status: SHIPPED | OUTPATIENT
Start: 2020-05-26 | End: 2020-12-30 | Stop reason: SDUPTHER

## 2020-05-26 RX ORDER — MECLIZINE HYDROCHLORIDE 25 MG/1
50 TABLET ORAL EVERY 8 HOURS PRN
Qty: 270 TABLET | Refills: 3 | Status: SHIPPED | OUTPATIENT
Start: 2020-05-26 | End: 2022-03-16

## 2020-05-26 RX ORDER — ROSUVASTATIN CALCIUM 40 MG/1
40 TABLET, COATED ORAL DAILY
Qty: 90 TABLET | Refills: 3 | Status: SHIPPED | OUTPATIENT
Start: 2020-05-26 | End: 2020-08-24 | Stop reason: SDUPTHER

## 2020-05-26 RX ORDER — DILTIAZEM HYDROCHLORIDE 120 MG/1
120 TABLET, FILM COATED ORAL DAILY
Qty: 90 TABLET | Refills: 3 | Status: SHIPPED | OUTPATIENT
Start: 2020-05-26 | End: 2020-12-30 | Stop reason: SDUPTHER

## 2020-05-26 RX ORDER — FENOFIBRATE 145 MG/1
145 TABLET, COATED ORAL DAILY
Qty: 90 TABLET | Refills: 3 | Status: SHIPPED | OUTPATIENT
Start: 2020-05-26 | End: 2020-08-24 | Stop reason: SDUPTHER

## 2020-05-26 RX ORDER — ALPRAZOLAM 0.25 MG/1
0.25 TABLET ORAL
Qty: 30 TABLET | Refills: 0 | Status: SHIPPED | OUTPATIENT
Start: 2020-05-26 | End: 2022-05-05

## 2020-08-10 ENCOUNTER — APPOINTMENT (OUTPATIENT)
Dept: LAB | Facility: CLINIC | Age: 65
End: 2020-08-10
Payer: COMMERCIAL

## 2020-08-10 DIAGNOSIS — E11.9 CONTROLLED TYPE 2 DIABETES MELLITUS WITHOUT COMPLICATION, WITHOUT LONG-TERM CURRENT USE OF INSULIN (HCC): ICD-10-CM

## 2020-08-10 LAB
ANION GAP SERPL CALCULATED.3IONS-SCNC: 5 MMOL/L (ref 4–13)
BASOPHILS # BLD AUTO: 0.07 THOUSANDS/ΜL (ref 0–0.1)
BASOPHILS NFR BLD AUTO: 1 % (ref 0–1)
BUN SERPL-MCNC: 21 MG/DL (ref 5–25)
CALCIUM SERPL-MCNC: 8.8 MG/DL (ref 8.3–10.1)
CHLORIDE SERPL-SCNC: 112 MMOL/L (ref 100–108)
CHOLEST SERPL-MCNC: 134 MG/DL (ref 50–200)
CO2 SERPL-SCNC: 27 MMOL/L (ref 21–32)
CREAT SERPL-MCNC: 1.15 MG/DL (ref 0.6–1.3)
CREAT UR-MCNC: 197 MG/DL
EOSINOPHIL # BLD AUTO: 0.17 THOUSAND/ΜL (ref 0–0.61)
EOSINOPHIL NFR BLD AUTO: 3 % (ref 0–6)
ERYTHROCYTE [DISTWIDTH] IN BLOOD BY AUTOMATED COUNT: 14.7 % (ref 11.6–15.1)
EST. AVERAGE GLUCOSE BLD GHB EST-MCNC: 134 MG/DL
GFR SERPL CREATININE-BSD FRML MDRD: 66 ML/MIN/1.73SQ M
GLUCOSE P FAST SERPL-MCNC: 97 MG/DL (ref 65–99)
HBA1C MFR BLD: 6.3 %
HCT VFR BLD AUTO: 39.5 % (ref 36.5–49.3)
HDLC SERPL-MCNC: 38 MG/DL
HGB BLD-MCNC: 12.2 G/DL (ref 12–17)
IMM GRANULOCYTES # BLD AUTO: 0.01 THOUSAND/UL (ref 0–0.2)
IMM GRANULOCYTES NFR BLD AUTO: 0 % (ref 0–2)
LDLC SERPL CALC-MCNC: 73 MG/DL (ref 0–100)
LYMPHOCYTES # BLD AUTO: 2.05 THOUSANDS/ΜL (ref 0.6–4.47)
LYMPHOCYTES NFR BLD AUTO: 34 % (ref 14–44)
MCH RBC QN AUTO: 27.9 PG (ref 26.8–34.3)
MCHC RBC AUTO-ENTMCNC: 30.9 G/DL (ref 31.4–37.4)
MCV RBC AUTO: 90 FL (ref 82–98)
MICROALBUMIN UR-MCNC: 7.4 MG/L (ref 0–20)
MICROALBUMIN/CREAT 24H UR: 4 MG/G CREATININE (ref 0–30)
MONOCYTES # BLD AUTO: 0.49 THOUSAND/ΜL (ref 0.17–1.22)
MONOCYTES NFR BLD AUTO: 8 % (ref 4–12)
NEUTROPHILS # BLD AUTO: 3.18 THOUSANDS/ΜL (ref 1.85–7.62)
NEUTS SEG NFR BLD AUTO: 54 % (ref 43–75)
NONHDLC SERPL-MCNC: 96 MG/DL
NRBC BLD AUTO-RTO: 0 /100 WBCS
PLATELET # BLD AUTO: 288 THOUSANDS/UL (ref 149–390)
PMV BLD AUTO: 10.7 FL (ref 8.9–12.7)
POTASSIUM SERPL-SCNC: 4.2 MMOL/L (ref 3.5–5.3)
RBC # BLD AUTO: 4.38 MILLION/UL (ref 3.88–5.62)
SODIUM SERPL-SCNC: 144 MMOL/L (ref 136–145)
TRIGL SERPL-MCNC: 113 MG/DL
WBC # BLD AUTO: 5.97 THOUSAND/UL (ref 4.31–10.16)

## 2020-08-10 PROCEDURE — 80061 LIPID PANEL: CPT

## 2020-08-10 PROCEDURE — 82043 UR ALBUMIN QUANTITATIVE: CPT | Performed by: INTERNAL MEDICINE

## 2020-08-10 PROCEDURE — 83036 HEMOGLOBIN GLYCOSYLATED A1C: CPT

## 2020-08-10 PROCEDURE — 80048 BASIC METABOLIC PNL TOTAL CA: CPT

## 2020-08-10 PROCEDURE — 3044F HG A1C LEVEL LT 7.0%: CPT | Performed by: INTERNAL MEDICINE

## 2020-08-10 PROCEDURE — 36415 COLL VENOUS BLD VENIPUNCTURE: CPT

## 2020-08-10 PROCEDURE — 3061F NEG MICROALBUMINURIA REV: CPT | Performed by: INTERNAL MEDICINE

## 2020-08-10 PROCEDURE — 82570 ASSAY OF URINE CREATININE: CPT | Performed by: INTERNAL MEDICINE

## 2020-08-10 PROCEDURE — 85025 COMPLETE CBC W/AUTO DIFF WBC: CPT

## 2020-08-24 ENCOUNTER — OFFICE VISIT (OUTPATIENT)
Dept: INTERNAL MEDICINE CLINIC | Facility: CLINIC | Age: 65
End: 2020-08-24
Payer: COMMERCIAL

## 2020-08-24 ENCOUNTER — TELEPHONE (OUTPATIENT)
Dept: ADMINISTRATIVE | Facility: OTHER | Age: 65
End: 2020-08-24

## 2020-08-24 VITALS
BODY MASS INDEX: 32.3 KG/M2 | HEART RATE: 68 BPM | TEMPERATURE: 97.8 F | SYSTOLIC BLOOD PRESSURE: 128 MMHG | WEIGHT: 212.4 LBS | DIASTOLIC BLOOD PRESSURE: 82 MMHG | OXYGEN SATURATION: 97 %

## 2020-08-24 DIAGNOSIS — I80.299 PHLEBITIS AND THROMBOPHLEBITIS OF OTHER DEEP VESSELS OF UNSPECIFIED LOWER EXTREMITY (HCC): ICD-10-CM

## 2020-08-24 DIAGNOSIS — E78.00 HIGH CHOLESTEROL: ICD-10-CM

## 2020-08-24 DIAGNOSIS — F32.A ANXIETY AND DEPRESSION: ICD-10-CM

## 2020-08-24 DIAGNOSIS — E11.9 CONTROLLED TYPE 2 DIABETES MELLITUS WITHOUT COMPLICATION, WITHOUT LONG-TERM CURRENT USE OF INSULIN (HCC): Primary | ICD-10-CM

## 2020-08-24 DIAGNOSIS — F32.A DEPRESSION, UNSPECIFIED DEPRESSION TYPE: ICD-10-CM

## 2020-08-24 DIAGNOSIS — E78.2 MIXED HYPERLIPIDEMIA: ICD-10-CM

## 2020-08-24 DIAGNOSIS — I48.91 ATRIAL FIBRILLATION, UNSPECIFIED TYPE (HCC): ICD-10-CM

## 2020-08-24 DIAGNOSIS — Z79.01 CHRONIC ANTICOAGULATION: ICD-10-CM

## 2020-08-24 DIAGNOSIS — F41.9 ANXIETY AND DEPRESSION: ICD-10-CM

## 2020-08-24 DIAGNOSIS — I10 BENIGN ESSENTIAL HYPERTENSION: ICD-10-CM

## 2020-08-24 PROCEDURE — 3079F DIAST BP 80-89 MM HG: CPT | Performed by: INTERNAL MEDICINE

## 2020-08-24 PROCEDURE — 3074F SYST BP LT 130 MM HG: CPT | Performed by: INTERNAL MEDICINE

## 2020-08-24 PROCEDURE — 1036F TOBACCO NON-USER: CPT | Performed by: INTERNAL MEDICINE

## 2020-08-24 PROCEDURE — 3044F HG A1C LEVEL LT 7.0%: CPT | Performed by: INTERNAL MEDICINE

## 2020-08-24 PROCEDURE — 4040F PNEUMOC VAC/ADMIN/RCVD: CPT | Performed by: INTERNAL MEDICINE

## 2020-08-24 PROCEDURE — 2022F DILAT RTA XM EVC RTNOPTHY: CPT | Performed by: INTERNAL MEDICINE

## 2020-08-24 PROCEDURE — 99214 OFFICE O/P EST MOD 30 MIN: CPT | Performed by: INTERNAL MEDICINE

## 2020-08-24 RX ORDER — FENOFIBRATE 145 MG/1
145 TABLET, COATED ORAL DAILY
Qty: 90 TABLET | Refills: 3 | Status: SHIPPED | OUTPATIENT
Start: 2020-08-24 | End: 2020-12-30 | Stop reason: SDUPTHER

## 2020-08-24 RX ORDER — ROSUVASTATIN CALCIUM 40 MG/1
40 TABLET, COATED ORAL DAILY
Qty: 90 TABLET | Refills: 3 | Status: SHIPPED | OUTPATIENT
Start: 2020-08-24 | End: 2020-12-30 | Stop reason: SDUPTHER

## 2020-08-24 NOTE — LETTER
Diabetic Foot Exam Form    Date Requested: 20  Patient: Pacheco Dorman  Patient : 1955   Referring Provider: Trisha Ojeda DO    Diabetic Foot Exam Performed with shoes and socks removed        Yes         No     Date of Diabetic Foot Exam ______________________________  Risk Score ____________________________________________    Left Foot       Visual Inspection         Monofilament Testing Sensory Exam        Pedal Pulses         Additional Comments         Right Foot      Visual Inspection         Monofilament Testing Sensory Exam       Pedal Pulses         Additional Comments         Comments __________________________________________________________    Practice Providing Exam ______________________________________________    Exam Performed By (print name) _______________________________________      Provider Signature ___________________________________________________      These reports are needed for  compliance    Please fax this completed form and a copy of the Diabetic Foot Exam report to our office located at [place] [address] as soon as possible to [phone] narinder Rosario: Phone [phone]    We thank you for your assistance in treating our mutual patient

## 2020-08-24 NOTE — LETTER
Diabetic Foot Exam Form    Date Requested: 20  Patient: Rosy Andrew  Patient : 1955   Referring Provider: Sharon Metzger DO    2nd Request    Diabetic Foot Exam Performed with shoes and socks removed        Yes         No     Date of Diabetic Foot Exam ______________________________  Risk Score ____________________________________________    Left Foot       Visual Inspection         Monofilament Testing Sensory Exam        Pedal Pulses         Additional Comments         Right Foot      Visual Inspection         Monofilament Testing Sensory Exam       Pedal Pulses         Additional Comments         Comments __________________________________________________________    Practice Providing Exam ______________________________________________    Exam Performed By (print name) _______________________________________      Provider Signature ___________________________________________________      These reports are needed for  compliance    Please fax this completed form and a copy of the Diabetic Foot Exam report to our office located at [place] [address] as soon as possible to [phone] narinder Garcia: Phone [phone]    We thank you for your assistance in treating our mutual patient

## 2020-08-25 NOTE — PROGRESS NOTES
Assessment/Plan:  Labs reviewed  LDL cholesterol 73  Creatinine stable at 1 15  A1c stable at 6 3  Will continue current regimen regarding his diabetes  Cardiac follow-up per his cardiologist   Remains anticoagulated  Otherwise up-to-date on health maintenance issues  Will be seen in 4 months with a new practitioner  1  Controlled type 2 diabetes mellitus without complication, without long-term current use of insulin (HCC)  metFORMIN (GLUCOPHAGE) 500 mg tablet   2  Benign essential hypertension     3  Anxiety and depression     4  Mixed hyperlipidemia     5  Chronic anticoagulation     6  High cholesterol  rosuvastatin (CRESTOR) 40 MG tablet    fenofibrate (TRICOR) 145 mg tablet   7  Atrial fibrillation, unspecified type (HCC)  fenofibrate (TRICOR) 145 mg tablet   8  Depression, unspecified depression type     9  Phlebitis and thrombophlebitis of other deep vessels of unspecified lower extremity (HCC)         No orders of the defined types were placed in this encounter  Subjective:  He comes in for follow-up  Generally feeling well  Multiple problems including atrial fibrillation diabetes hypertension hyperlipidemia  History of depression which is controlled on Lexapro  Patient ID: Portia Obrien is a 72 y o  male  HPI gets occasional palpitations  No exertional chest pain  Follows up closely with his cardiologist     Remains on Xarelto  No bleeding    The following portions of the patient's history were reviewed and updated as appropriate:   He has a past medical history of Atrial fibrillation ([address] ), Candidiasis, cutaneous, Colon, diverticulosis, Depression, Diabetes mellitus ([address] ), Diverticulosis, GERD (gastroesophageal reflux disease), Hyperlipidemia, Hypertension, Internal hemorrhoids, Irregular heart beat, Pneumonia, Shortness of breath, Sleep apnea, and Vertigo of central origin  ,  does not have any pertinent problems on file  ,   has a past surgical history that includes Tonsillectomy; EGD AND COLONOSCOPY; Colonoscopy; pr esophagogastroduodenoscopy transoral diagnostic (N/A, 10/10/2017); and pr colonoscopy flx dx w/collj spec when pfrmd (N/A, 8/29/2018)  ,  family history includes Cancer in his family; Coronary artery disease in his mother; Diabetes in his father; Other in his father and mother  ,   reports that he quit smoking about 36 years ago  His smoking use included cigarettes  He smoked 2 00 packs per day  He has never used smokeless tobacco  He reports current alcohol use  He reports that he does not use drugs  ,  has No Known Allergies       Current Outpatient Medications:     ALPRAZolam (XANAX) 0 25 mg tablet, Take 1 tablet (0 25 mg total) by mouth daily at bedtime as needed for anxiety, Disp: 30 tablet, Rfl: 0    diltiazem (CARDIZEM) 120 MG tablet, Take 1 tablet (120 mg total) by mouth daily, Disp: 90 tablet, Rfl: 3    escitalopram (LEXAPRO) 10 mg tablet, Take 1 tablet (10 mg total) by mouth daily, Disp: 90 tablet, Rfl: 5    fenofibrate (TRICOR) 145 mg tablet, Take 1 tablet (145 mg total) by mouth daily, Disp: 90 tablet, Rfl: 3    fexofenadine (ALLEGRA) 180 MG tablet, Take 1 tablet (180 mg total) by mouth daily (Patient taking differently: Take 180 mg by mouth as needed  ), Disp: 30 tablet, Rfl: 5    flecainide (TAMBOCOR) 100 mg tablet, Take 100 mg by mouth 2 (two) times a day, Disp: , Rfl:     meclizine (ANTIVERT) 25 mg tablet, Take 2 tablets (50 mg total) by mouth every 8 (eight) hours as needed for dizziness, Disp: 270 tablet, Rfl: 3    metaxalone (SKELAXIN) 800 mg tablet, Take 1 tablet (800 mg total) by mouth as needed for muscle spasms, Disp: 90 tablet, Rfl: 3    metFORMIN (GLUCOPHAGE) 500 mg tablet, Take 1 tablet (500 mg total) by mouth 2 (two) times a day with meals, Disp: 180 tablet, Rfl: 3    omeprazole (PriLOSEC) 40 MG capsule, Take 1 capsule (40 mg total) by mouth daily, Disp: 90 capsule, Rfl: 1    rosuvastatin (CRESTOR) 40 MG tablet, Take 1 tablet (40 mg total) by mouth daily, Disp: 90 tablet, Rfl: 3    traMADol (ULTRAM) 50 mg tablet, Take 50 mg by mouth every 6 (six) hours as needed for moderate pain, Disp: , Rfl:     XARELTO 20 MG tablet, Take 1 tablet (20 mg total) by mouth daily with breakfast, Disp: 30 tablet, Rfl: 5    Review of Systems   Constitutional: Negative for activity change, appetite change, chills, diaphoresis, fatigue, fever and unexpected weight change  HENT: Negative for congestion, ear pain, hearing loss, mouth sores, nosebleeds, postnasal drip, sinus pressure, sinus pain, sore throat and trouble swallowing  Eyes: Negative for pain, discharge and visual disturbance  Respiratory: Negative for apnea, cough, chest tightness, shortness of breath and wheezing  Cardiovascular: Negative for chest pain, palpitations and leg swelling  He gets occasional palpitations  Nothing sustained  No exertional chest pain  Gastrointestinal: Negative for abdominal pain, anal bleeding, blood in stool, constipation, diarrhea, nausea and vomiting  Up-to-date on colonoscopies  Endocrine: Negative for polydipsia and polyphagia  Has type 2 diabetes  A1c 6 3  Genitourinary: Negative for decreased urine volume, dysuria, flank pain, frequency, hematuria and urgency  Musculoskeletal: Negative for arthralgias, back pain, gait problem, joint swelling and myalgias  Skin: Negative for rash and wound  Allergic/Immunologic: Negative for environmental allergies and food allergies  Neurological: Negative for dizziness, tremors, seizures, syncope, speech difficulty, light-headedness, numbness and headaches  Hematological: Negative for adenopathy  Does not bruise/bleed easily  Psychiatric/Behavioral: Negative for agitation, confusion, hallucinations, sleep disturbance and suicidal ideas  The patient is not nervous/anxious            Objective:  /82 (BP Location: Left arm, Patient Position: Sitting, Cuff Size: Standard)   Pulse 68   Temp 97 8 °F (36 6 °C) (Temporal) Comment: NO NSAIDS  Wt 96 3 kg (212 lb 6 4 oz) Comment: w/ shoes denied off  SpO2 97%   BMI 32 30 kg/m²      Physical Exam  Vitals signs and nursing note reviewed  Constitutional:       General: He is not in acute distress  Appearance: He is well-developed  Comments: Blood pressure is 124/70  Rhythm is regular  Rate is 80  Respirations are 20  Weight is 212 lb  Temperature is 97 8°  HENT:      Head: Normocephalic  Right Ear: External ear normal       Left Ear: External ear normal       Nose: Nose normal       Mouth/Throat:      Pharynx: No oropharyngeal exudate  Eyes:      General:         Right eye: No discharge  Left eye: No discharge  Conjunctiva/sclera: Conjunctivae normal       Pupils: Pupils are equal, round, and reactive to light  Neck:      Musculoskeletal: Normal range of motion and neck supple  Thyroid: No thyromegaly  Cardiovascular:      Rate and Rhythm: Normal rate and regular rhythm  Heart sounds: Normal heart sounds  No murmur  No friction rub  No gallop  Comments: Rhythm is regular the present time with no ectopy  Pulmonary:      Effort: Pulmonary effort is normal  No respiratory distress  Breath sounds: Normal breath sounds  No wheezing or rales  Abdominal:      General: Bowel sounds are normal  There is no distension  Palpations: Abdomen is soft  There is no mass  Tenderness: There is no abdominal tenderness  There is no guarding or rebound  Musculoskeletal: Normal range of motion  General: No tenderness or deformity  Lymphadenopathy:      Cervical: No cervical adenopathy  Skin:     General: Skin is warm and dry  Findings: No erythema or rash  Neurological:      Mental Status: He is alert  Cranial Nerves: No cranial nerve deficit  Coordination: Coordination normal       Deep Tendon Reflexes: Reflexes are normal and symmetric  Reflexes normal    Psychiatric:         Behavior: Behavior normal          Thought Content: Thought content normal          Judgment: Judgment normal            Recent Results (from the past 1008 hour(s))   Microalbumin / creatinine urine ratio    Collection Time: 08/10/20  8:54 AM   Result Value Ref Range    Creatinine, Ur 197 0 mg/dL    Microalbum  ,U,Random 7 4 0 0 - 20 0 mg/L    Microalb Creat Ratio 4 0 - 30 mg/g creatinine   Basic metabolic panel    Collection Time: 08/10/20  8:54 AM   Result Value Ref Range    Sodium 144 136 - 145 mmol/L    Potassium 4 2 3 5 - 5 3 mmol/L    Chloride 112 (H) 100 - 108 mmol/L    CO2 27 21 - 32 mmol/L    ANION GAP 5 4 - 13 mmol/L    BUN 21 5 - 25 mg/dL    Creatinine 1 15 0 60 - 1 30 mg/dL    Glucose, Fasting 97 65 - 99 mg/dL    Calcium 8 8 8 3 - 10 1 mg/dL    eGFR 66 ml/min/1 73sq m   CBC and differential    Collection Time: 08/10/20  8:54 AM   Result Value Ref Range    WBC 5 97 4 31 - 10 16 Thousand/uL    RBC 4 38 3 88 - 5 62 Million/uL    Hemoglobin 12 2 12 0 - 17 0 g/dL    Hematocrit 39 5 36 5 - 49 3 %    MCV 90 82 - 98 fL    MCH 27 9 26 8 - 34 3 pg    MCHC 30 9 (L) 31 4 - 37 4 g/dL    RDW 14 7 11 6 - 15 1 %    MPV 10 7 8 9 - 12 7 fL    Platelets 917 877 - 473 Thousands/uL    nRBC 0 /100 WBCs    Neutrophils Relative 54 43 - 75 %    Immat GRANS % 0 0 - 2 %    Lymphocytes Relative 34 14 - 44 %    Monocytes Relative 8 4 - 12 %    Eosinophils Relative 3 0 - 6 %    Basophils Relative 1 0 - 1 %    Neutrophils Absolute 3 18 1 85 - 7 62 Thousands/µL    Immature Grans Absolute 0 01 0 00 - 0 20 Thousand/uL    Lymphocytes Absolute 2 05 0 60 - 4 47 Thousands/µL    Monocytes Absolute 0 49 0 17 - 1 22 Thousand/µL    Eosinophils Absolute 0 17 0 00 - 0 61 Thousand/µL    Basophils Absolute 0 07 0 00 - 0 10 Thousands/µL   HEMOGLOBIN A1C W/ EAG ESTIMATION    Collection Time: 08/10/20  8:54 AM   Result Value Ref Range    Hemoglobin A1C 6 3 (H) Normal 3 8-5 6%; PreDiabetic 5 7-6 4%;  Diabetic >=6 5%; Glycemic control for adults with diabetes <7 0% %     mg/dl   Lipid panel    Collection Time: 08/10/20  8:54 AM   Result Value Ref Range    Cholesterol 134 50 - 200 mg/dL    Triglycerides 113 <=150 mg/dL    HDL, Direct 38 (L) >=40 mg/dL    LDL Calculated 73 0 - 100 mg/dL    Non-HDL-Chol (CHOL-HDL) 96 mg/dl

## 2020-09-08 NOTE — TELEPHONE ENCOUNTER
----- Message from Yue Ruiz sent at 8/24/2020  8:29 AM EDT -----  Regarding: FOOT EXAM [facility] INTERNAL MED  08/24/20 8:29 AM    Hello, our patient Flower Pearl has had Diabetic Foot Exam completed/performed  Please assist in updating the patient chart by making an External outreach to [facility]     facility located in [address] The date of service is per pt this was about 6 months ago P# ;[phone]    Thank you,  JOCELYN Ruiz
As a final attempt, a third outreach has been made via telephone call  Please see Contacts section for details  This encounter will be closed and completed by end of day  Should we receive the requested information because of previous outreach attempts, the requested patient's chart will be updated appropriately       Thank you  Yue Ng
As a follow-up, a second attempt has been made for outreach via fax, please see Contacts section for details  A third and final attempt will be made within 5 business days      Thank you  Ree Paul, [address] [place] [address] [phone] Fax [phone]
Upon review of the In Basket request and the patient's chart, initial outreach has been made via fax, please see Contacts section for details  A second outreach attempt will be made within 5 business days      Thank you  Jennifer Husain, [address] [place] [address] [phone] Fax [phone]
2-4 times/mo

## 2020-10-23 LAB
LEFT EYE DIABETIC RETINOPATHY: NORMAL
RIGHT EYE DIABETIC RETINOPATHY: NORMAL

## 2020-10-26 ENCOUNTER — TELEPHONE (OUTPATIENT)
Dept: INTERNAL MEDICINE CLINIC | Facility: CLINIC | Age: 65
End: 2020-10-26

## 2020-11-06 ENCOUNTER — TELEPHONE (OUTPATIENT)
Dept: INTERNAL MEDICINE CLINIC | Facility: CLINIC | Age: 65
End: 2020-11-06

## 2020-12-27 ENCOUNTER — OFFICE VISIT (OUTPATIENT)
Dept: URGENT CARE | Facility: CLINIC | Age: 65
End: 2020-12-27
Payer: COMMERCIAL

## 2020-12-27 VITALS
TEMPERATURE: 97.8 F | RESPIRATION RATE: 18 BRPM | OXYGEN SATURATION: 99 % | HEART RATE: 60 BPM | SYSTOLIC BLOOD PRESSURE: 128 MMHG | DIASTOLIC BLOOD PRESSURE: 72 MMHG

## 2020-12-27 DIAGNOSIS — R05.9 COUGH: Primary | ICD-10-CM

## 2020-12-27 PROCEDURE — U0003 INFECTIOUS AGENT DETECTION BY NUCLEIC ACID (DNA OR RNA); SEVERE ACUTE RESPIRATORY SYNDROME CORONAVIRUS 2 (SARS-COV-2) (CORONAVIRUS DISEASE [COVID-19]), AMPLIFIED PROBE TECHNIQUE, MAKING USE OF HIGH THROUGHPUT TECHNOLOGIES AS DESCRIBED BY CMS-2020-01-R: HCPCS | Performed by: PHYSICIAN ASSISTANT

## 2020-12-27 PROCEDURE — 99213 OFFICE O/P EST LOW 20 MIN: CPT | Performed by: PHYSICIAN ASSISTANT

## 2020-12-27 PROCEDURE — S9083 URGENT CARE CENTER GLOBAL: HCPCS | Performed by: PHYSICIAN ASSISTANT

## 2020-12-27 RX ORDER — ALBUTEROL SULFATE 90 UG/1
2 AEROSOL, METERED RESPIRATORY (INHALATION) EVERY 6 HOURS PRN
Qty: 18 G | Refills: 0 | Status: SHIPPED | OUTPATIENT
Start: 2020-12-27 | End: 2022-05-05

## 2020-12-27 RX ORDER — DOXYCYCLINE 100 MG/1
100 TABLET ORAL 2 TIMES DAILY
Qty: 14 TABLET | Refills: 0 | Status: SHIPPED | OUTPATIENT
Start: 2020-12-27 | End: 2021-01-03

## 2020-12-29 LAB — SARS-COV-2 RNA SPEC QL NAA+PROBE: NOT DETECTED

## 2020-12-30 ENCOUNTER — OFFICE VISIT (OUTPATIENT)
Dept: FAMILY MEDICINE CLINIC | Facility: CLINIC | Age: 65
End: 2020-12-30
Payer: COMMERCIAL

## 2020-12-30 VITALS
HEIGHT: 68 IN | TEMPERATURE: 97.8 F | DIASTOLIC BLOOD PRESSURE: 82 MMHG | WEIGHT: 221 LBS | BODY MASS INDEX: 33.49 KG/M2 | SYSTOLIC BLOOD PRESSURE: 138 MMHG | RESPIRATION RATE: 18 BRPM | OXYGEN SATURATION: 96 % | HEART RATE: 70 BPM

## 2020-12-30 DIAGNOSIS — E78.00 HIGH CHOLESTEROL: ICD-10-CM

## 2020-12-30 DIAGNOSIS — K21.9 GASTROESOPHAGEAL REFLUX DISEASE: ICD-10-CM

## 2020-12-30 DIAGNOSIS — E78.2 MIXED HYPERLIPIDEMIA: ICD-10-CM

## 2020-12-30 DIAGNOSIS — Z12.5 SCREENING PSA (PROSTATE SPECIFIC ANTIGEN): ICD-10-CM

## 2020-12-30 DIAGNOSIS — I10 BENIGN ESSENTIAL HYPERTENSION: ICD-10-CM

## 2020-12-30 DIAGNOSIS — Z11.59 NEED FOR HEPATITIS C SCREENING TEST: ICD-10-CM

## 2020-12-30 DIAGNOSIS — Z11.4 ENCOUNTER FOR SCREENING FOR HUMAN IMMUNODEFICIENCY VIRUS (HIV): ICD-10-CM

## 2020-12-30 DIAGNOSIS — E11.9 CONTROLLED TYPE 2 DIABETES MELLITUS WITHOUT COMPLICATION, WITHOUT LONG-TERM CURRENT USE OF INSULIN (HCC): Primary | ICD-10-CM

## 2020-12-30 DIAGNOSIS — I48.11 LONGSTANDING PERSISTENT ATRIAL FIBRILLATION (HCC): ICD-10-CM

## 2020-12-30 DIAGNOSIS — F32.A DEPRESSION, UNSPECIFIED DEPRESSION TYPE: ICD-10-CM

## 2020-12-30 DIAGNOSIS — Z00.00 MEDICARE ANNUAL WELLNESS VISIT, INITIAL: ICD-10-CM

## 2020-12-30 DIAGNOSIS — E66.09 CLASS 1 OBESITY DUE TO EXCESS CALORIES WITH SERIOUS COMORBIDITY AND BODY MASS INDEX (BMI) OF 33.0 TO 33.9 IN ADULT: ICD-10-CM

## 2020-12-30 DIAGNOSIS — I48.91 ATRIAL FIBRILLATION, UNSPECIFIED TYPE (HCC): ICD-10-CM

## 2020-12-30 DIAGNOSIS — R42 CHRONIC VERTIGO: ICD-10-CM

## 2020-12-30 DIAGNOSIS — R42 VERTIGO: ICD-10-CM

## 2020-12-30 DIAGNOSIS — Z13.6 SCREENING FOR AAA (ABDOMINAL AORTIC ANEURYSM): ICD-10-CM

## 2020-12-30 DIAGNOSIS — F41.9 ANXIETY AND DEPRESSION: ICD-10-CM

## 2020-12-30 DIAGNOSIS — M62.08 DIASTASIS RECTI: ICD-10-CM

## 2020-12-30 DIAGNOSIS — F32.A ANXIETY AND DEPRESSION: ICD-10-CM

## 2020-12-30 DIAGNOSIS — K21.9 GASTROESOPHAGEAL REFLUX DISEASE, UNSPECIFIED WHETHER ESOPHAGITIS PRESENT: ICD-10-CM

## 2020-12-30 PROBLEM — I80.299 PHLEBITIS AND THROMBOPHLEBITIS OF OTHER DEEP VESSELS OF UNSPECIFIED LOWER EXTREMITY (HCC): Status: RESOLVED | Noted: 2020-08-24 | Resolved: 2020-12-30

## 2020-12-30 PROBLEM — E66.811 CLASS 1 OBESITY DUE TO EXCESS CALORIES WITH SERIOUS COMORBIDITY AND BODY MASS INDEX (BMI) OF 33.0 TO 33.9 IN ADULT: Status: ACTIVE | Noted: 2020-12-30

## 2020-12-30 PROBLEM — Z12.11 SPECIAL SCREENING FOR MALIGNANT NEOPLASMS, COLON: Status: RESOLVED | Noted: 2018-08-20 | Resolved: 2020-12-30

## 2020-12-30 PROCEDURE — G0402 INITIAL PREVENTIVE EXAM: HCPCS | Performed by: FAMILY MEDICINE

## 2020-12-30 PROCEDURE — 1036F TOBACCO NON-USER: CPT | Performed by: FAMILY MEDICINE

## 2020-12-30 PROCEDURE — 3725F SCREEN DEPRESSION PERFORMED: CPT | Performed by: FAMILY MEDICINE

## 2020-12-30 PROCEDURE — 3008F BODY MASS INDEX DOCD: CPT | Performed by: FAMILY MEDICINE

## 2020-12-30 PROCEDURE — 99204 OFFICE O/P NEW MOD 45 MIN: CPT | Performed by: FAMILY MEDICINE

## 2020-12-30 RX ORDER — ESCITALOPRAM OXALATE 10 MG/1
10 TABLET ORAL DAILY
Qty: 90 TABLET | Refills: 5 | Status: SHIPPED | OUTPATIENT
Start: 2020-12-30 | End: 2021-10-28 | Stop reason: SDUPTHER

## 2020-12-30 RX ORDER — OMEPRAZOLE 40 MG/1
40 CAPSULE, DELAYED RELEASE ORAL DAILY
Qty: 90 CAPSULE | Refills: 3 | Status: SHIPPED | OUTPATIENT
Start: 2020-12-30 | End: 2021-10-28 | Stop reason: SDUPTHER

## 2020-12-30 RX ORDER — DILTIAZEM HYDROCHLORIDE 120 MG/1
120 TABLET, FILM COATED ORAL DAILY
Qty: 90 TABLET | Refills: 3 | Status: SHIPPED | OUTPATIENT
Start: 2020-12-30 | End: 2021-10-28 | Stop reason: SDUPTHER

## 2020-12-30 RX ORDER — FENOFIBRATE 145 MG/1
145 TABLET, COATED ORAL DAILY
Qty: 90 TABLET | Refills: 3 | Status: SHIPPED | OUTPATIENT
Start: 2020-12-30 | End: 2021-10-28 | Stop reason: SDUPTHER

## 2020-12-30 RX ORDER — ROSUVASTATIN CALCIUM 40 MG/1
40 TABLET, COATED ORAL DAILY
Qty: 90 TABLET | Refills: 3 | Status: SHIPPED | OUTPATIENT
Start: 2020-12-30 | End: 2021-05-14

## 2020-12-30 RX ORDER — RIVAROXABAN 20 MG/1
20 TABLET, FILM COATED ORAL
Qty: 90 TABLET | Refills: 3 | Status: SHIPPED | OUTPATIENT
Start: 2020-12-30

## 2021-01-05 ENCOUNTER — OFFICE VISIT (OUTPATIENT)
Dept: GASTROENTEROLOGY | Facility: CLINIC | Age: 66
End: 2021-01-05
Payer: COMMERCIAL

## 2021-01-05 VITALS
DIASTOLIC BLOOD PRESSURE: 62 MMHG | HEART RATE: 68 BPM | BODY MASS INDEX: 33.19 KG/M2 | WEIGHT: 219 LBS | HEIGHT: 68 IN | SYSTOLIC BLOOD PRESSURE: 140 MMHG

## 2021-01-05 DIAGNOSIS — K21.9 GASTROESOPHAGEAL REFLUX DISEASE, UNSPECIFIED WHETHER ESOPHAGITIS PRESENT: ICD-10-CM

## 2021-01-05 DIAGNOSIS — R09.89 GLOBUS PHARYNGEUS: Primary | ICD-10-CM

## 2021-01-05 PROCEDURE — 1036F TOBACCO NON-USER: CPT | Performed by: INTERNAL MEDICINE

## 2021-01-05 PROCEDURE — 1160F RVW MEDS BY RX/DR IN RCRD: CPT | Performed by: INTERNAL MEDICINE

## 2021-01-05 PROCEDURE — 3008F BODY MASS INDEX DOCD: CPT | Performed by: INTERNAL MEDICINE

## 2021-01-05 PROCEDURE — 3078F DIAST BP <80 MM HG: CPT | Performed by: INTERNAL MEDICINE

## 2021-01-05 PROCEDURE — 3077F SYST BP >= 140 MM HG: CPT | Performed by: INTERNAL MEDICINE

## 2021-01-05 PROCEDURE — 99214 OFFICE O/P EST MOD 30 MIN: CPT | Performed by: INTERNAL MEDICINE

## 2021-01-05 NOTE — PROGRESS NOTES
[facility]      Chief Complaint:  Clearing the throat    HPI:  Kelby Brewer is a 72 y o   male who presents with several months of constantly clearing his throat  He feels there is something at the base of his throat that he continually needs to try to clear  He is having a lot of congestion issues and a lot of postnasal drip  Patient had an EGD a couple of years ago which was completely normal   He is on omeprazole 20 mg p o  Q a m  1/2 hour a c  Breakfast and takes it faithfully  He sleeps with the head of the bed elevated  He does not eat near bedtime  He has no GERD symptomatology at this point  This present issue has been going on during the entire fall and winter  He has not seen ENT yet  Patient's liver functions and CBC are normal   Hemoglobin A1c is 6 3  He has no early satiety  He has no other issues at the present time         Review of Systems:   Constitutional: No fever or chills, feels well, no tiredness, no recent weight gain or weight loss  HENT: No complaints of earache, no hearing loss, no nosebleeds, no nasal discharge, no sore throat, no hoarseness  Eyes: No complaints of eye pain, no red eyes, no discharge from eyes, no itchy eyes  Cardiovascular: No complaints of slow heart rate, no fast heart rate, no chest pain, no palpitations, no leg claudication, no lower extremity edema  Respiratory: No complaints of shortness of breath, no wheezing, no cough, no SOB on exertion, no orthopnea  Gastrointestinal: As noted in HPI  Genitourinary: No complaints of dysuria, no incontinence, no hesitancy, no nocturia  Musculoskeletal: No complaints of arthralgia, no myalgias, no joint swelling or stiffness, no limb pain or swelling  Neurological: No complaints of headache, no confusion, no convulsions, no numbness or tingling, no dizziness or fainting, no limb weakness, no difficulty walking      Skin: No complaints of skin rash or skin lesions, no itching, no skin wound, no dry skin  Hematological/Lymphatic: No complaints of swollen glands, does not bleed easy  Allergic/Immunologic: No immunocompromised state  Endocrine:  No complaints of polyuria, no polydipsia  Psychiatric/Behavioral: is not suicidal, no sleep disturbances, no anxiety or depression, no change in personality, no emotional problems  Historical Information   Past Medical History:   Diagnosis Date    Arthritis     Atrial fibrillation (HCC)     Candidiasis, cutaneous     Colon, diverticulosis     Depression     Diabetes mellitus (HCC)     Diverticulosis     GERD (gastroesophageal reflux disease)     Hyperlipidemia     Hypertension     Internal hemorrhoids     Irregular heart beat     aFIB    Pneumonia     Shortness of breath     Sleep apnea     uses cpap    Vertigo of central origin      Past Surgical History:   Procedure Laterality Date    CARDIAC ELECTROPHYSIOLOGY MAPPING AND ABLATION      COLONOSCOPY      EGD AND COLONOSCOPY      [place] COLONOSCOPY FLX DX W/COLLJ SPEC WHEN PFRMD N/A 2018    Procedure: COLONOSCOPY;  Surgeon: George Collins MD;  Location: [facility]; Service: Gastroenterology    [place] ESOPHAGOGASTRODUODENOSCOPY TRANSORAL DIAGNOSTIC N/A 10/10/2017    Procedure: ESOPHAGOGASTRODUODENOSCOPY (EGD); Surgeon: George Collins MD;  Location: [facility];   Service: Gastroenterology    TONSILLECTOMY       Social History   Social History     Substance and Sexual Activity   Alcohol Use Yes    Frequency: Monthly or less    Drinks per session: 3 or 4    Binge frequency: Never    Comment: RARE     Social History     Substance and Sexual Activity   Drug Use No     Social History     Tobacco Use   Smoking Status Former Smoker    Packs/day: 2 00    Types: Cigarettes    Quit date: 10/10/1983    Years since quittin 2   Smokeless Tobacco Never Used   Tobacco Comment    As per allscripts: Never a smoker     Family History   Problem Relation Age of Onset    Other Mother         CABG,Hepatic Disorder    Coronary artery disease Mother     Other Father         CABG    Diabetes Father     Cancer Family         Gastrointestinal         Current Medications: has a current medication list which includes the following prescription(s): albuterol, alprazolam, diltiazem, escitalopram, fenofibrate, flecainide, meclizine, metaxalone, metformin, omeprazole, rosuvastatin, tramadol, and xarelto  Vital Signs: /62   Pulse 68   Ht 5' 8" (1 727 m)   Wt 99 3 kg (219 lb)   BMI 33 30 kg/m²       Physical Exam:   Constitutional  General Appearance: No acute distress, well appearing and well nourished  Head  Normocephalic  Eyes  Conjunctivae and lids: No swelling, erythema, or discharge  Pupils and irises: Equal, round and reactive to light  Ears, Nose, Mouth, and Throat  External inspection of ears and nose: Normal  Nasal mucosa, septum and turbinates: Normal without edema or erythema/   Oropharynx: Normal with no erythema, edema, exudate or lesions  Neck  Normal range of motion  Neck supple  Cardiovascular  Auscultation of the heart: Normal rate and rhythm, normal S1 and S2 without murmurs  Examination of the extremities for edema and/or varicosities: Normal  Pulmonary/Chest  Respiratory effort: No increased work of breathing or signs of respiratory distress  Auscultation of lungs: Clear to auscultation, equal breath sounds bilaterally, no wheezes, rales, no rhonchi  Abdomen  Abdomen: Non-tender, no masses  Liver and spleen: No hepatomegaly or splenomegaly  Musculoskeletal  Gait and station: normal   Digits and Nails: normal without clubbing or cyanosis  Inspection/palpation of joints, bones, and muscles: Normal  Neurological  No nystagmus or asterixis  Skin  Skin and subcutaneous tissue: Normal without rashes or lesions  Lymphatic  Palpation of the lymph nodes in neck: No lymphadenopathy     Psychiatric  Orientation to person, place and time: Normal   Mood and affect: Normal          Labs:  Lab Results   Component Value Date    ALT 26 03/09/2020    AST 24 03/09/2020    BUN 21 08/10/2020    CALCIUM 8 8 08/10/2020     (H) 08/10/2020    CHOL 153 03/27/2018    CO2 27 08/10/2020    CREATININE 1 15 08/10/2020    HDL 38 (L) 08/10/2020    HCT 39 5 08/10/2020    HGB 12 2 08/10/2020    HGBA1C 6 3 (H) 08/10/2020     08/10/2020    K 4 2 08/10/2020    PSA 0 5 10/29/2019     03/27/2018    TRIG 113 08/10/2020    WBC 5 97 08/10/2020         X-Rays & Procedures:   No orders to display           ______________________________________________________________________      Assessment & Plan:     Diagnoses and all orders for this visit:    Globus pharyngeus    Gastroesophageal reflux disease, unspecified whether esophagitis present  -     Ambulatory referral to Gastroenterology      I do not feel this is a gastroenterologic issue  He is already maximally treated for reflux including not eating near bedtime, sleeping elevated, taking omeprazole properly  He has no GERD at this time  This more likely is an ENT issue with his postnasal drip and constant clearing of his throat  Patient will seek ENT evaluation after he checks with his insurance company which ones are in the network  In the meanwhile I did tell him for 2 weeks, as a trial, to double his omeprazole to morning and evening  He does not need an EGD at this point since he has no esophageal symptomatology per se

## 2021-01-05 NOTE — LETTER
January 5, 2021     Franklin Moyer MD  [address]  [facility]  [address]    Patient: Kelby Brewer   YOB: 1955   Date of Visit: 1/5/2021       Dear Dr Justin Hensley: Thank you for referring Selam Aguirre to me for evaluation  Below are my notes for this consultation  If you have questions, please do not hesitate to call me  I look forward to following your patient along with you  Sincerely,        Marjorie Vergara MD        CC: No Recipients  Marjorie Vergara MD  1/5/2021  2:55 PM  Signed  [facility]      Chief Complaint:  Clearing the throat    HPI:  Kelby Brewer is a 72 y o   male who presents with several months of constantly clearing his throat  He feels there is something at the base of his throat that he continually needs to try to clear  He is having a lot of congestion issues and a lot of postnasal drip  Patient had an EGD a couple of years ago which was completely normal   He is on omeprazole 20 mg p o  Q a m  1/2 hour a c  Breakfast and takes it faithfully  He sleeps with the head of the bed elevated  He does not eat near bedtime  He has no GERD symptomatology at this point  This present issue has been going on during the entire fall and winter  He has not seen ENT yet  Patient's liver functions and CBC are normal   Hemoglobin A1c is 6 3  He has no early satiety  He has no other issues at the present time         Review of Systems:   Constitutional: No fever or chills, feels well, no tiredness, no recent weight gain or weight loss  HENT: No complaints of earache, no hearing loss, no nosebleeds, no nasal discharge, no sore throat, no hoarseness  Eyes: No complaints of eye pain, no red eyes, no discharge from eyes, no itchy eyes  Cardiovascular: No complaints of slow heart rate, no fast heart rate, no chest pain, no palpitations, no leg claudication, no lower extremity edema     Respiratory: No complaints of shortness of breath, no wheezing, no cough, no SOB on exertion, no orthopnea  Gastrointestinal: As noted in HPI  Genitourinary: No complaints of dysuria, no incontinence, no hesitancy, no nocturia  Musculoskeletal: No complaints of arthralgia, no myalgias, no joint swelling or stiffness, no limb pain or swelling  Neurological: No complaints of headache, no confusion, no convulsions, no numbness or tingling, no dizziness or fainting, no limb weakness, no difficulty walking  Skin: No complaints of skin rash or skin lesions, no itching, no skin wound, no dry skin  Hematological/Lymphatic: No complaints of swollen glands, does not bleed easy  Allergic/Immunologic: No immunocompromised state  Endocrine:  No complaints of polyuria, no polydipsia  Psychiatric/Behavioral: is not suicidal, no sleep disturbances, no anxiety or depression, no change in personality, no emotional problems  Historical Information   Past Medical History:   Diagnosis Date    Arthritis     Atrial fibrillation (HCC)     Candidiasis, cutaneous     Colon, diverticulosis     Depression     Diabetes mellitus (HCC)     Diverticulosis     GERD (gastroesophageal reflux disease)     Hyperlipidemia     Hypertension     Internal hemorrhoids     Irregular heart beat     aFIB    Pneumonia     Shortness of breath     Sleep apnea     uses cpap    Vertigo of central origin      Past Surgical History:   Procedure Laterality Date    CARDIAC ELECTROPHYSIOLOGY MAPPING AND ABLATION      COLONOSCOPY      EGD AND COLONOSCOPY      UT COLONOSCOPY FLX DX W/COLLJ SPEC WHEN PFRMD N/A 8/29/2018    Procedure: COLONOSCOPY;  Surgeon: Cha Gaytan MD;  [facility]; Service: Gastroenterology    UT ESOPHAGOGASTRODUODENOSCOPY TRANSORAL DIAGNOSTIC N/A 10/10/2017    Procedure: ESOPHAGOGASTRODUODENOSCOPY (EGD); Surgeon: Cha Gaytan MD;  [facility];   Service: Gastroenterology    TONSILLECTOMY       Social History Social History     Substance and Sexual Activity   Alcohol Use Yes    Frequency: Monthly or less    Drinks per session: 3 or 4    Binge frequency: Never    Comment: RARE     Social History     Substance and Sexual Activity   Drug Use No     Social History     Tobacco Use   Smoking Status Former Smoker    Packs/day: 2 00    Types: Cigarettes    Quit date: 10/10/1983    Years since quittin 2   Smokeless Tobacco Never Used   Tobacco Comment    As per allscripts: Never a smoker     Family History   Problem Relation Age of Onset    Other Mother         CABG,Hepatic Disorder    Coronary artery disease Mother     Other Father         CABG    Diabetes Father     Cancer Family         Gastrointestinal         Current Medications: has a current medication list which includes the following prescription(s): albuterol, alprazolam, diltiazem, escitalopram, fenofibrate, flecainide, meclizine, metaxalone, metformin, omeprazole, rosuvastatin, tramadol, and xarelto  Vital Signs: /62   Pulse 68   Ht 5' 8" (1 727 m)   Wt 99 3 kg (219 lb)   BMI 33 30 kg/m²       Physical Exam:   Constitutional  General Appearance: No acute distress, well appearing and well nourished  Head  Normocephalic  Eyes  Conjunctivae and lids: No swelling, erythema, or discharge  Pupils and irises: Equal, round and reactive to light  Ears, Nose, Mouth, and Throat  External inspection of ears and nose: Normal  Nasal mucosa, septum and turbinates: Normal without edema or erythema/   Oropharynx: Normal with no erythema, edema, exudate or lesions  Neck  Normal range of motion  Neck supple  Cardiovascular  Auscultation of the heart: Normal rate and rhythm, normal S1 and S2 without murmurs  Examination of the extremities for edema and/or varicosities: Normal  Pulmonary/Chest  Respiratory effort: No increased work of breathing or signs of respiratory distress     Auscultation of lungs: Clear to auscultation, equal breath sounds bilaterally, no wheezes, rales, no rhonchi  Abdomen  Abdomen: Non-tender, no masses  Liver and spleen: No hepatomegaly or splenomegaly  Musculoskeletal  Gait and station: normal   Digits and Nails: normal without clubbing or cyanosis  Inspection/palpation of joints, bones, and muscles: Normal  Neurological  No nystagmus or asterixis  Skin  Skin and subcutaneous tissue: Normal without rashes or lesions  Lymphatic  Palpation of the lymph nodes in neck: No lymphadenopathy  Psychiatric  Orientation to person, place and time: Normal   Mood and affect: Normal          Labs:  Lab Results   Component Value Date    ALT 26 03/09/2020    AST 24 03/09/2020    BUN 21 08/10/2020    CALCIUM 8 8 08/10/2020     (H) 08/10/2020    CHOL 153 03/27/2018    CO2 27 08/10/2020    CREATININE 1 15 08/10/2020    HDL 38 (L) 08/10/2020    HCT 39 5 08/10/2020    HGB 12 2 08/10/2020    HGBA1C 6 3 (H) 08/10/2020     08/10/2020    K 4 2 08/10/2020    PSA 0 5 10/29/2019     03/27/2018    TRIG 113 08/10/2020    WBC 5 97 08/10/2020         X-Rays & Procedures:   No orders to display           ______________________________________________________________________      Assessment & Plan:     Diagnoses and all orders for this visit:    Globus pharyngeus    Gastroesophageal reflux disease, unspecified whether esophagitis present  -     Ambulatory referral to Gastroenterology      I do not feel this is a gastroenterologic issue  He is already maximally treated for reflux including not eating near bedtime, sleeping elevated, taking omeprazole properly  He has no GERD at this time  This more likely is an ENT issue with his postnasal drip and constant clearing of his throat  Patient will seek ENT evaluation after he checks with his insurance company which ones are in the network  In the meanwhile I did tell him for 2 weeks, as a trial, to double his omeprazole to morning and evening    He does not need an EGD at this point since he has no esophageal symptomatology per se

## 2021-02-15 ENCOUNTER — HOSPITAL ENCOUNTER (OUTPATIENT)
Dept: ULTRASOUND IMAGING | Facility: CLINIC | Age: 66
Discharge: HOME | End: 2021-02-15
Payer: COMMERCIAL

## 2021-02-15 DIAGNOSIS — Z13.6 SCREENING FOR AAA (ABDOMINAL AORTIC ANEURYSM): ICD-10-CM

## 2021-02-15 PROCEDURE — 76775 US EXAM ABDO BACK WALL LIM: CPT

## 2021-03-10 DIAGNOSIS — Z23 ENCOUNTER FOR IMMUNIZATION: ICD-10-CM

## 2021-04-19 ENCOUNTER — APPOINTMENT (OUTPATIENT)
Dept: LAB | Facility: CLINIC | Age: 66
End: 2021-04-19
Payer: COMMERCIAL

## 2021-04-19 DIAGNOSIS — I48.11 LONGSTANDING PERSISTENT ATRIAL FIBRILLATION (HCC): ICD-10-CM

## 2021-04-19 DIAGNOSIS — Z11.4 ENCOUNTER FOR SCREENING FOR HUMAN IMMUNODEFICIENCY VIRUS (HIV): ICD-10-CM

## 2021-04-19 DIAGNOSIS — Z11.59 NEED FOR HEPATITIS C SCREENING TEST: ICD-10-CM

## 2021-04-19 DIAGNOSIS — Z12.5 SCREENING PSA (PROSTATE SPECIFIC ANTIGEN): ICD-10-CM

## 2021-04-19 DIAGNOSIS — E11.9 CONTROLLED TYPE 2 DIABETES MELLITUS WITHOUT COMPLICATION, WITHOUT LONG-TERM CURRENT USE OF INSULIN (HCC): ICD-10-CM

## 2021-04-19 LAB
ALBUMIN SERPL BCP-MCNC: 3.8 G/DL (ref 3.5–5)
ALP SERPL-CCNC: 42 U/L (ref 46–116)
ALT SERPL W P-5'-P-CCNC: 26 U/L (ref 12–78)
ANION GAP SERPL CALCULATED.3IONS-SCNC: 4 MMOL/L (ref 4–13)
AST SERPL W P-5'-P-CCNC: 22 U/L (ref 5–45)
BASOPHILS # BLD AUTO: 0.1 THOUSANDS/ΜL (ref 0–0.1)
BASOPHILS NFR BLD AUTO: 2 % (ref 0–1)
BILIRUB SERPL-MCNC: 0.61 MG/DL (ref 0.2–1)
BUN SERPL-MCNC: 19 MG/DL (ref 5–25)
CALCIUM SERPL-MCNC: 9.2 MG/DL (ref 8.3–10.1)
CHLORIDE SERPL-SCNC: 110 MMOL/L (ref 100–108)
CHOLEST SERPL-MCNC: 150 MG/DL (ref 50–200)
CO2 SERPL-SCNC: 27 MMOL/L (ref 21–32)
CREAT SERPL-MCNC: 1.19 MG/DL (ref 0.6–1.3)
CREAT UR-MCNC: 192 MG/DL
EOSINOPHIL # BLD AUTO: 0.22 THOUSAND/ΜL (ref 0–0.61)
EOSINOPHIL NFR BLD AUTO: 4 % (ref 0–6)
ERYTHROCYTE [DISTWIDTH] IN BLOOD BY AUTOMATED COUNT: 14.2 % (ref 11.6–15.1)
EST. AVERAGE GLUCOSE BLD GHB EST-MCNC: 137 MG/DL
GFR SERPL CREATININE-BSD FRML MDRD: 63 ML/MIN/1.73SQ M
GLUCOSE P FAST SERPL-MCNC: 108 MG/DL (ref 65–99)
HBA1C MFR BLD: 6.4 %
HCT VFR BLD AUTO: 42.1 % (ref 36.5–49.3)
HCV AB SER QL: NORMAL
HDLC SERPL-MCNC: 41 MG/DL
HGB BLD-MCNC: 13 G/DL (ref 12–17)
IMM GRANULOCYTES # BLD AUTO: 0.02 THOUSAND/UL (ref 0–0.2)
IMM GRANULOCYTES NFR BLD AUTO: 0 % (ref 0–2)
LDLC SERPL CALC-MCNC: 84 MG/DL (ref 0–100)
LYMPHOCYTES # BLD AUTO: 2.27 THOUSANDS/ΜL (ref 0.6–4.47)
LYMPHOCYTES NFR BLD AUTO: 40 % (ref 14–44)
MCH RBC QN AUTO: 26.9 PG (ref 26.8–34.3)
MCHC RBC AUTO-ENTMCNC: 30.9 G/DL (ref 31.4–37.4)
MCV RBC AUTO: 87 FL (ref 82–98)
MICROALBUMIN UR-MCNC: 8.3 MG/L (ref 0–20)
MICROALBUMIN/CREAT 24H UR: 4 MG/G CREATININE (ref 0–30)
MONOCYTES # BLD AUTO: 0.36 THOUSAND/ΜL (ref 0.17–1.22)
MONOCYTES NFR BLD AUTO: 6 % (ref 4–12)
NEUTROPHILS # BLD AUTO: 2.76 THOUSANDS/ΜL (ref 1.85–7.62)
NEUTS SEG NFR BLD AUTO: 48 % (ref 43–75)
NRBC BLD AUTO-RTO: 0 /100 WBCS
PLATELET # BLD AUTO: 308 THOUSANDS/UL (ref 149–390)
PMV BLD AUTO: 10.5 FL (ref 8.9–12.7)
POTASSIUM SERPL-SCNC: 3.9 MMOL/L (ref 3.5–5.3)
PROT SERPL-MCNC: 7 G/DL (ref 6.4–8.2)
PSA SERPL-MCNC: 0.5 NG/ML (ref 0–4)
RBC # BLD AUTO: 4.84 MILLION/UL (ref 3.88–5.62)
SODIUM SERPL-SCNC: 141 MMOL/L (ref 136–145)
TRIGL SERPL-MCNC: 123 MG/DL
WBC # BLD AUTO: 5.73 THOUSAND/UL (ref 4.31–10.16)

## 2021-04-19 PROCEDURE — 3061F NEG MICROALBUMINURIA REV: CPT | Performed by: FAMILY MEDICINE

## 2021-04-19 PROCEDURE — 86803 HEPATITIS C AB TEST: CPT

## 2021-04-19 PROCEDURE — 36415 COLL VENOUS BLD VENIPUNCTURE: CPT

## 2021-04-19 PROCEDURE — 83036 HEMOGLOBIN GLYCOSYLATED A1C: CPT

## 2021-04-19 PROCEDURE — 82570 ASSAY OF URINE CREATININE: CPT | Performed by: FAMILY MEDICINE

## 2021-04-19 PROCEDURE — 82043 UR ALBUMIN QUANTITATIVE: CPT | Performed by: FAMILY MEDICINE

## 2021-04-19 PROCEDURE — G0103 PSA SCREENING: HCPCS

## 2021-04-19 PROCEDURE — 87389 HIV-1 AG W/HIV-1&-2 AB AG IA: CPT

## 2021-04-19 PROCEDURE — 80053 COMPREHEN METABOLIC PANEL: CPT

## 2021-04-19 PROCEDURE — 3044F HG A1C LEVEL LT 7.0%: CPT | Performed by: FAMILY MEDICINE

## 2021-04-19 PROCEDURE — 85025 COMPLETE CBC W/AUTO DIFF WBC: CPT

## 2021-04-19 PROCEDURE — 80061 LIPID PANEL: CPT

## 2021-04-20 LAB — HIV 1+2 AB+HIV1 P24 AG SERPL QL IA: NORMAL

## 2021-04-22 ENCOUNTER — RA CDI HCC (OUTPATIENT)
Dept: OTHER | Facility: HOSPITAL | Age: 66
End: 2021-04-22

## 2021-04-22 NOTE — PROGRESS NOTES
[facility]  coding opportunities             Chart reviewed, (number of) suggestions sent to provider: 2     Problem listed updated  Provider Accepted, (number of) suggestions accepted: 2        Patients insurance company: [address]  (Medicare Advantage and Commercial)     Visit status: Patient arrived for their scheduled appointment  Provider used suggestions      [facility] 75  coding opportunities             Chart reviewed, (number of) suggestions sent to provider: 2     Problem listed updated   Provider Accepted, (number of) suggestions accepted: 2        Patients insurance company: [address]  (Medicare Advantage and Commercial)           [facility] 75  coding opportunities             Chart reviewed, (number of) suggestions sent to provider: 2           Patients insurance company: [address]  (Medicare Advantage and Commercial)             [id number] without complications [facility])    [id number] Atrial fibrillation ([facility] )     If this is correct, please document and assess at your next visit 4/29/21

## 2021-04-29 ENCOUNTER — OFFICE VISIT (OUTPATIENT)
Dept: FAMILY MEDICINE CLINIC | Facility: CLINIC | Age: 66
End: 2021-04-29
Payer: COMMERCIAL

## 2021-04-29 VITALS
HEART RATE: 68 BPM | TEMPERATURE: 99.2 F | HEIGHT: 68 IN | SYSTOLIC BLOOD PRESSURE: 136 MMHG | WEIGHT: 220 LBS | DIASTOLIC BLOOD PRESSURE: 84 MMHG | BODY MASS INDEX: 33.34 KG/M2

## 2021-04-29 DIAGNOSIS — I48.11 LONGSTANDING PERSISTENT ATRIAL FIBRILLATION (HCC): ICD-10-CM

## 2021-04-29 DIAGNOSIS — R06.00 DOE (DYSPNEA ON EXERTION): ICD-10-CM

## 2021-04-29 DIAGNOSIS — R09.89 THROAT CLEARING: ICD-10-CM

## 2021-04-29 DIAGNOSIS — E11.9 CONTROLLED TYPE 2 DIABETES MELLITUS WITHOUT COMPLICATION, WITHOUT LONG-TERM CURRENT USE OF INSULIN (HCC): Primary | ICD-10-CM

## 2021-04-29 DIAGNOSIS — E78.2 MIXED HYPERLIPIDEMIA: ICD-10-CM

## 2021-04-29 DIAGNOSIS — I10 BENIGN ESSENTIAL HYPERTENSION: ICD-10-CM

## 2021-04-29 PROBLEM — J44.9 CHRONIC OBSTRUCTIVE PULMONARY DISEASE (HCC): Status: ACTIVE | Noted: 2021-04-29

## 2021-04-29 PROBLEM — J44.9 CHRONIC OBSTRUCTIVE PULMONARY DISEASE (HCC): Status: RESOLVED | Noted: 2021-04-29 | Resolved: 2021-04-29

## 2021-04-29 PROBLEM — Z11.4 ENCOUNTER FOR SCREENING FOR HUMAN IMMUNODEFICIENCY VIRUS (HIV): Status: RESOLVED | Noted: 2018-08-20 | Resolved: 2021-04-29

## 2021-04-29 PROBLEM — R06.09 DOE (DYSPNEA ON EXERTION): Status: ACTIVE | Noted: 2021-04-29

## 2021-04-29 PROCEDURE — 1036F TOBACCO NON-USER: CPT | Performed by: FAMILY MEDICINE

## 2021-04-29 PROCEDURE — 1160F RVW MEDS BY RX/DR IN RCRD: CPT | Performed by: FAMILY MEDICINE

## 2021-04-29 PROCEDURE — 99214 OFFICE O/P EST MOD 30 MIN: CPT | Performed by: FAMILY MEDICINE

## 2021-04-29 PROCEDURE — 3079F DIAST BP 80-89 MM HG: CPT | Performed by: FAMILY MEDICINE

## 2021-04-29 PROCEDURE — 3008F BODY MASS INDEX DOCD: CPT | Performed by: FAMILY MEDICINE

## 2021-04-29 PROCEDURE — 3075F SYST BP GE 130 - 139MM HG: CPT | Performed by: FAMILY MEDICINE

## 2021-04-29 NOTE — ASSESSMENT & PLAN NOTE
Lab Results   Component Value Date    HGBA1C 6 4 (H) 04/19/2021   well controlled stay on current medication

## 2021-04-29 NOTE — PROGRESS NOTES
Assessment/Plan:       Problem List Items Addressed This Visit        Endocrine    Controlled type 2 diabetes mellitus ([facility] ) - Primary       Lab Results   Component Value Date    HGBA1C 6 4 (H) 04/19/2021   well controlled stay on current medication         Relevant Orders    Lipid Panel with Direct LDL reflex    Comprehensive metabolic panel    Hemoglobin A1C       Cardiovascular and Mediastinum    Benign essential hypertension    Atrial fibrillation ([facility] )     Follow up with Cardiology         Relevant Orders    Ambulatory referral to Cardiology       Other    Mixed hyperlipidemia    BENITEZ (dyspnea on exertion)     CXR and PFTs  Advised him to discuss his symptoms with cardiology         Relevant Orders    XR chest pa & lateral    Complete PFT with post bronchodilator    Throat clearing     Referral to ENT         Relevant Orders    Ambulatory Referral to Otolaryngology            Subjective:      Patient ID: Anisha Chilel is a 77 y o  male  He is here for lab review  Diabetes- is well controlled  A1c is 6 4%  Currently on metformin 500 mg b i d  glucoses are in the 110s  lipids-total cholesterol 150, LDL 84  Patient is on a statin  PSA is within normal limits  Hepatitis-C screening negative  HIV negative  CMP and CBC normal  HTN - fairly well controlled  A Fib-on Flecainide, Cardizem, Xarelto  Sees Dr Thalia Hewitt  Last stress ECHO 2019  Says he is supposed to see cardiology soon but is awaiting a call    He describes shortness of breath over the past few years  No cough  Mild wheeze  He denies chest pain or pressure  Former smoker - quit > 30 yrs ago  He has chronic throat clearing for years  Says he has tried Allegra  He previously saw ENT but says it was more for his ears  No nasal congestion  He does have GERD and is on Omeprazole  He says this is working well for him        The following portions of the patient's history were reviewed and updated as appropriate:   He  has a past medical history of Arthritis, Atrial fibrillation ([facility] ), Candidiasis, cutaneous, Colon, diverticulosis, Depression, Diabetes mellitus ([place] [address] ), Diverticulosis, GERD (gastroesophageal reflux disease), Hyperlipidemia, Hypertension, Internal hemorrhoids, Irregular heart beat, Pneumonia, Shortness of breath, Sleep apnea, and Vertigo of central origin  He   Patient Active Problem List    Diagnosis Date Noted    BENITEZ (dyspnea on exertion) 04/29/2021    Throat clearing 04/29/2021    Gastroesophageal reflux disease 12/30/2020    Chronic vertigo 12/30/2020    Class 1 obesity due to excess calories with serious comorbidity and body mass index (BMI) of 33 0 to 33 9 in adult 12/30/2020    Diastasis recti 12/30/2020    Chronic anticoagulation 08/24/2020    Atrial fibrillation ([place] [address] ) 05/26/2020    Mixed hyperlipidemia 05/26/2020    Anxiety and depression 11/17/2017    Controlled type 2 diabetes mellitus ([place] [address] ) 07/07/2017    Benign essential hypertension 03/28/2014     He  has a past surgical history that includes Tonsillectomy; EGD AND COLONOSCOPY; Colonoscopy; pr esophagogastroduodenoscopy transoral diagnostic (N/A, 10/10/2017); pr colonoscopy flx dx w/collj spec when pfrmd (N/A, 8/29/2018); and Cardiac electrophysiology mapping and ablation  His family history includes Cancer in his family; Coronary artery disease in his mother; Diabetes in his father; Other in his father and mother  He  reports that he quit smoking about 46 years ago  His smoking use included cigarettes  He started smoking about 59 years ago  He has a 12 00 pack-year smoking history  He has never used smokeless tobacco  He reports current alcohol use  He reports that he does not use drugs    Current Outpatient Medications   Medication Sig Dispense Refill    albuterol (Ventolin HFA) 90 mcg/act inhaler Inhale 2 puffs every 6 (six) hours as needed for wheezing or shortness of breath 18 g 0    ALPRAZolam (XANAX) 0 25 mg tablet Take 1 tablet (0 25 mg total) by mouth daily at bedtime as needed for anxiety 30 tablet 0    diltiazem (CARDIZEM) 120 MG tablet Take 1 tablet (120 mg total) by mouth daily 90 tablet 3    escitalopram (LEXAPRO) 10 mg tablet Take 1 tablet (10 mg total) by mouth daily 90 tablet 5    fenofibrate (TRICOR) 145 mg tablet Take 1 tablet (145 mg total) by mouth daily 90 tablet 3    flecainide (TAMBOCOR) 100 mg tablet Take 100 mg by mouth 2 (two) times a day      meclizine (ANTIVERT) 25 mg tablet Take 2 tablets (50 mg total) by mouth every 8 (eight) hours as needed for dizziness 270 tablet 3    metaxalone (SKELAXIN) 800 mg tablet Take 1 tablet (800 mg total) by mouth as needed for muscle spasms 90 tablet 3    metFORMIN (GLUCOPHAGE) 500 mg tablet Take 1 tablet (500 mg total) by mouth 2 (two) times a day with meals 180 tablet 3    omeprazole (PriLOSEC) 40 MG capsule Take 1 capsule (40 mg total) by mouth daily 90 capsule 3    rosuvastatin (CRESTOR) 40 MG tablet Take 1 tablet (40 mg total) by mouth daily 90 tablet 3    traMADol (ULTRAM) 50 mg tablet Take 50 mg by mouth every 6 (six) hours as needed for moderate pain      Xarelto 20 MG tablet Take 1 tablet (20 mg total) by mouth daily with breakfast 90 tablet 3     No current facility-administered medications for this visit        Current Outpatient Medications on File Prior to Visit   Medication Sig    albuterol (Ventolin HFA) 90 mcg/act inhaler Inhale 2 puffs every 6 (six) hours as needed for wheezing or shortness of breath    ALPRAZolam (XANAX) 0 25 mg tablet Take 1 tablet (0 25 mg total) by mouth daily at bedtime as needed for anxiety    diltiazem (CARDIZEM) 120 MG tablet Take 1 tablet (120 mg total) by mouth daily    escitalopram (LEXAPRO) 10 mg tablet Take 1 tablet (10 mg total) by mouth daily    fenofibrate (TRICOR) 145 mg tablet Take 1 tablet (145 mg total) by mouth daily    flecainide (TAMBOCOR) 100 mg tablet Take 100 mg by mouth 2 (two) times a day    meclizine (ANTIVERT) 25 mg tablet Take 2 tablets (50 mg total) by mouth every 8 (eight) hours as needed for dizziness    metaxalone (SKELAXIN) 800 mg tablet Take 1 tablet (800 mg total) by mouth as needed for muscle spasms    metFORMIN (GLUCOPHAGE) 500 mg tablet Take 1 tablet (500 mg total) by mouth 2 (two) times a day with meals    omeprazole (PriLOSEC) 40 MG capsule Take 1 capsule (40 mg total) by mouth daily    rosuvastatin (CRESTOR) 40 MG tablet Take 1 tablet (40 mg total) by mouth daily    traMADol (ULTRAM) 50 mg tablet Take 50 mg by mouth every 6 (six) hours as needed for moderate pain    Xarelto 20 MG tablet Take 1 tablet (20 mg total) by mouth daily with breakfast     No current facility-administered medications on file prior to visit  He has No Known Allergies       Review of Systems   Constitutional: Negative  HENT:        Constant throat clearing   Eyes: Negative  Respiratory: Positive for cough and shortness of breath  Cardiovascular: Negative  Negative for chest pain, palpitations and leg swelling  Gastrointestinal:        GERD   Allergic/Immunologic: Positive for environmental allergies  Objective:      /84   Pulse 68   Temp 99 2 °F (37 3 °C)   Ht 5' 8" (1 727 m)   Wt 99 8 kg (220 lb)   BMI 33 45 kg/m²          Physical Exam  Vitals signs and nursing note reviewed  Constitutional:       General: He is not in acute distress  Appearance: Normal appearance  He is well-developed  He is not diaphoretic  HENT:      Head: Normocephalic and atraumatic  Mouth/Throat:      Mouth: Mucous membranes are moist       Pharynx: Oropharynx is clear  No oropharyngeal exudate or posterior oropharyngeal erythema  Cardiovascular:      Rate and Rhythm: Normal rate and regular rhythm  Heart sounds: Normal heart sounds  No murmur  No friction rub  No gallop  Pulmonary:      Effort: Pulmonary effort is normal  No respiratory distress  Breath sounds: Normal breath sounds   No wheezing or rales    Chest:      Chest wall: No tenderness  Abdominal:      Tenderness: There is no abdominal tenderness  Musculoskeletal:         General: No swelling  Right lower leg: No edema  Left lower leg: No edema  Skin:     Findings: No rash  Neurological:      Mental Status: He is alert and oriented to person, place, and time  Psychiatric:         Mood and Affect: Mood normal          Behavior: Behavior normal          Thought Content:  Thought content normal          Judgment: Judgment normal

## 2021-05-11 ENCOUNTER — HOSPITAL ENCOUNTER (OUTPATIENT)
Dept: RADIOLOGY | Facility: HOSPITAL | Age: 66
Discharge: HOME | End: 2021-05-11
Payer: COMMERCIAL

## 2021-05-11 DIAGNOSIS — R06.00 DOE (DYSPNEA ON EXERTION): ICD-10-CM

## 2021-05-11 PROBLEM — J31.0 NONALLERGIC RHINITIS: Chronic | Status: ACTIVE | Noted: 2021-05-11

## 2021-05-11 PROBLEM — G43.109 OCULAR MIGRAINE: Chronic | Status: ACTIVE | Noted: 2021-05-11

## 2021-05-11 PROCEDURE — 71046 X-RAY EXAM CHEST 2 VIEWS: CPT

## 2021-05-13 ENCOUNTER — APPOINTMENT (OUTPATIENT)
Dept: LAB | Facility: CLINIC | Age: 66
End: 2021-05-13
Payer: COMMERCIAL

## 2021-05-13 DIAGNOSIS — R09.89 THROAT CLEARING: ICD-10-CM

## 2021-05-13 PROCEDURE — 36415 COLL VENOUS BLD VENIPUNCTURE: CPT

## 2021-05-13 PROCEDURE — 83516 IMMUNOASSAY NONANTIBODY: CPT

## 2021-05-13 PROCEDURE — 86003 ALLG SPEC IGE CRUDE XTRC EA: CPT

## 2021-05-14 DIAGNOSIS — E78.00 HIGH CHOLESTEROL: ICD-10-CM

## 2021-05-14 RX ORDER — ROSUVASTATIN CALCIUM 40 MG/1
TABLET, COATED ORAL
Qty: 90 TABLET | Refills: 3 | Status: SHIPPED | OUTPATIENT
Start: 2021-05-14 | End: 2021-10-28 | Stop reason: SDUPTHER

## 2021-05-21 ENCOUNTER — HOSPITAL ENCOUNTER (OUTPATIENT)
Dept: RADIOLOGY | Facility: HOSPITAL | Age: 66
Discharge: HOME | End: 2021-05-21
Attending: OTOLARYNGOLOGY
Payer: COMMERCIAL

## 2021-05-21 DIAGNOSIS — R09.82 PND (POST-NASAL DRIP): ICD-10-CM

## 2021-05-21 DIAGNOSIS — R09.89 THROAT CLEARING: ICD-10-CM

## 2021-05-21 PROCEDURE — 74220 X-RAY XM ESOPHAGUS 1CNTRST: CPT

## 2021-05-22 LAB
GLIADIN IGG SER IA-ACNC: 2 UNITS (ref 0–19)
GLIADIN PEPTIDE+TTG IGA+IGG SER QL IA: NEGATIVE
Lab: NORMAL
NOTE: NORMAL
WHEAT IGE QN: <0.1 KU/L

## 2021-05-26 ENCOUNTER — HOSPITAL ENCOUNTER (OUTPATIENT)
Dept: PULMONOLOGY | Facility: HOSPITAL | Age: 66
Discharge: HOME | End: 2021-05-26
Payer: COMMERCIAL

## 2021-05-26 DIAGNOSIS — R06.00 DOE (DYSPNEA ON EXERTION): ICD-10-CM

## 2021-05-26 PROCEDURE — 94729 DIFFUSING CAPACITY: CPT

## 2021-05-26 PROCEDURE — 94060 EVALUATION OF WHEEZING: CPT

## 2021-05-26 PROCEDURE — 94729 DIFFUSING CAPACITY: CPT | Performed by: INTERNAL MEDICINE

## 2021-05-26 PROCEDURE — 94060 EVALUATION OF WHEEZING: CPT | Performed by: INTERNAL MEDICINE

## 2021-05-26 PROCEDURE — 94760 N-INVAS EAR/PLS OXIMETRY 1: CPT

## 2021-05-26 PROCEDURE — 94727 GAS DIL/WSHOT DETER LNG VOL: CPT

## 2021-05-26 PROCEDURE — 94727 GAS DIL/WSHOT DETER LNG VOL: CPT | Performed by: INTERNAL MEDICINE

## 2021-05-26 RX ORDER — ALBUTEROL SULFATE 2.5 MG/3ML
2.5 SOLUTION RESPIRATORY (INHALATION) ONCE
Status: COMPLETED | OUTPATIENT
Start: 2021-05-26 | End: 2021-05-26

## 2021-05-26 RX ADMIN — ALBUTEROL SULFATE 2.5 MG: 2.5 SOLUTION RESPIRATORY (INHALATION) at 09:05

## 2021-06-10 ENCOUNTER — HOSPITAL ENCOUNTER (OUTPATIENT)
Dept: RADIOLOGY | Facility: HOSPITAL | Age: 66
Discharge: HOME | End: 2021-06-10
Attending: OTOLARYNGOLOGY
Payer: COMMERCIAL

## 2021-06-10 DIAGNOSIS — K21.9 GASTROESOPHAGEAL REFLUX DISEASE, UNSPECIFIED WHETHER ESOPHAGITIS PRESENT: ICD-10-CM

## 2021-06-10 DIAGNOSIS — R09.82 PND (POST-NASAL DRIP): ICD-10-CM

## 2021-06-10 DIAGNOSIS — R09.89 THROAT CLEARING: ICD-10-CM

## 2021-06-10 PROCEDURE — 92611 MOTION FLUOROSCOPY/SWALLOW: CPT

## 2021-06-10 PROCEDURE — 74230 X-RAY XM SWLNG FUNCJ C+: CPT

## 2021-06-10 NOTE — PROCEDURES
Speech-Language Pathology Video Barium Swallow Study        Patient Name: Marilin Alfaro    CSUPT'C Date: 6/10/2021     Problem List  Patient Active Problem List   Diagnosis    Anxiety and depression    Benign essential hypertension    Controlled type 2 diabetes mellitus ([facility] )    Atrial fibrillation ([facility] )    Mixed hyperlipidemia    Chronic anticoagulation    Gastroesophageal reflux disease    Chronic vertigo    Class 1 obesity due to excess calories with serious comorbidity and body mass index (BMI) of 33 0 to 33 9 in adult    Diastasis recti    BENITEZ (dyspnea on exertion)    Throat clearing    Nonallergic rhinitis    Ocular migraine       Past Medical History  Past Medical History:   Diagnosis Date    Arthritis     Atrial fibrillation (HCC)     Candidiasis, cutaneous     Colon, diverticulosis     Depression     Diabetes mellitus (HCC)     Diverticulosis     GERD (gastroesophageal reflux disease)     Hyperlipidemia     Hypertension     Internal hemorrhoids     Irregular heart beat     aFIB    Pneumonia     Shortness of breath     Sleep apnea     uses cpap    Vertigo of central origin        Past Surgical History  Past Surgical History:   Procedure Laterality Date    CARDIAC ELECTROPHYSIOLOGY MAPPING AND ABLATION      COLONOSCOPY      EGD AND COLONOSCOPY      DE COLONOSCOPY FLX DX W/COLLJ SPEC WHEN PFRMD N/A 8/29/2018    Procedure: COLONOSCOPY;  Surgeon: Letha Arriaga MD;  Location: [facility]; Service: Gastroenterology    DE ESOPHAGOGASTRODUODENOSCOPY TRANSORAL DIAGNOSTIC N/A 10/10/2017    Procedure: ESOPHAGOGASTRODUODENOSCOPY (EGD); Surgeon: Letha Arriaga MD;  [facility]; Service: Gastroenterology    TONSILLECTOMY           General Information;  Pt is a 77 y o  male who was referred for VBS to further assess swallow function  He c/o frequent throat clearing/couging with associated raw/sore throat   Admits to recent weight loss however reports this is planned  Reports GERD  Denies any recent PNAs  Stated he has had these sxs for over 20 years  Recently seen by ENT with laryngoscopy which was unremarkable  Esophagram generally WFL also with noted small sliding hiatal hernia  Speech/Swallow Mech:   Facial: symmetrical  Labial: WFL  Lingual: WFL  Velum: symmetrical  Mandible: adequate ROM  Dentition: adequate  Vocal quality:clear/adequate   Volitional Cough: strong/productive   Respiratory: RA    Previous VBS: none    Consistencies Assessed and Performance   Pt was seen in radiology for a Video Barium Swallow Study, seated in the upright position and viewed laterally with the following consistencies:     Administered: thin liquids, puree, soft solids and hard solids  Specific materials administered: Barium laden applesauce, moistened crackers, hard cookie, thin liquids, 13mm barium pill  Liquids were administered by cup and straw  Results are as follows:     **Images are available for review on PACS    Oral stage:  Lip closure: adequate  Mastication: adequate  Bolus formation: adequate  Bolus control: adequate  Transfer: adequate  Residue: none    Pharyngeal stage:  Swallow promptness: prompt  Spill to valleculae:-  Spill to pyriforms:-  Epiglottic inversion: adequate  Laryngeal rise: adequate  Pharyngeal constriction: adequate  Vallecular retention: -  Pyriform retention:-  PPW coating -  Osteophytes: -  CP prominence: -   Retropulsion from prominence:  Not observed  Transient penetration:   Epiglottic undercoat: -  Penetration: none  Aspiration: none  Strategies: -  Response to aspiration: -    Screening of Esophageal stage:  Mild retention was noted throughout the esophagus - majority cleared with liquid wash  This is not a formal assessment of the esophagus  Assessment Summary;  Pts oropharyngeal swallow function appears generally WFL at this time with the materials administered today   He does not appear to be at risk for aspiration at this time  Discussed vocal health/hygiene and strategies to reduce habitual cough/throat clear  Patient to f/u ENT  Recommendations; Recommend regular diet and thin liquids, with upright posture, slow rate of feeding, small bites/sips and alternating bites and sips  Recommended Form of Meds: as tolerated   Reflux Precautions    Results reviewed with patient      JOVANI Padron , [address] [facility]  Speech Language Pathologist   Available via [address] [place] [address] #[id number]  [place] #[id number]

## 2021-06-14 ENCOUNTER — VBI (OUTPATIENT)
Dept: ADMINISTRATIVE | Facility: OTHER | Age: 66
End: 2021-06-14

## 2021-10-18 ENCOUNTER — APPOINTMENT (OUTPATIENT)
Dept: LAB | Facility: CLINIC | Age: 66
End: 2021-10-18
Payer: COMMERCIAL

## 2021-10-18 DIAGNOSIS — E11.9 CONTROLLED TYPE 2 DIABETES MELLITUS WITHOUT COMPLICATION, WITHOUT LONG-TERM CURRENT USE OF INSULIN (HCC): ICD-10-CM

## 2021-10-18 LAB
ALBUMIN SERPL BCP-MCNC: 3.6 G/DL (ref 3.5–5)
ALP SERPL-CCNC: 42 U/L (ref 46–116)
ALT SERPL W P-5'-P-CCNC: 22 U/L (ref 12–78)
ANION GAP SERPL CALCULATED.3IONS-SCNC: 3 MMOL/L (ref 4–13)
AST SERPL W P-5'-P-CCNC: 17 U/L (ref 5–45)
BILIRUB SERPL-MCNC: 0.45 MG/DL (ref 0.2–1)
BUN SERPL-MCNC: 16 MG/DL (ref 5–25)
CALCIUM SERPL-MCNC: 9.4 MG/DL (ref 8.3–10.1)
CHLORIDE SERPL-SCNC: 109 MMOL/L (ref 100–108)
CHOLEST SERPL-MCNC: 153 MG/DL (ref 50–200)
CO2 SERPL-SCNC: 27 MMOL/L (ref 21–32)
CREAT SERPL-MCNC: 1.2 MG/DL (ref 0.6–1.3)
EST. AVERAGE GLUCOSE BLD GHB EST-MCNC: 123 MG/DL
GFR SERPL CREATININE-BSD FRML MDRD: 63 ML/MIN/1.73SQ M
GLUCOSE P FAST SERPL-MCNC: 108 MG/DL (ref 65–99)
HBA1C MFR BLD: 5.9 %
HDLC SERPL-MCNC: 42 MG/DL
LDLC SERPL CALC-MCNC: 74 MG/DL (ref 0–100)
POTASSIUM SERPL-SCNC: 4.1 MMOL/L (ref 3.5–5.3)
PROT SERPL-MCNC: 7.3 G/DL (ref 6.4–8.2)
SODIUM SERPL-SCNC: 139 MMOL/L (ref 136–145)
TRIGL SERPL-MCNC: 185 MG/DL

## 2021-10-18 PROCEDURE — 3044F HG A1C LEVEL LT 7.0%: CPT | Performed by: FAMILY MEDICINE

## 2021-10-18 PROCEDURE — 80061 LIPID PANEL: CPT

## 2021-10-18 PROCEDURE — 80053 COMPREHEN METABOLIC PANEL: CPT

## 2021-10-18 PROCEDURE — 83036 HEMOGLOBIN GLYCOSYLATED A1C: CPT

## 2021-10-18 PROCEDURE — 36415 COLL VENOUS BLD VENIPUNCTURE: CPT

## 2021-10-22 ENCOUNTER — RA CDI HCC (OUTPATIENT)
Dept: OTHER | Facility: HOSPITAL | Age: 66
End: 2021-10-22

## 2021-10-28 ENCOUNTER — OFFICE VISIT (OUTPATIENT)
Dept: FAMILY MEDICINE CLINIC | Facility: CLINIC | Age: 66
End: 2021-10-28
Payer: COMMERCIAL

## 2021-10-28 VITALS
SYSTOLIC BLOOD PRESSURE: 132 MMHG | DIASTOLIC BLOOD PRESSURE: 70 MMHG | BODY MASS INDEX: 32.28 KG/M2 | OXYGEN SATURATION: 98 % | WEIGHT: 213 LBS | HEIGHT: 68 IN | HEART RATE: 56 BPM

## 2021-10-28 DIAGNOSIS — E66.09 CLASS 1 OBESITY DUE TO EXCESS CALORIES WITH SERIOUS COMORBIDITY AND BODY MASS INDEX (BMI) OF 32.0 TO 32.9 IN ADULT: ICD-10-CM

## 2021-10-28 DIAGNOSIS — E11.9 CONTROLLED TYPE 2 DIABETES MELLITUS WITHOUT COMPLICATION, WITHOUT LONG-TERM CURRENT USE OF INSULIN (HCC): ICD-10-CM

## 2021-10-28 DIAGNOSIS — E78.2 MIXED HYPERLIPIDEMIA: ICD-10-CM

## 2021-10-28 DIAGNOSIS — K21.9 GASTROESOPHAGEAL REFLUX DISEASE: ICD-10-CM

## 2021-10-28 DIAGNOSIS — F32.A DEPRESSION, UNSPECIFIED DEPRESSION TYPE: ICD-10-CM

## 2021-10-28 DIAGNOSIS — I48.91 ATRIAL FIBRILLATION, UNSPECIFIED TYPE (HCC): ICD-10-CM

## 2021-10-28 DIAGNOSIS — E78.00 HIGH CHOLESTEROL: ICD-10-CM

## 2021-10-28 DIAGNOSIS — M54.50 LOW BACK PAIN, UNSPECIFIED BACK PAIN LATERALITY, UNSPECIFIED CHRONICITY, UNSPECIFIED WHETHER SCIATICA PRESENT: Primary | ICD-10-CM

## 2021-10-28 PROCEDURE — 1036F TOBACCO NON-USER: CPT | Performed by: FAMILY MEDICINE

## 2021-10-28 PROCEDURE — 3008F BODY MASS INDEX DOCD: CPT | Performed by: FAMILY MEDICINE

## 2021-10-28 PROCEDURE — 3075F SYST BP GE 130 - 139MM HG: CPT | Performed by: FAMILY MEDICINE

## 2021-10-28 PROCEDURE — 99214 OFFICE O/P EST MOD 30 MIN: CPT | Performed by: FAMILY MEDICINE

## 2021-10-28 PROCEDURE — 1160F RVW MEDS BY RX/DR IN RCRD: CPT | Performed by: FAMILY MEDICINE

## 2021-10-28 PROCEDURE — 3078F DIAST BP <80 MM HG: CPT | Performed by: FAMILY MEDICINE

## 2021-10-28 RX ORDER — OMEPRAZOLE 40 MG/1
40 CAPSULE, DELAYED RELEASE ORAL DAILY
Qty: 90 CAPSULE | Refills: 3 | Status: SHIPPED | OUTPATIENT
Start: 2021-10-28 | End: 2022-03-29

## 2021-10-28 RX ORDER — TRAMADOL HYDROCHLORIDE 50 MG/1
50 TABLET ORAL EVERY 6 HOURS PRN
Qty: 90 TABLET | Refills: 1 | Status: SHIPPED | OUTPATIENT
Start: 2021-10-28

## 2021-10-28 RX ORDER — DILTIAZEM HYDROCHLORIDE 120 MG/1
120 TABLET, FILM COATED ORAL EVERY 12 HOURS SCHEDULED
Qty: 180 TABLET | Refills: 3 | Status: SHIPPED | OUTPATIENT
Start: 2021-10-28

## 2021-10-28 RX ORDER — ESCITALOPRAM OXALATE 10 MG/1
10 TABLET ORAL DAILY
Qty: 90 TABLET | Refills: 5 | Status: SHIPPED | OUTPATIENT
Start: 2021-10-28 | End: 2022-05-05 | Stop reason: SDUPTHER

## 2021-10-28 RX ORDER — ROSUVASTATIN CALCIUM 40 MG/1
40 TABLET, COATED ORAL DAILY
Qty: 90 TABLET | Refills: 3 | Status: SHIPPED | OUTPATIENT
Start: 2021-10-28 | End: 2022-03-29

## 2021-10-28 RX ORDER — FENOFIBRATE 145 MG/1
145 TABLET, COATED ORAL DAILY
Qty: 90 TABLET | Refills: 3 | Status: SHIPPED | OUTPATIENT
Start: 2021-10-28 | End: 2022-02-14

## 2021-11-24 LAB
LEFT EYE DIABETIC RETINOPATHY: NORMAL
RIGHT EYE DIABETIC RETINOPATHY: NORMAL

## 2021-12-09 ENCOUNTER — VBI (OUTPATIENT)
Dept: ADMINISTRATIVE | Facility: OTHER | Age: 66
End: 2021-12-09

## 2022-01-30 DIAGNOSIS — E11.9 CONTROLLED TYPE 2 DIABETES MELLITUS WITHOUT COMPLICATION, WITHOUT LONG-TERM CURRENT USE OF INSULIN (HCC): ICD-10-CM

## 2022-02-13 DIAGNOSIS — I48.91 ATRIAL FIBRILLATION, UNSPECIFIED TYPE (HCC): ICD-10-CM

## 2022-02-13 DIAGNOSIS — E78.00 HIGH CHOLESTEROL: ICD-10-CM

## 2022-02-14 RX ORDER — FENOFIBRATE 145 MG/1
TABLET, COATED ORAL
Qty: 90 TABLET | Refills: 3 | Status: SHIPPED | OUTPATIENT
Start: 2022-02-14 | End: 2022-05-05 | Stop reason: SDUPTHER

## 2022-03-16 ENCOUNTER — TELEPHONE (OUTPATIENT)
Dept: FAMILY MEDICINE CLINIC | Facility: CLINIC | Age: 67
End: 2022-03-16

## 2022-03-16 NOTE — TELEPHONE ENCOUNTER
Pt stopped in office to request you give script for: OneTouch Ultra Blue 100 test strips    [facility]/[place]

## 2022-03-29 DIAGNOSIS — K21.9 GASTROESOPHAGEAL REFLUX DISEASE: ICD-10-CM

## 2022-03-29 DIAGNOSIS — E78.00 HIGH CHOLESTEROL: ICD-10-CM

## 2022-03-29 RX ORDER — ROSUVASTATIN CALCIUM 40 MG/1
TABLET, COATED ORAL
Qty: 90 TABLET | Refills: 3 | Status: SHIPPED | OUTPATIENT
Start: 2022-03-29 | End: 2022-05-05 | Stop reason: SDUPTHER

## 2022-03-29 RX ORDER — OMEPRAZOLE 40 MG/1
CAPSULE, DELAYED RELEASE ORAL
Qty: 90 CAPSULE | Refills: 3 | Status: SHIPPED | OUTPATIENT
Start: 2022-03-29 | End: 2022-05-05 | Stop reason: SDUPTHER

## 2022-04-26 ENCOUNTER — APPOINTMENT (OUTPATIENT)
Dept: LAB | Facility: CLINIC | Age: 67
End: 2022-04-26
Payer: COMMERCIAL

## 2022-04-26 DIAGNOSIS — E78.00 HIGH CHOLESTEROL: ICD-10-CM

## 2022-04-26 DIAGNOSIS — E11.9 CONTROLLED TYPE 2 DIABETES MELLITUS WITHOUT COMPLICATION, WITHOUT LONG-TERM CURRENT USE OF INSULIN (HCC): ICD-10-CM

## 2022-04-26 LAB
ALBUMIN SERPL BCP-MCNC: 3.7 G/DL (ref 3.5–5)
ALP SERPL-CCNC: 34 U/L (ref 46–116)
ALT SERPL W P-5'-P-CCNC: 24 U/L (ref 12–78)
ANION GAP SERPL CALCULATED.3IONS-SCNC: 3 MMOL/L (ref 4–13)
AST SERPL W P-5'-P-CCNC: 21 U/L (ref 5–45)
BILIRUB SERPL-MCNC: 0.42 MG/DL (ref 0.2–1)
BUN SERPL-MCNC: 14 MG/DL (ref 5–25)
CALCIUM SERPL-MCNC: 9.1 MG/DL (ref 8.3–10.1)
CHLORIDE SERPL-SCNC: 111 MMOL/L (ref 100–108)
CHOLEST SERPL-MCNC: 128 MG/DL
CO2 SERPL-SCNC: 28 MMOL/L (ref 21–32)
CREAT SERPL-MCNC: 1.19 MG/DL (ref 0.6–1.3)
EST. AVERAGE GLUCOSE BLD GHB EST-MCNC: 134 MG/DL
GFR SERPL CREATININE-BSD FRML MDRD: 62 ML/MIN/1.73SQ M
GLUCOSE P FAST SERPL-MCNC: 100 MG/DL (ref 65–99)
HBA1C MFR BLD: 6.3 %
HDLC SERPL-MCNC: 43 MG/DL
LDLC SERPL CALC-MCNC: 66 MG/DL (ref 0–100)
POTASSIUM SERPL-SCNC: 4.1 MMOL/L (ref 3.5–5.3)
PROT SERPL-MCNC: 6.6 G/DL (ref 6.4–8.2)
SODIUM SERPL-SCNC: 142 MMOL/L (ref 136–145)
TRIGL SERPL-MCNC: 96 MG/DL

## 2022-04-26 PROCEDURE — 80053 COMPREHEN METABOLIC PANEL: CPT

## 2022-04-26 PROCEDURE — 80061 LIPID PANEL: CPT

## 2022-04-26 PROCEDURE — 83036 HEMOGLOBIN GLYCOSYLATED A1C: CPT

## 2022-04-26 PROCEDURE — 36415 COLL VENOUS BLD VENIPUNCTURE: CPT

## 2022-04-28 ENCOUNTER — RA CDI HCC (OUTPATIENT)
Dept: OTHER | Facility: HOSPITAL | Age: 67
End: 2022-04-28

## 2022-04-28 NOTE — PROGRESS NOTES
[facility]  coding opportunities       Chart reviewed, no opportunity found:   [address]        Patients Insurance     Medicare Insurance: [employer] Advantage

## 2022-05-05 ENCOUNTER — TELEPHONE (OUTPATIENT)
Dept: ADMINISTRATIVE | Facility: OTHER | Age: 67
End: 2022-05-05

## 2022-05-05 ENCOUNTER — OFFICE VISIT (OUTPATIENT)
Dept: FAMILY MEDICINE CLINIC | Facility: CLINIC | Age: 67
End: 2022-05-05
Payer: COMMERCIAL

## 2022-05-05 VITALS
DIASTOLIC BLOOD PRESSURE: 82 MMHG | SYSTOLIC BLOOD PRESSURE: 126 MMHG | HEIGHT: 68 IN | WEIGHT: 213 LBS | HEART RATE: 59 BPM | TEMPERATURE: 98.9 F | BODY MASS INDEX: 32.28 KG/M2 | OXYGEN SATURATION: 98 %

## 2022-05-05 DIAGNOSIS — F33.0 MILD EPISODE OF RECURRENT MAJOR DEPRESSIVE DISORDER (HCC): ICD-10-CM

## 2022-05-05 DIAGNOSIS — E11.9 CONTROLLED TYPE 2 DIABETES MELLITUS WITHOUT COMPLICATION, WITHOUT LONG-TERM CURRENT USE OF INSULIN (HCC): ICD-10-CM

## 2022-05-05 DIAGNOSIS — R42 CHRONIC VERTIGO: ICD-10-CM

## 2022-05-05 DIAGNOSIS — K21.9 GASTROESOPHAGEAL REFLUX DISEASE: ICD-10-CM

## 2022-05-05 DIAGNOSIS — F32.A DEPRESSION, UNSPECIFIED DEPRESSION TYPE: ICD-10-CM

## 2022-05-05 DIAGNOSIS — F11.20 CONTINUOUS OPIOID DEPENDENCE (HCC): ICD-10-CM

## 2022-05-05 DIAGNOSIS — Z00.00 MEDICARE ANNUAL WELLNESS VISIT, SUBSEQUENT: ICD-10-CM

## 2022-05-05 DIAGNOSIS — I48.91 ATRIAL FIBRILLATION, UNSPECIFIED TYPE (HCC): ICD-10-CM

## 2022-05-05 DIAGNOSIS — E78.00 HIGH CHOLESTEROL: Primary | ICD-10-CM

## 2022-05-05 LAB
CREATINE UR-MCNC: 300 MG/DL
SL AMB POCT UR MICROALBUMIN: 10

## 2022-05-05 PROCEDURE — 3008F BODY MASS INDEX DOCD: CPT | Performed by: FAMILY MEDICINE

## 2022-05-05 PROCEDURE — 1160F RVW MEDS BY RX/DR IN RCRD: CPT | Performed by: FAMILY MEDICINE

## 2022-05-05 PROCEDURE — G0439 PPPS, SUBSEQ VISIT: HCPCS | Performed by: FAMILY MEDICINE

## 2022-05-05 PROCEDURE — 3288F FALL RISK ASSESSMENT DOCD: CPT | Performed by: FAMILY MEDICINE

## 2022-05-05 PROCEDURE — 3725F SCREEN DEPRESSION PERFORMED: CPT | Performed by: FAMILY MEDICINE

## 2022-05-05 PROCEDURE — 3061F NEG MICROALBUMINURIA REV: CPT | Performed by: FAMILY MEDICINE

## 2022-05-05 PROCEDURE — 82043 UR ALBUMIN QUANTITATIVE: CPT | Performed by: FAMILY MEDICINE

## 2022-05-05 PROCEDURE — 1125F AMNT PAIN NOTED PAIN PRSNT: CPT | Performed by: FAMILY MEDICINE

## 2022-05-05 PROCEDURE — 1170F FXNL STATUS ASSESSED: CPT | Performed by: FAMILY MEDICINE

## 2022-05-05 PROCEDURE — 99214 OFFICE O/P EST MOD 30 MIN: CPT | Performed by: FAMILY MEDICINE

## 2022-05-05 PROCEDURE — 1036F TOBACCO NON-USER: CPT | Performed by: FAMILY MEDICINE

## 2022-05-05 PROCEDURE — 82570 ASSAY OF URINE CREATININE: CPT | Performed by: FAMILY MEDICINE

## 2022-05-05 PROCEDURE — 1003F LEVEL OF ACTIVITY ASSESS: CPT | Performed by: FAMILY MEDICINE

## 2022-05-05 RX ORDER — MECLIZINE HYDROCHLORIDE 25 MG/1
25 TABLET ORAL EVERY 8 HOURS SCHEDULED
Qty: 90 TABLET | Refills: 3 | Status: SHIPPED | OUTPATIENT
Start: 2022-05-05 | End: 2022-05-05 | Stop reason: SDUPTHER

## 2022-05-05 RX ORDER — ESCITALOPRAM OXALATE 10 MG/1
10 TABLET ORAL DAILY
Qty: 90 TABLET | Refills: 5 | Status: SHIPPED | OUTPATIENT
Start: 2022-05-05

## 2022-05-05 RX ORDER — FENOFIBRATE 145 MG/1
145 TABLET, COATED ORAL DAILY
Qty: 90 TABLET | Refills: 3 | Status: SHIPPED | OUTPATIENT
Start: 2022-05-05

## 2022-05-05 RX ORDER — OMEPRAZOLE 40 MG/1
40 CAPSULE, DELAYED RELEASE ORAL DAILY
Qty: 90 CAPSULE | Refills: 3 | Status: SHIPPED | OUTPATIENT
Start: 2022-05-05

## 2022-05-05 RX ORDER — MECLIZINE HYDROCHLORIDE 25 MG/1
25 TABLET ORAL EVERY 8 HOURS SCHEDULED
Qty: 270 TABLET | Refills: 3 | Status: SHIPPED | OUTPATIENT
Start: 2022-05-05

## 2022-05-05 RX ORDER — ROSUVASTATIN CALCIUM 40 MG/1
40 TABLET, COATED ORAL DAILY
Qty: 90 TABLET | Refills: 3 | Status: SHIPPED | OUTPATIENT
Start: 2022-05-05

## 2022-05-05 NOTE — ASSESSMENT & PLAN NOTE
Continue Lexapro -doing well on this
Lab Results   Component Value Date    HGBA1C 6 3 (H) 04/26/2022   on Metformin - well controlled - repeat A1c in 6 months
On Flecainide and Cardizem and Xarelto  Seeing Cardiology
On Omeprazole
On Tramadol as needed
Taking Meclizine prn
[TextBox_4] : Pt here for follow up Covid 19. Still c/o SOB with exertion most notable when bending over. Cough resolved but notes increased secretions. No longer on albuterol/ benzonatate. No change in symptoms since last visit. \par \par \par pleural plaques  on CT  Worked for [employer]\par Never smoker\par \par Patient here for f/u of PFTs and CT scan. \par

## 2022-05-05 NOTE — PATIENT INSTRUCTIONS
Medicare Preventive Visit Patient Instructions  Thank you for completing your Welcome to Medicare Visit or Medicare Annual Wellness Visit today  Your next wellness visit will be due in one year (5/6/2023)  The screening/preventive services that you may require over the next 5-10 years are detailed below  Some tests may not apply to you based off risk factors and/or age  Screening tests ordered at today's visit but not completed yet may show as past due  Also, please note that scanned in results may not display below  Preventive Screenings:  Service Recommendations Previous Testing/Comments   Colorectal Cancer Screening  · Colonoscopy    · Fecal Occult Blood Test (FOBT)/Fecal Immunochemical Test (FIT)  · Fecal DNA/Cologuard Test  · Flexible Sigmoidoscopy Age: 54-65 years old   Colonoscopy: every 10 years (May be performed more frequently if at higher risk)  OR  FOBT/FIT: every 1 year  OR  Cologuard: every 3 years  OR  Sigmoidoscopy: every 5 years  Screening may be recommended earlier than age 48 if at higher risk for colorectal cancer  Also, an individualized decision between you and your healthcare provider will decide whether screening between the ages of 74-80 would be appropriate   Colonoscopy: 08/29/2018  FOBT/FIT: Not on file  Cologuard: Not on file  Sigmoidoscopy: Not on file    Screening Current     Prostate Cancer Screening Individualized decision between patient and health care provider in men between ages of 53-78   Medicare will cover every 12 months beginning on the day after your 50th birthday PSA: 0 5 ng/mL     Risks and Benefits Discussed  Due for PSA     Hepatitis C Screening Once for adults born between 1945 and 1965  More frequently in patients at high risk for Hepatitis C Hep C Antibody: 04/19/2021    Screening Current   Diabetes Screening 1-2 times per year if you're at risk for diabetes or have pre-diabetes Fasting glucose: 100 mg/dL   A1C: 6 3 %    Screening Not Indicated  History Diabetes  Due for Blood Glucose   Cholesterol Screening Once every 5 years if you don't have a lipid disorder  May order more often based on risk factors  Lipid panel: 04/26/2022    Screening Not Indicated  History Lipid Disorder  Due for Lipid Panel      Other Preventive Screenings Covered by Medicare:  1  Abdominal Aortic Aneurysm (AAA) Screening: covered once if your at risk  You're considered to be at risk if you have a family history of AAA or a male between the age of 73-68 who smoking at least 100 cigarettes in your lifetime  2  Lung Cancer Screening: covers low dose CT scan once per year if you meet all of the following conditions: (1) Age 50-69; (2) No signs or symptoms of lung cancer; (3) Current smoker or have quit smoking within the last 15 years; (4) You have a tobacco smoking history of at least 30 pack years (packs per day x number of years you smoked); (5) You get a written order from a healthcare provider  3  Glaucoma Screening: covered annually if you're considered high risk: (1) You have diabetes OR (2) Family history of glaucoma OR (3)  aged 48 and older OR (3)  American aged 72 and older  3  Osteoporosis Screening: covered every 2 years if you meet one of the following conditions: (1) Have a vertebral abnormality; (2) On glucocorticoid therapy for more than 3 months; (3) Have primary hyperparathyroidism; (4) On osteoporosis medications and need to assess response to drug therapy  5  HIV Screening: covered annually if you're between the age of 12-76  Also covered annually if you are younger than 13 and older than 72 with risk factors for HIV infection  For pregnant patients, it is covered up to 3 times per pregnancy      Immunizations:  Immunization Recommendations   Influenza Vaccine Annual influenza vaccination during flu season is recommended for all persons aged >= 6 months who do not have contraindications   Pneumococcal Vaccine (Prevnar and Pneumovax)  * Prevnar = PCV13  * Pneumovax = PPSV23 Adults 25-60 years old: 1-3 doses may be recommended based on certain risk factors  Adults 72 years old: Prevnar (PCV13) vaccine recommended followed by Pneumovax (PPSV23) vaccine  If already received PPSV23 since turning 65, then PCV13 recommended at least one year after PPSV23 dose  Hepatitis B Vaccine 3 dose series if at intermediate or high risk (ex: diabetes, end stage renal disease, liver disease)   Tetanus (Td) Vaccine - COST NOT COVERED BY MEDICARE PART B Following completion of primary series, a booster dose should be given every 10 years to maintain immunity against tetanus  Td may also be given as tetanus wound prophylaxis  Tdap Vaccine - COST NOT COVERED BY MEDICARE PART B Recommended at least once for all adults  For pregnant patients, recommended with each pregnancy  Shingles Vaccine (Shingrix) - COST NOT COVERED BY MEDICARE PART B  2 shot series recommended in those aged 48 and above     Health Maintenance Due:      Topic Date Due    Colorectal Cancer Screening  08/29/2023    Hepatitis C Screening  Completed     Immunizations Due:  There are no preventive care reminders to display for this patient  Advance Directives   What are advance directives? Advance directives are legal documents that state your wishes and plans for medical care  These plans are made ahead of time in case you lose your ability to make decisions for yourself  Advance directives can apply to any medical decision, such as the treatments you want, and if you want to donate organs  What are the types of advance directives? There are many types of advance directives, and each state has rules about how to use them  You may choose a combination of any of the following:  · Living will: This is a written record of the treatment you want  You can also choose which treatments you do not want, which to limit, and which to stop at a certain time   This includes surgery, medicine, IV fluid, and tube feedings  · Durable power of  for healthcare [facility]): This is a written record that states who you want to make healthcare choices for you when you are unable to make them for yourself  This person, called a proxy, is usually a family member or a friend  You may choose more than 1 proxy  · Do not resuscitate (DNR) order:  A DNR order is used in case your heart stops beating or you stop breathing  It is a request not to have certain forms of treatment, such as CPR  A DNR order may be included in other types of advance directives  · Medical directive: This covers the care that you want if you are in a coma, near death, or unable to make decisions for yourself  You can list the treatments you want for each condition  Treatment may include pain medicine, surgery, blood transfusions, dialysis, IV or tube feedings, and a ventilator (breathing machine)  · Values history: This document has questions about your views, beliefs, and how you feel and think about life  This information can help others choose the care that you would choose  Why are advance directives important? An advance directive helps you control your care  Although spoken wishes may be used, it is better to have your wishes written down  Spoken wishes can be misunderstood, or not followed  Treatments may be given even if you do not want them  An advance directive may make it easier for your family to make difficult choices about your care  Weight Management   Why it is important to manage your weight:  Being overweight increases your risk of health conditions such as heart disease, high blood pressure, type 2 diabetes, and certain types of cancer  It can also increase your risk for osteoarthritis, sleep apnea, and other respiratory problems  Aim for a slow, steady weight loss  Even a small amount of weight loss can lower your risk of health problems    How to lose weight safely:  A safe and healthy way to lose weight is to eat fewer calories and get regular exercise  You can lose up about 1 pound a week by decreasing the number of calories you eat by 500 calories each day  Healthy meal plan for weight management:  A healthy meal plan includes a variety of foods, contains fewer calories, and helps you stay healthy  A healthy meal plan includes the following:  · Eat whole-grain foods more often  A healthy meal plan should contain fiber  Fiber is the part of grains, fruits, and vegetables that is not broken down by your body  Whole-grain foods are healthy and provide extra fiber in your diet  Some examples of whole-grain foods are whole-wheat breads and pastas, oatmeal, brown rice, and bulgur  · Eat a variety of vegetables every day  Include dark, leafy greens such as spinach, kale, gonzalez greens, and mustard greens  Eat yellow and orange vegetables such as carrots, sweet potatoes, and winter squash  · Eat a variety of fruits every day  Choose fresh or canned fruit (canned in its own juice or light syrup) instead of juice  Fruit juice has very little or no fiber  · Eat low-fat dairy foods  Drink fat-free (skim) milk or 1% milk  Eat fat-free yogurt and low-fat cottage cheese  Try low-fat cheeses such as mozzarella and other reduced-fat cheeses  · Choose meat and other protein foods that are low in fat  Choose beans or other legumes such as split peas or lentils  Choose fish, skinless poultry (chicken or turkey), or lean cuts of red meat (beef or pork)  Before you cook meat or poultry, cut off any visible fat  · Use less fat and oil  Try baking foods instead of frying them  Add less fat, such as margarine, sour cream, regular salad dressing and mayonnaise to foods  Eat fewer high-fat foods  Some examples of high-fat foods include french fries, doughnuts, ice cream, and cakes  · Eat fewer sweets  Limit foods and drinks that are high in sugar  This includes candy, cookies, regular soda, and sweetened drinks    Exercise:  Exercise at least 30 minutes per day on most days of the week  Some examples of exercise include walking, biking, dancing, and swimming  You can also fit in more physical activity by taking the stairs instead of the elevator or parking farther away from stores  Ask your healthcare provider about the best exercise plan for you  Narcotic (Opioid) Safety    Use narcotics safely:  · Take prescribed narcotics exactly as directed  · Do not give narcotics to others or take narcotics that belong to someone else  · Do not mix narcotics without medicines or alcohol  · Do not drive or operate heavy machinery after you take the narcotic  · Monitor for side effects and notify your healthcare provider if you experienced side effects such as nausea, sleepiness, itching, or trouble thinking clearly  Manage constipation:    Constipation is the most common side effect of narcotic medicine  Constipation is when you have hard, dry bowel movements, or you go longer than usual between bowel movements  Tell your healthcare provider about all changes in your bowel movements while you are taking narcotics  He or she may recommend laxative medicine to help you have a bowel movement  He or she may also change the kind of narcotic you are taking, or change when you take it  The following are more ways you can prevent or relieve constipation:    · Drink liquids as directed  You may need to drink extra liquids to help soften and move your bowels  Ask how much liquid to drink each day and which liquids are best for you  · Eat high-fiber foods  This may help decrease constipation by adding bulk to your bowel movements  High-fiber foods include fruits, vegetables, whole-grain breads and cereals, and beans  Your healthcare provider or dietitian can help you create a high-fiber meal plan  Your provider may also recommend a fiber supplement if you cannot get enough fiber from food  · Exercise regularly    Regular physical activity can help stimulate your intestines  Walking is a good exercise to prevent or relieve constipation  Ask which exercises are best for you  · Schedule a time each day to have a bowel movement  This may help train your body to have regular bowel movements  Bend forward while you are on the toilet to help move the bowel movement out  Sit on the toilet for at least 10 minutes, even if you do not have a bowel movement  Store narcotics safely:   · Store narcotics where others cannot easily get them  Keep them in a locked cabinet or secure area  Do not  keep them in a purse or other bag you carry with you  A person may be looking for something else and find the narcotics  · Make sure narcotics are stored out of the reach of children  A child can easily overdose on narcotics  Narcotics may look like candy to a small child  The best way to dispose of narcotics: The laws vary by country and area  In the [place], the best way is to return the narcotics through a take-back program  This program is offered by the [employer]  The following are options for using the program:  · Take the narcotics to a [facility] collection site  The site is often a law enforcement center  Call your local law enforcement center for scheduled take-back days in your area  You will be given information on where to go if the collection site is in a different location  · Take the narcotics to an approved pharmacy or hospital   A pharmacy or hospital may be set up as a collection site  You will need to ask if it is a [facility] collection site if you were not directed there  A pharmacy or doctor's office may not be able to take back narcotics unless it is a [facility]ICA site  · Use a mail-back system  This means you are given containers to put the narcotics into  You will then mail them in the containers  · Use a take-back drop box  This is a place to leave the narcotics at any time  People and animals will not be able to get into the box   Your local law enforcement agency can tell you where to find a drop box in your area  Other ways to manage pain:   · Ask your healthcare provider about non-narcotic medicines to control pain  Nonprescription medicines include NSAIDs (such as ibuprofen) and acetaminophen  Prescription medicines include muscle relaxers, antidepressants, and steroids  · Pain may be managed without any medicines  Some ways to relieve pain include massage, aromatherapy, or meditation  Physical or occupational therapy may also help  For more information:   · Drug Enforcement Administration  [facility] [address]  Phone: 3- 240 - 851-8142  Web Address: [facility]/drug_disposal/    · [address]  and Drug Administration  [facility] , [address] [facility]  Phone: 0- 953 - 203-2208  Web Address: http://[employer]/     © Copyright [employer] 2018 Information is for End User's use only and may not be sold, redistributed or otherwise used for commercial purposes   All illustrations and images included in CareNotes® are the copyrighted property of A D A M , Inc  or [address] [place] [address] [facility]

## 2022-05-05 NOTE — TELEPHONE ENCOUNTER
----- Message from Jose De Jesus Rojas MD sent at 5/5/2022  8:15 AM EDT -----  Regarding: eye exam  05/05/22 8:16 AM    Hello, our patient Ayla Bonilla has had Diabetic Eye Exam completed/performed  Please assist in updating the patient chart by pulling the document from the Media Tab  The date of service is November 2021       Thank you,  Jose De Jesus Rojas MD  [facility]

## 2022-05-05 NOTE — PROGRESS NOTES
Assessment and Plan:     Problem List Items Addressed This Visit        Digestive    Gastroesophageal reflux disease     On Omeprazole         Relevant Medications    omeprazole (PriLOSEC) 40 MG capsule       Endocrine    Controlled type 2 diabetes mellitus (HCC)       Lab Results   Component Value Date    HGBA1C 6 3 (H) 04/26/2022   on Metformin - well controlled - repeat A1c in 6 months         Relevant Medications    metFORMIN (GLUCOPHAGE) 500 mg tablet    Other Relevant Orders    Hemoglobin A1C    Microalbumin / creatinine urine ratio    Microalbumin / creatinine urine ratio       Cardiovascular and Mediastinum    Atrial fibrillation (HCC)     On Flecainide and Cardizem and Xarelto  Seeing Cardiology         Relevant Medications    fenofibrate (TRICOR) 145 mg tablet    Other Relevant Orders    CBC and differential       Other    Chronic vertigo     Taking Meclizine prn         Relevant Medications    meclizine (ANTIVERT) 25 mg tablet    Continuous opioid dependence (HCC)     On Tramadol as needed         Mild episode of recurrent major depressive disorder ([address] )     Continue Lexapro -doing well on this         Relevant Medications    escitalopram (LEXAPRO) 10 mg tablet      Other Visit Diagnoses     High cholesterol    -  Primary    Relevant Medications    fenofibrate (TRICOR) 145 mg tablet    rosuvastatin (CRESTOR) 40 MG tablet    Other Relevant Orders    Comprehensive metabolic panel    Lipid Panel with Direct LDL reflex    Medicare annual wellness visit, subsequent            BMI Counseling: Body mass index is 32 39 kg/m²  The BMI is above normal  Nutrition recommendations include moderation in carbohydrate intake  Exercise recommendations include exercising 3-5 times per week  No pharmacotherapy was ordered  Rationale for BMI follow-up plan is due to patient being overweight or obese         Preventive health issues were discussed with patient, and age appropriate screening tests were ordered as noted in patient's After Visit Summary  Personalized health advice and appropriate referrals for health education or preventive services given if needed, as noted in patient's After Visit Summary  History of Present Illness:     Patient presents for Medicare Annual Wellness visit    Patient Care Team:  Parminder Atwood MD as PCP - General (Family Medicine)  MD Steve Angeles MD Lennon Challenger, MD as Endoscopist     Problem List:     Patient Active Problem List   Diagnosis    Anxiety and depression    Benign essential hypertension    Controlled type 2 diabetes mellitus ([facility] )    Atrial fibrillation ([facility] )    Mixed hyperlipidemia    Chronic anticoagulation    Gastroesophageal reflux disease    Chronic vertigo    Class 1 obesity due to excess calories with serious comorbidity and body mass index (BMI) of 32 0 to 32 9 in adult    Diastasis recti    BENITEZ (dyspnea on exertion)    Throat clearing    Nonallergic rhinitis    Ocular migraine    Continuous opioid dependence ([place] [address] )    Mild episode of recurrent major depressive disorder ([facility] )      Past Medical and Surgical History:     Past Medical History:   Diagnosis Date    Arthritis     Atrial fibrillation ([facility] )     Candidiasis, cutaneous     Colon, diverticulosis     Depression     Diabetes mellitus ([place] [address] )     Diverticulosis     GERD (gastroesophageal reflux disease)     Hyperlipidemia     Hypertension     Internal hemorrhoids     Irregular heart beat     aFIB    Pneumonia     Shortness of breath     Sleep apnea     uses cpap    Vertigo of central origin      Past Surgical History:   Procedure Laterality Date    CARDIAC ELECTROPHYSIOLOGY MAPPING AND ABLATION      COLONOSCOPY      EGD AND COLONOSCOPY      [place] COLONOSCOPY FLX DX W/COLLJ SPEC WHEN PFRMD N/A 8/29/2018    Procedure: COLONOSCOPY;  Surgeon: Saud Parsons MD;  Location: [facility];   Service: Gastroenterology    [place] ESOPHAGOGASTRODUODENOSCOPY TRANSORAL DIAGNOSTIC N/A 10/10/2017    Procedure: ESOPHAGOGASTRODUODENOSCOPY (EGD); Surgeon: Grey Shea MD;  Location: [facility];   Service: Gastroenterology    TONSILLECTOMY        Family History:     Family History   Problem Relation Age of Onset    Other Mother         CABG,Hepatic Disorder    Coronary artery disease Mother     Other Father         CABG    Diabetes Father     Cancer Family         Gastrointestinal      Social History:     Social History     Socioeconomic History    Marital status: /Civil Union     Spouse name: None    Number of children: None    Years of education: None    Highest education level: None   Occupational History    None   Tobacco Use    Smoking status: Former Smoker     Packs/day: 1 00     Years: 12 00     Pack years: 12 00     Types: Cigarettes     Start date: 1962     Quit date: 10/10/1974     Years since quittin 6    Smokeless tobacco: Never Used   Vaping Use    Vaping Use: Never used   Substance and Sexual Activity    Alcohol use: Yes     Comment: RARE    Drug use: No    Sexual activity: Not Currently   Other Topics Concern    None   Social History Narrative    Under stress     Social Determinants of Health     Financial Resource Strain: Not on file   Food Insecurity: Not on file   Transportation Needs: Not on file   Physical Activity: Not on file   Stress: Not on file   Social Connections: Not on file   Intimate Partner Violence: Not on file   Housing Stability: Not on file      Medications and Allergies:     Current Outpatient Medications   Medication Sig Dispense Refill    diltiazem (CARDIZEM) 120 MG tablet Take 1 tablet (120 mg total) by mouth every 12 (twelve) hours 180 tablet 3    escitalopram (LEXAPRO) 10 mg tablet Take 1 tablet (10 mg total) by mouth daily 90 tablet 5    fenofibrate (TRICOR) 145 mg tablet Take 1 tablet (145 mg total) by mouth daily 90 tablet 3    flecainide (TAMBOCOR) 100 mg tablet Take 100 mg by mouth 2 (two) times a day      glucose blood test strip Test daily 100 each 0    metFORMIN (GLUCOPHAGE) 500 mg tablet Take 1 tablet (500 mg total) by mouth 2 (two) times a day with meals 180 tablet 3    omeprazole (PriLOSEC) 40 MG capsule Take 1 capsule (40 mg total) by mouth daily 90 capsule 3    rosuvastatin (CRESTOR) 40 MG tablet Take 1 tablet (40 mg total) by mouth daily 90 tablet 3    traMADol (ULTRAM) 50 mg tablet Take 1 tablet (50 mg total) by mouth every 6 (six) hours as needed for moderate pain 90 tablet 1    Xarelto 20 MG tablet Take 1 tablet (20 mg total) by mouth daily with breakfast 90 tablet 3    meclizine (ANTIVERT) 25 mg tablet Take 1 tablet (25 mg total) by mouth every 8 (eight) hours 270 tablet 3     No current facility-administered medications for this visit  No Known Allergies   Immunizations:     Immunization History   Administered Date(s) Administered    COVID-19 MODERNA VACC 0 5 ML IM 03/16/2021, 04/13/2021, 11/03/2021    INFLUENZA 09/25/2014, 08/16/2016, 08/17/2017, 08/14/2018, 08/31/2021    Influenza Injectable, MDCK, Preservative Free, Quadrivalent, 0 5 mL 08/30/2019    Influenza, injectable, quadrivalent, preservative free 0 5 mL 08/14/2018, 09/03/2020    Influenza, seasonal, injectable 10/10/2013, 09/01/2016    Influenza, seasonal, injectable, preservative free 09/16/2015    Pneumococcal Conjugate 13-Valent 05/06/2016    Pneumococcal Polysaccharide PPV23 1955, 08/17/2017    Tdap 1955, 05/06/2016    Zoster 1955, 05/06/2016    Zoster Vaccine Recombinant 09/12/2019, 02/12/2020      Health Maintenance:         Topic Date Due    Colorectal Cancer Screening  08/29/2023    Hepatitis C Screening  Completed     There are no preventive care reminders to display for this patient     Medicare Health Risk Assessment:     /82 (BP Location: Left arm, Patient Position: Sitting, Cuff Size: Large)   Pulse 59   Temp 98 9 °F (37 2 °C)   Ht 5' 8" (1 727 m)   Wt 96 6 kg (213 lb)   SpO2 98% BMI 32 39 kg/m²      Daisha Swain is here for his Subsequent Wellness visit  Last Medicare Wellness visit information reviewed, patient interviewed and updates made to the record today  Health Risk Assessment:   Patient rates overall health as good  Patient feels that their physical health rating is same  Patient is very satisfied with their life  Eyesight was rated as same  Hearing was rated as same  Patient feels that their emotional and mental health rating is same  Patients states they are never, rarely angry  Patient states they are never, rarely unusually tired/fatigued  Pain experienced in the last 7 days has been some  Patient's pain rating has been 3/10  Patient states that he has experienced no weight loss or gain in last 6 months  Wears Hearing Aides B/L    Arthritis pains    Fall Risk Screening: In the past year, patient has experienced: no history of falling in past year      Home Safety:  Patient does not have trouble with stairs inside or outside of their home  Patient has working smoke alarms and has working carbon monoxide detector  Home safety hazards include: none  Nutrition:   Current diet is Regular and Diabetic  Medications:   Patient is currently taking over-the-counter supplements  OTC medications include: see medication list  Patient is able to manage medications  Activities of Daily Living (ADLs)/Instrumental Activities of Daily Living (IADLs):   Walk and transfer into and out of bed and chair?: Yes  Dress and groom yourself?: Yes    Bathe or shower yourself?: Yes    Feed yourself?  Yes  Do your laundry/housekeeping?: Yes  Manage your money, pay your bills and track your expenses?: Yes  Make your own meals?: Yes    Do your own shopping?: Yes    Previous Hospitalizations:   Any hospitalizations or ED visits within the last 12 months?: No      Advance Care Planning:   Living will: Yes    Advanced directive: Yes      Cognitive Screening:   Provider or family/friend/caregiver concerned regarding cognition?: No    PREVENTIVE SCREENINGS      Cardiovascular Screening:    General: Screening Not Indicated, History Lipid Disorder, Risks and Benefits Discussed and Screening Current      Diabetes Screening:     General: Screening Not Indicated, History Diabetes, Risks and Benefits Discussed and Screening Current      Colorectal Cancer Screening:     General: Screening Current      Prostate Cancer Screening:    General: Risks and Benefits Discussed    Due for: PSA      Osteoporosis Screening:    General: Screening Not Indicated      Abdominal Aortic Aneurysm (AAA) Screening:    Risk factors include: age between 73-69 yo and tobacco use        General: Risks and Benefits Discussed and Screening Current      Lung Cancer Screening:     General: Screening Not Indicated      Hepatitis C Screening:    General: Screening Current    Screening, Brief Intervention, and Referral to Treatment (SBIRT)    Screening  Typical number of drinks in a day: 0  Typical number of drinks in a week: 0  Interpretation: Low risk drinking behavior  Single Item Drug Screening:  How often have you used an illegal drug (including marijuana) or a prescription medication for non-medical reasons in the past year? never    Single Item Drug Screen Score: 0  Interpretation: Negative screen for possible drug use disorder    Brief Intervention  Alcohol & drug use screenings were reviewed  No concerns regarding substance use disorder identified  Review of Current Opioid Use    Opioid Risk Tool (ORT) Interpretation: Complete Opioid Risk Tool (ORT)    Other Counseling Topics:   Regular weightbearing exercise         Saida Lopez MD

## 2022-05-05 NOTE — PROGRESS NOTES
Assessment/Plan:     Problem List Items Addressed This Visit        Digestive    Gastroesophageal reflux disease     On Omeprazole         Relevant Medications    omeprazole (PriLOSEC) 40 MG capsule       Endocrine    Controlled type 2 diabetes mellitus (HCC)       Lab Results   Component Value Date    HGBA1C 6 3 (H) 04/26/2022   on Metformin - well controlled - repeat A1c in 6 months         Relevant Medications    metFORMIN (GLUCOPHAGE) 500 mg tablet    Other Relevant Orders    Hemoglobin A1C    Microalbumin / creatinine urine ratio    Microalbumin / creatinine urine ratio       Cardiovascular and Mediastinum    Atrial fibrillation (HCC)     On Flecainide and Cardizem and Xarelto  Seeing Cardiology         Relevant Medications    fenofibrate (TRICOR) 145 mg tablet    Other Relevant Orders    CBC and differential       Other    Chronic vertigo     Taking Meclizine prn         Relevant Medications    meclizine (ANTIVERT) 25 mg tablet    Continuous opioid dependence (HCC)     On Tramadol as needed         Mild episode of recurrent major depressive disorder ([address] )     Continue Lexapro -doing well on this         Relevant Medications    escitalopram (LEXAPRO) 10 mg tablet      Other Visit Diagnoses     High cholesterol    -  Primary    Relevant Medications    fenofibrate (TRICOR) 145 mg tablet    rosuvastatin (CRESTOR) 40 MG tablet    Other Relevant Orders    Comprehensive metabolic panel    Lipid Panel with Direct LDL reflex    Medicare annual wellness visit, subsequent                Subjective:      Patient ID: Anil Obrien is a 79 y o  male  Here for a check up / lab review  A Fib - he sees cardiology - states when he eats he gets symptoms like fluttering in his chest   GERD- on Omeprazole - no active symptoms  DM - A1c 6 3%  Does not check sugars regularly    Lipids controlled on statin and fenofibrate  Depression states he is doing well on Lexapro      The following portions of the patient's history were reviewed and updated as appropriate:   Past Medical History:  He has a past medical history of Arthritis, Atrial fibrillation ([facility] ), Candidiasis, cutaneous, Colon, diverticulosis, Depression, Diabetes mellitus ([facility] ), Diverticulosis, GERD (gastroesophageal reflux disease), Hyperlipidemia, Hypertension, Internal hemorrhoids, Irregular heart beat, Pneumonia, Shortness of breath, Sleep apnea, and Vertigo of central origin  ,  _______________________________________________________________________  Medical Problems:  does not have any pertinent problems on file ,  _______________________________________________________________________  Past Surgical History:   has a past surgical history that includes Tonsillectomy; EGD AND COLONOSCOPY; Colonoscopy; pr esophagogastroduodenoscopy transoral diagnostic (N/A, 10/10/2017); pr colonoscopy flx dx w/collj spec when pfrmd (N/A, 8/29/2018); and Cardiac electrophysiology mapping and ablation  ,  _______________________________________________________________________  Family History:  family history includes Cancer in his family; Coronary artery disease in his mother; Diabetes in his father; Other in his father and mother ,  _______________________________________________________________________  Social History:   reports that he quit smoking about 47 years ago  His smoking use included cigarettes  He started smoking about 60 years ago  He has a 12 00 pack-year smoking history  He has never used smokeless tobacco  He reports current alcohol use  He reports that he does not use drugs  ,  _______________________________________________________________________  Allergies:  has No Known Allergies     _______________________________________________________________________  Current Outpatient Medications   Medication Sig Dispense Refill    diltiazem (CARDIZEM) 120 MG tablet Take 1 tablet (120 mg total) by mouth every 12 (twelve) hours 180 tablet 3    escitalopram (LEXAPRO) 10 mg tablet Take 1 tablet (10 mg total) by mouth daily 90 tablet 5    fenofibrate (TRICOR) 145 mg tablet Take 1 tablet (145 mg total) by mouth daily 90 tablet 3    flecainide (TAMBOCOR) 100 mg tablet Take 100 mg by mouth 2 (two) times a day      glucose blood test strip Test daily 100 each 0    metFORMIN (GLUCOPHAGE) 500 mg tablet Take 1 tablet (500 mg total) by mouth 2 (two) times a day with meals 180 tablet 3    omeprazole (PriLOSEC) 40 MG capsule Take 1 capsule (40 mg total) by mouth daily 90 capsule 3    rosuvastatin (CRESTOR) 40 MG tablet Take 1 tablet (40 mg total) by mouth daily 90 tablet 3    traMADol (ULTRAM) 50 mg tablet Take 1 tablet (50 mg total) by mouth every 6 (six) hours as needed for moderate pain 90 tablet 1    Xarelto 20 MG tablet Take 1 tablet (20 mg total) by mouth daily with breakfast 90 tablet 3    meclizine (ANTIVERT) 25 mg tablet Take 1 tablet (25 mg total) by mouth every 8 (eight) hours 270 tablet 3     No current facility-administered medications for this visit      _______________________________________________________________________  Review of Systems   Constitutional: Negative for activity change, appetite change, fatigue and unexpected weight change  HENT: Positive for hearing loss  Respiratory: Negative for chest tightness and shortness of breath  Cardiovascular: Positive for palpitations  Negative for chest pain and leg swelling  Gastrointestinal: Negative for abdominal pain  Neurological: Positive for dizziness  Negative for headaches  Objective:  Vitals:    05/05/22 0757   BP: 126/82   BP Location: Left arm   Patient Position: Sitting   Cuff Size: Large   Pulse: 59   Temp: 98 9 °F (37 2 °C)   SpO2: 98%   Weight: 96 6 kg (213 lb)   Height: 5' 8" (1 727 m)     Body mass index is 32 39 kg/m²  Physical Exam  Vitals and nursing note reviewed  Constitutional:       General: He is not in acute distress  Appearance: Normal appearance  He is well-developed   He is not diaphoretic  HENT:      Head: Normocephalic and atraumatic  Cardiovascular:      Rate and Rhythm: Normal rate and regular rhythm  Heart sounds: Normal heart sounds  No murmur heard  No friction rub  No gallop  Pulmonary:      Effort: Pulmonary effort is normal  No respiratory distress  Breath sounds: Normal breath sounds  No wheezing or rales  Chest:      Chest wall: No tenderness  Musculoskeletal:         General: No swelling  Right lower leg: No edema  Left lower leg: No edema  Neurological:      Mental Status: He is alert and oriented to person, place, and time  Psychiatric:         Mood and Affect: Mood normal          Behavior: Behavior normal          Thought Content:  Thought content normal          Judgment: Judgment normal

## 2022-05-13 NOTE — TELEPHONE ENCOUNTER
Upon review of the In Basket request we were able to locate, review, and update the patient chart as requested for Diabetic Eye Exam  Reached out to provider to get a complete report with photos which show eyes dilated as well  Now linked in HM  Any additional questions or concerns should be emailed to the Practice Liaisons via [email or web address] email, please do not reply via In Basket      Thank you  Amilcar Brian MA

## 2022-06-06 ENCOUNTER — OFFICE VISIT (OUTPATIENT)
Dept: FAMILY MEDICINE CLINIC | Facility: CLINIC | Age: 67
End: 2022-06-06
Payer: COMMERCIAL

## 2022-06-06 VITALS
TEMPERATURE: 97.7 F | DIASTOLIC BLOOD PRESSURE: 76 MMHG | HEART RATE: 74 BPM | OXYGEN SATURATION: 98 % | SYSTOLIC BLOOD PRESSURE: 140 MMHG

## 2022-06-06 DIAGNOSIS — R42 CHRONIC VERTIGO: Primary | ICD-10-CM

## 2022-06-06 DIAGNOSIS — H93.13 TINNITUS AURIUM, BILATERAL: ICD-10-CM

## 2022-06-06 PROCEDURE — 1160F RVW MEDS BY RX/DR IN RCRD: CPT | Performed by: PHYSICIAN ASSISTANT

## 2022-06-06 PROCEDURE — 99214 OFFICE O/P EST MOD 30 MIN: CPT | Performed by: PHYSICIAN ASSISTANT

## 2022-06-06 PROCEDURE — 1036F TOBACCO NON-USER: CPT | Performed by: PHYSICIAN ASSISTANT

## 2022-06-06 RX ORDER — ONDANSETRON 4 MG/1
4 TABLET, FILM COATED ORAL EVERY 8 HOURS PRN
Qty: 20 TABLET | Refills: 0 | Status: SHIPPED | OUTPATIENT
Start: 2022-06-06

## 2022-06-06 NOTE — PROGRESS NOTES
Assessment/Plan:       Problem List Items Addressed This Visit        Other    Chronic vertigo - Primary    Relevant Medications    ondansetron (ZOFRAN) 4 mg tablet    Other Relevant Orders    Ambulatory Referral to Otolaryngology      Other Visit Diagnoses     Tinnitus aurium, bilateral        Relevant Orders    Ambulatory Referral to Otolaryngology        acute on chronic vertigo, chronically taking meclizine  Will add prn zofran for sx management but recommend pt follow up with ENT for more definitive tx given chronicity and also to explore alternative diagnoses  His neuro exam is normal today  Sx consistent with vertigo  maintaining NSR today  Follow up prn     Subjective:      Patient ID: Ila Tran is a 79 y o  male  Pt presents for acute on chronic vertigo  He shares he has had vertigo for 20 years  He has been taking meclizine chronically  He shares over the past 1-2 days he has had a lot of dizziness with head movements  Pt does have a hx of afib  Denies palpitations, CP, SOB, syncope  He also has a hx of chronic tinnitus  The following portions of the patient's history were reviewed and updated as appropriate:   He  has a past medical history of Arthritis, Atrial fibrillation ([facility] [address] ), Candidiasis, cutaneous, Colon, diverticulosis, Depression, Diabetes mellitus ([address] ), Diverticulosis, GERD (gastroesophageal reflux disease), Hyperlipidemia, Hypertension, Internal hemorrhoids, Irregular heart beat, Pneumonia, Shortness of breath, Sleep apnea, and Vertigo of central origin    He   Patient Active Problem List    Diagnosis Date Noted    Continuous opioid dependence ([facility] ) 05/05/2022    Mild episode of recurrent major depressive disorder ([facility] ) 05/05/2022    Nonallergic rhinitis 05/11/2021    Ocular migraine 05/11/2021    BENITEZ (dyspnea on exertion) 04/29/2021    Throat clearing 04/29/2021    Gastroesophageal reflux disease 12/30/2020    Chronic vertigo 12/30/2020    Class 1 obesity due to excess calories with serious comorbidity and body mass index (BMI) of 32 0 to 32 9 in adult 12/30/2020    Diastasis recti 12/30/2020    Chronic anticoagulation 08/24/2020    Atrial fibrillation ([facility] ) 05/26/2020    Mixed hyperlipidemia 05/26/2020    Anxiety and depression 11/17/2017    Controlled type 2 diabetes mellitus ([facility] ) 07/07/2017    Benign essential hypertension 03/28/2014     He  has a past surgical history that includes Tonsillectomy; EGD AND COLONOSCOPY; Colonoscopy; pr esophagogastroduodenoscopy transoral diagnostic (N/A, 10/10/2017); pr colonoscopy flx dx w/collj spec when pfrmd (N/A, 8/29/2018); and Cardiac electrophysiology mapping and ablation  His family history includes Cancer in his family; Coronary artery disease in his mother; Diabetes in his father; Other in his father and mother  He  reports that he quit smoking about 47 years ago  His smoking use included cigarettes  He started smoking about 60 years ago  He has a 12 00 pack-year smoking history  He has never used smokeless tobacco  He reports current alcohol use  He reports that he does not use drugs    Current Outpatient Medications   Medication Sig Dispense Refill    ondansetron (ZOFRAN) 4 mg tablet Take 1 tablet (4 mg total) by mouth every 8 (eight) hours as needed for nausea or vomiting 20 tablet 0    diltiazem (CARDIZEM) 120 MG tablet Take 1 tablet (120 mg total) by mouth every 12 (twelve) hours 180 tablet 3    escitalopram (LEXAPRO) 10 mg tablet Take 1 tablet (10 mg total) by mouth daily 90 tablet 5    fenofibrate (TRICOR) 145 mg tablet Take 1 tablet (145 mg total) by mouth daily 90 tablet 3    flecainide (TAMBOCOR) 100 mg tablet Take 100 mg by mouth 2 (two) times a day      glucose blood test strip Test daily 100 each 0    meclizine (ANTIVERT) 25 mg tablet Take 1 tablet (25 mg total) by mouth every 8 (eight) hours 270 tablet 3    metFORMIN (GLUCOPHAGE) 500 mg tablet Take 1 tablet (500 mg total) by mouth 2 (two) times a day with meals 180 tablet 3    omeprazole (PriLOSEC) 40 MG capsule Take 1 capsule (40 mg total) by mouth daily 90 capsule 3    rosuvastatin (CRESTOR) 40 MG tablet Take 1 tablet (40 mg total) by mouth daily 90 tablet 3    traMADol (ULTRAM) 50 mg tablet Take 1 tablet (50 mg total) by mouth every 6 (six) hours as needed for moderate pain 90 tablet 1    Xarelto 20 MG tablet Take 1 tablet (20 mg total) by mouth daily with breakfast 90 tablet 3     No current facility-administered medications for this visit  Current Outpatient Medications on File Prior to Visit   Medication Sig    diltiazem (CARDIZEM) 120 MG tablet Take 1 tablet (120 mg total) by mouth every 12 (twelve) hours    escitalopram (LEXAPRO) 10 mg tablet Take 1 tablet (10 mg total) by mouth daily    fenofibrate (TRICOR) 145 mg tablet Take 1 tablet (145 mg total) by mouth daily    flecainide (TAMBOCOR) 100 mg tablet Take 100 mg by mouth 2 (two) times a day    glucose blood test strip Test daily    meclizine (ANTIVERT) 25 mg tablet Take 1 tablet (25 mg total) by mouth every 8 (eight) hours    metFORMIN (GLUCOPHAGE) 500 mg tablet Take 1 tablet (500 mg total) by mouth 2 (two) times a day with meals    omeprazole (PriLOSEC) 40 MG capsule Take 1 capsule (40 mg total) by mouth daily    rosuvastatin (CRESTOR) 40 MG tablet Take 1 tablet (40 mg total) by mouth daily    traMADol (ULTRAM) 50 mg tablet Take 1 tablet (50 mg total) by mouth every 6 (six) hours as needed for moderate pain    Xarelto 20 MG tablet Take 1 tablet (20 mg total) by mouth daily with breakfast     No current facility-administered medications on file prior to visit  He has No Known Allergies       Review of Systems   Constitutional: Negative for chills, fatigue and fever  HENT: Negative for congestion, ear pain, hearing loss, nosebleeds, postnasal drip, rhinorrhea, sinus pressure, sinus pain, sneezing and sore throat      Eyes: Negative for pain, discharge, itching and visual disturbance  Respiratory: Negative for cough, chest tightness, shortness of breath and wheezing  Cardiovascular: Negative for chest pain, palpitations and leg swelling  Gastrointestinal: Negative for abdominal pain, blood in stool, constipation, diarrhea, nausea and vomiting  Genitourinary: Negative for frequency and urgency  Neurological: Positive for dizziness  Negative for light-headedness and numbness  Objective:      /76 (BP Location: Left arm, Patient Position: Sitting, Cuff Size: Large)   Pulse 74   Temp 97 7 °F (36 5 °C)   SpO2 98%          Physical Exam  Vitals and nursing note reviewed  Constitutional:       General: He is not in acute distress  Appearance: Normal appearance  He is not ill-appearing  HENT:      Head: Normocephalic and atraumatic  Ears:      Comments: Pt wears hearing aids     Nose: Nose normal       Mouth/Throat:      Mouth: Mucous membranes are moist       Pharynx: Oropharynx is clear  No oropharyngeal exudate or posterior oropharyngeal erythema  Eyes:      Pupils: Pupils are equal, round, and reactive to light  Cardiovascular:      Rate and Rhythm: Normal rate and regular rhythm  Heart sounds: Normal heart sounds  Comments: NSR today  Pulmonary:      Effort: Pulmonary effort is normal  No respiratory distress  Breath sounds: Normal breath sounds  No wheezing, rhonchi or rales  Musculoskeletal:         General: Normal range of motion  Cervical back: Normal range of motion and neck supple  Skin:     General: Skin is warm and dry  Neurological:      General: No focal deficit present  Mental Status: He is alert and oriented to person, place, and time  Mental status is at baseline  Cranial Nerves: No cranial nerve deficit  Sensory: No sensory deficit  Motor: No weakness        Coordination: Coordination normal       Deep Tendon Reflexes: Reflexes normal    Psychiatric:         Mood and Affect: Mood and affect normal

## 2022-06-07 ENCOUNTER — VBI (OUTPATIENT)
Dept: ADMINISTRATIVE | Facility: OTHER | Age: 67
End: 2022-06-07

## 2022-07-12 ENCOUNTER — TELEPHONE (OUTPATIENT)
Dept: PHYSICAL THERAPY | Age: 67
End: 2022-07-12

## 2022-07-12 NOTE — TELEPHONE ENCOUNTER
Called patient 4:00  LM on machine to get him scheduled for vertigo treatment    [address] [employer] for call back

## 2022-07-14 ENCOUNTER — EVALUATION (OUTPATIENT)
Dept: PHYSICAL THERAPY | Age: 67
End: 2022-07-14
Payer: COMMERCIAL

## 2022-07-14 DIAGNOSIS — H81.13 BENIGN PAROXYSMAL VERTIGO OF BOTH EARS: ICD-10-CM

## 2022-07-14 DIAGNOSIS — R42 CHRONIC VERTIGO: Primary | ICD-10-CM

## 2022-07-14 PROCEDURE — 95992 CANALITH REPOSITIONING PROC: CPT | Performed by: PHYSICAL THERAPIST

## 2022-07-14 PROCEDURE — 97162 PT EVAL MOD COMPLEX 30 MIN: CPT | Performed by: PHYSICAL THERAPIST

## 2022-07-14 PROCEDURE — 97110 THERAPEUTIC EXERCISES: CPT | Performed by: PHYSICAL THERAPIST

## 2022-07-14 NOTE — PROGRESS NOTES
PT Evaluation     Today's date: 2022  Patient name: Lois Newberry  : 1955  MRN: [id number]  Referring provider: More Patterson PA-C  Dx:   Encounter Diagnosis     ICD-10-CM    1  Chronic vertigo  R42    2  Benign paroxysmal vertigo of both ears  H81 13        Start Time: 1150  Stop Time: 1252  Total time in clinic (min): 62 minutes    Assessment  Assessment details: Lois Newberry is a 79 y o  male who presents with dizziness  Due to these impairments, Patient has difficulty performing a/iadls and recreational activities  Patient's clinical presentation is consistent with their referring diagnosis of vertigo  Patient has had a very long history of vertigo and takes meclizine several times a day daily  He recently changed brand of medicine and feeling more dizzy  Patient was negative for roll test but positive for both [employer]  He was treated with an Epley maneuver and was not symptomatic on 2nd time  Patient currently off medication due to having test next week  Patient was instructed in post treatment precautions  Patient would benefit from skilled physical therapy to address their aforementioned impairments, improve their level of function and to improve their overall quality of life  Impairments: activity intolerance and lacks appropriate home exercise program  Other impairment: izziness    Symptom irritability: lowUnderstanding of Dx/Px/POC: good   Prognosis: good    Goals    Goals  STG 1-2 weeks  1  Decrease c/o dizziness/vertigo by 50%  2  transfers with ease from supine-sit- stand without symptoms of dizziness  LTG 2-4 weeks  1  Resolve symptoms of vertigo  2  Independent HEP for home Epley maneuver/self management  3  DHI<10%  4  Pt able to move in/out of bed and roll over without onset of symptoms  5  Able to return to prior level of function without medication for dizziness  Goals  LT-6 WEEKS  1  Patient to be independent with a/iadls    2  Increase functional activities for leisure and home activities to previous LOF  3  Independent with HEP and/or fitness program     Plan  Patient would benefit from: skilled physical therapy  Planned modality interventions: cryotherapy, thermotherapy: hydrocollator packs and unattended electrical stimulation  Planned therapy interventions: activity modification, behavior modification, home exercise program, neuromuscular re-education, patient education and canalith repositioning  Frequency: 2-3x week  Duration in weeks: 12  Plan of Care beginning date: 7/14/2022  Plan of Care expiration date: 10/14/2022  Treatment plan discussed with: patient        Subjective Evaluation    History of Present Illness  Mechanism of injury: Patient reports he has had many years of vertigo and has been taking antivert for years  He recently filled the prescription and it is a different make and he feels it is making him worse  He stated he had MRI years ago and after seeing ENT he his referred for VNG next week  He reports he is unable to sleep on left side, sees a shadow without his reading glasses, has tinnitus, wears bilateral hearing aides  Recurrent probem    Pain  No pain reported  Progression: no change    Social Support  Lives with: spouse    Employment status: not working    Diagnostic Tests    FCE comments: Has VNG next weekPatient Goals  Patient goals for therapy: independence with ADLs/IADLs and return to sport/leisure activities  Patient goal: no dizziness        Objective     Concurrent Complaints  Positive for tinnitus, visual change and hearing loss  Negative for headaches, nausea/motion sickness, memory loss, aural fullness, poor concentration and peripheral neuropathy    Static Posture     Head  Forward  Shoulders  Rounded  Comments  BP:138/63  HR 57:      Active Range of Motion   Cervical/Thoracic Spine       Cervical    Flexion:  WFL  Extension:  WFL  Left rotation:  WFL  Right rotation:  [facility]    Ambulation Weight-Bearing Status   Assistive device used: none    Ambulation: Level Surfaces   Ambulation without assistive device: independent    Functional Assessment        Single Leg Stance   Left: 2 seconds  Right: 2 seconds  Neuro Exam:     Dizziness  Positive for disequilibrium, vertigo and diplopia  Negative for oscillopsia, motion sickness, light-headedness, rocking or swaying and floating or swimming  Exacerbating factors  Positive for bending over, rolling in bed, looking up, turning head, supine to/from sitting and optokinetic movement  Negative for walking and walking in busy environment  Headaches   Patient reports headaches: No      Cervical exam   Modified VBI   Left: asymptomatic  Right: asymptomatic    Oculomotor exam   Oculomotor ROM: WNL  Resting nystagmus: not present   Gaze holding nystagmus: present left and present right  Smooth pursuits: within normal limits  Vertical saccades: normal  Horizontal saccades: normal  Head thrust: left abnormal and right abnormal    Positional testing   Hattie-Hallpike   Left posterior canal: symptomatic and upbeating  Right posterior canal: symptomatic and upbeating  Duration: 15 (seconds)  Hattie-Hallpike comment: 8  Roll test   Left horizontal canal: WNL  Right horizontal canal: WNL      Flowsheet Rows    Flowsheet Row Most Recent Value   PT/OT G-Codes    Current Score 52   Projected Score 0             Precautions: HTN, DM, GERD, A-fib, SOB, sleep apnea, B hearing aides        Manuals 7/14            R Epley 1x            L Epley 1x                                      Neuro Re-Ed                                                                                                        Ther Ex             HEP 10                                                                                                       Ther Activity                                       Gait Training                                       Modalities

## 2022-07-18 ENCOUNTER — OFFICE VISIT (OUTPATIENT)
Dept: PHYSICAL THERAPY | Age: 67
End: 2022-07-18
Payer: COMMERCIAL

## 2022-07-18 DIAGNOSIS — H81.13 BENIGN PAROXYSMAL VERTIGO OF BOTH EARS: ICD-10-CM

## 2022-07-18 DIAGNOSIS — R42 CHRONIC VERTIGO: Primary | ICD-10-CM

## 2022-07-18 PROCEDURE — 97110 THERAPEUTIC EXERCISES: CPT | Performed by: PHYSICAL THERAPIST

## 2022-07-18 PROCEDURE — 95992 CANALITH REPOSITIONING PROC: CPT | Performed by: PHYSICAL THERAPIST

## 2022-07-18 PROCEDURE — 97112 NEUROMUSCULAR REEDUCATION: CPT | Performed by: PHYSICAL THERAPIST

## 2022-07-18 NOTE — PROGRESS NOTES
Daily Note     Today's date: 2022  Patient name: Portia Obrien  : 1955  MRN: [id number]  Referring provider: Landry Dorsey PA-C  Dx:   Encounter Diagnosis     ICD-10-CM    1  Chronic vertigo  R42    2  Benign paroxysmal vertigo of both ears  H81 13        Start Time: 915  Stop Time: 1000  Total time in clinic (min): 45 minutes    Subjective: Patient reports he was good for about a day and half after last session  He feels foggy in the top of his head but not dizzy today  Objective: See treatment diary below      Assessment: Tolerated treatment well  Patient would benefit from continued PT  Patient has some nystagmus in eyes during Epley but denies any symptoms  He states he has a hard time to keep eyes focused  Added VOR and eye exercises today and for HEP  Patient remains medicine free and needs to until next week due to testing to be done  He states he is not to the point of needing it yet  Will see patient as needed for next 2 weeks  Plan: Continue per plan of care  Precautions: HTN, DM, GERD, A-fib, SOB, sleep apnea, B hearing aides        Manuals            R Epley 1x 1x           L Epley 1x 1x                                     Neuro Re-Ed             Tandem forward/back  2x           NBOS DLS rotations w/ball behind  2'                                                                            Ther Ex             HEP 10 10'           VOR head turns  1x           Eye ex's lat, hozontal  1x                        Lateral and horizontal pegs sitting  1x ea                                                  Ther Activity                                       Gait Training                                       Modalities

## 2022-07-24 DIAGNOSIS — E11.9 CONTROLLED TYPE 2 DIABETES MELLITUS WITHOUT COMPLICATION, WITHOUT LONG-TERM CURRENT USE OF INSULIN (HCC): ICD-10-CM

## 2022-07-26 ENCOUNTER — OFFICE VISIT (OUTPATIENT)
Dept: AUDIOLOGY | Age: 67
End: 2022-07-26
Payer: COMMERCIAL

## 2022-07-26 DIAGNOSIS — R42 CHRONIC VERTIGO: ICD-10-CM

## 2022-07-26 DIAGNOSIS — R42 DIZZINESS: ICD-10-CM

## 2022-07-26 PROCEDURE — 92537 CALORIC VSTBLR TEST W/REC: CPT | Performed by: AUDIOLOGIST

## 2022-07-26 PROCEDURE — 92567 TYMPANOMETRY: CPT | Performed by: AUDIOLOGIST

## 2022-07-26 PROCEDURE — 92540 BASIC VESTIBULAR EVALUATION: CPT | Performed by: AUDIOLOGIST

## 2022-07-26 NOTE — PROGRESS NOTES
Videonystagmography (VNG) Evaluation    Name:  Eli Dick  :  1955  Age:  79 y o  Date of Evaluation: 22     History: Dizziness  Reason for visit: Eli Dick is seen today at the request of Dr Mesfin Evans for an evaluation of balance  Patient complains of approximately 30+ years of dizziness  Patient stated he experiences a spinning sensation for about 30 sec to a minute and then will feel off for hours to day  Patient noted the spinning can be brought on with head movements up, down, and to the left, and body position changes, when laying down or sitting up  Patient stated he recently had the Eply Maneuver approximately two weeks ago and noticed an improvement, and noted he completed 2 VT visits  Tympanometry:   Right: Type A - normal middle ear pressure and compliance   Left: Type A - normal middle ear pressure and compliance    Oculomotor battery:    Gaze:          Right: Normal        Left: Normal         Up: Normal        Down: Normal      Tracking: Normal    Saccades: Normal     Optokinetic: Abnormal Gain - @ 40 dps Right     Positioning/Positionals:     [place] Adriana:    Right: Negative  , Nystagmus noted : LB 3° and DB 3°    Left: Negative  , Nystagmus noted : DB 3°      * No subjective dizziness noted       Positionals:     Sitting: Normal    Supine: Normal    Head Right: LB 3°    Head Left: RB 5° and DB 3°    Body Right: LB 3°    Body Left[de-identified] RB 6°    *Ageotropic positional nystagmus noted  Calorics:     Bithermal Caloric Irrigation: Abnormal Unilateral Weakness Left - 35%      Results:   Abnormal left peripheral findings         Recommendations: Consider vestibular physcial therapy evaluation and rehabilitation through [facility]  Follow up with managing physician      Makayla Billings , [facility]  Clinical Audiologist

## 2022-09-02 ENCOUNTER — OFFICE VISIT (OUTPATIENT)
Dept: PHYSICAL THERAPY | Age: 67
End: 2022-09-02
Payer: COMMERCIAL

## 2022-09-02 DIAGNOSIS — H81.13 BENIGN PAROXYSMAL VERTIGO OF BOTH EARS: ICD-10-CM

## 2022-09-02 DIAGNOSIS — R42 CHRONIC VERTIGO: Primary | ICD-10-CM

## 2022-09-02 PROCEDURE — 97110 THERAPEUTIC EXERCISES: CPT | Performed by: PHYSICAL THERAPIST

## 2022-09-02 PROCEDURE — 97112 NEUROMUSCULAR REEDUCATION: CPT | Performed by: PHYSICAL THERAPIST

## 2022-09-02 PROCEDURE — 95992 CANALITH REPOSITIONING PROC: CPT | Performed by: PHYSICAL THERAPIST

## 2022-09-02 NOTE — PROGRESS NOTES
PT Re-Evaluation     Today's date: 2022  Patient name: Renetta Cordero  : 1955  MRN: [id number]  Referring provider: Loretta Tomas PA-C  Dx:   Encounter Diagnosis     ICD-10-CM    1  Chronic vertigo  R42    2  Benign paroxysmal vertigo of both ears  H81 13        Start Time: 0800  Stop Time: 0900  Total time in clinic (min): 60 minutes    Assessment  Assessment details: Renetta Cordero is a 79 y o  male who presents with dizziness  Due to these impairments, Patient has difficulty performing a/iadls and recreational activities  Patient's clinical presentation is consistent with their referring diagnosis of vertigo  Patient has had a very long history of vertigo  He started PT 6 weeks ago with 2 sessions  He had VNG test and referred to continue PT  Right Neita Cooler was negative however when turned head to left symptoms started  Patient was positive for left Neita Cooler  Duration was 20 seconds  He was treated with an Epley maneuver and was not symptomatic on 2nd time  Patient felt better after session  Reviewed eye exercises with patient and added near/far distance eye exercises to HEP, also reviewed self maneuver as needed for HEP  Patient would benefit from skilled physical therapy to address their aforementioned impairments, improve their level of function and to improve their overall quality of life  Impairments: activity intolerance and lacks appropriate home exercise program  Other impairment: dizziness    Symptom irritability: lowUnderstanding of Dx/Px/POC: good   Prognosis: good    Goals  Goals  STG 1-2 weeks  1  Decrease c/o dizziness/vertigo by 50%  Progressing towards  Ongoing goal    2  transfers with ease from supine-sit- stand without symptoms of dizziness  Patient has good and bad days and able to manage accordingly  LTG 2-4 weeks  1  Resolve symptoms of vertigo  Goal not met  Ongoing goal    2  Independent HEP for home Epley maneuver/self management   Reviewed technique  Ongoing goal    3  DHI<10%  Progressing towards  Ongoing goal    4  Pt able to move in/out of bed and roll over without onset of symptoms  Progressing towards  Ongoing goal    5  Able to return to prior level of function without medication for dizziness  Goal not met  Ongoing goal    Goals  LT-6 WEEKS  1  Patient to be independent with a/iadls  Ongoing goal    2  Increase functional activities for leisure and home activities to previous LOF  Ongoing goal    3  Independent with HEP and/or fitness program  Ongoing goal      Plan  Patient would benefit from: skilled physical therapy  Planned modality interventions: cryotherapy, thermotherapy: hydrocollator packs and unattended electrical stimulation  Planned therapy interventions: activity modification, behavior modification, home exercise program, neuromuscular re-education, patient education and canalith repositioning  Frequency: 2-3x week  Duration in weeks: 12  Plan of Care beginning date: 2022  Plan of Care expiration date: 10/14/2022  Treatment plan discussed with: patient        Subjective Evaluation    History of Present Illness  Mechanism of injury: Patient reports he has had many years of vertigo and has been taking antivert for years  He recently filled the prescription and it is a different make and he feels it is making him worse  He stated he had MRI years ago and after seeing ENT he his referred for VNG next week  He reports he is unable to sleep on left side, sees a shadow without his reading glasses, has tinnitus, wears bilateral hearing aides  22: Patient reports he had his VNG test and showed left ear weakness and to continue PT  He notices 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 and has his head tilted for a while it almost feels like he notices the "crystals" moving  Overall not bad but did notice he is better after PT treatments             Recurrent probem    Pain  No pain reported  Progression: improved    Social Support  Lives with: spouse    Employment status: not working    Diagnostic Tests    FCE comments: VNG: results in chartPatient Goals  Patient goals for therapy: independence with ADLs/IADLs and return to sport/leisure activities  Patient goal: no dizziness        Objective     Concurrent Complaints  Positive for tinnitus and hearing loss  Negative for headaches, nausea/motion sickness, visual change, memory loss, aural fullness, poor concentration and peripheral neuropathy    Static Posture     Head  Forward  Shoulders  Rounded  Active Range of Motion   Cervical/Thoracic Spine       Cervical    Flexion:  WFL  Extension:  WFL  Left rotation:  WFL  Right rotation:  [facility]    Ambulation   Weight-Bearing Status   Assistive device used: none    Ambulation: Level Surfaces   Ambulation without assistive device: independent    Functional Assessment        Single Leg Stance   Left: 3 seconds  Right: 3 seconds  Neuro Exam:     Dizziness  Positive for disequilibrium and vertigo  Negative for oscillopsia, motion sickness, light-headedness, rocking or swaying, diplopia and floating or swimming  Exacerbating factors  Positive for bending over, rolling in bed, looking up, supine to/from sitting and walking in busy environment  Negative for walking, turning head and optokinetic movement       Headaches   Patient reports headaches: No      Oculomotor exam   Oculomotor ROM: WNL  Resting nystagmus: not present   Gaze holding nystagmus: not present left  and not present right  Smooth pursuits: within normal limits  Vertical saccades: normal  Horizontal saccades: normal  Head thrust: left abnormal and right abnormal    Positional testing   Hattie-Hallpike   Left posterior canal: symptomatic  Right posterior canal: symptomatic  Duration: 20 (seconds)      Flowsheet Rows    Flowsheet Row Most Recent Value   PT/OT G-Codes    Current Score 57   Projected Score 0             Precautions: HTN, DM, GERD, A-fib, SOB, sleep apnea, B hearing aides       Manuals 7/14 7/18 9/2          R Epley 1x 1x 1x          L Epley 1x 1x 2x                                    Neuro Re-Ed             Tandem forward/back  2x 2x          NBOS DLS rotations w/ball behind  2'                                                                            Ther Ex             HEP 10 10' 10'          VOR head turns  1x 2x          Eye ex's lat, hozontal  1x 2x                       Lateral and horizontal pegs sitting  1x ea                                                  Ther Activity                                       Gait Training                                       Modalities

## 2022-09-02 NOTE — PROGRESS NOTES
Daily Note     Today's date: 2022  Patient name: Portia Obrien  : 1955  MRN: [id number]  Referring provider: Landry Dorsey PA-C  Dx:   Encounter Diagnosis     ICD-10-CM    1  Chronic vertigo  R42    2  Benign paroxysmal vertigo of both ears  H81 13                   Subjective: ***      Objective: See treatment diary below      Assessment: Tolerated treatment {Tolerated treatment :}  Patient {assessment:}      Plan: {PLAN:[id number]}     Precautions: HTN, DM, GERD, A-fib, SOB, sleep apnea, B hearing aides        Manuals  9          R Epley 1x 1x           L Epley 1x 1x                                     Neuro Re-Ed             Tandem forward/back  2x           NBOS DLS rotations w/ball behind  2'                                                                            Ther Ex             HEP 10 10'           VOR head turns  1x           Eye ex's lat, hozontal  1x                        Lateral and horizontal pegs sitting  1x ea                                                  Ther Activity                                       Gait Training                                       Modalities

## 2022-09-30 NOTE — PROGRESS NOTES
Patient has not returned for the past month for further PT  He was given a HEP for eye exercises  D/c PT at this time

## 2022-10-12 PROBLEM — R09.89 THROAT CLEARING: Status: RESOLVED | Noted: 2021-04-29 | Resolved: 2022-10-12

## 2022-10-29 ENCOUNTER — APPOINTMENT (OUTPATIENT)
Dept: LAB | Facility: CLINIC | Age: 67
End: 2022-10-29

## 2022-10-29 DIAGNOSIS — I48.91 ATRIAL FIBRILLATION, UNSPECIFIED TYPE (HCC): ICD-10-CM

## 2022-10-29 DIAGNOSIS — E78.00 HIGH CHOLESTEROL: ICD-10-CM

## 2022-10-29 DIAGNOSIS — E11.9 CONTROLLED TYPE 2 DIABETES MELLITUS WITHOUT COMPLICATION, WITHOUT LONG-TERM CURRENT USE OF INSULIN (HCC): ICD-10-CM

## 2022-10-29 LAB
ALBUMIN SERPL BCP-MCNC: 3.7 G/DL (ref 3.5–5)
ALP SERPL-CCNC: 37 U/L (ref 46–116)
ALT SERPL W P-5'-P-CCNC: 23 U/L (ref 12–78)
ANION GAP SERPL CALCULATED.3IONS-SCNC: 6 MMOL/L (ref 4–13)
AST SERPL W P-5'-P-CCNC: 20 U/L (ref 5–45)
BASOPHILS # BLD AUTO: 0.08 THOUSANDS/ÂΜL (ref 0–0.1)
BASOPHILS NFR BLD AUTO: 1 % (ref 0–1)
BILIRUB SERPL-MCNC: 0.4 MG/DL (ref 0.2–1)
BUN SERPL-MCNC: 20 MG/DL (ref 5–25)
CALCIUM SERPL-MCNC: 9.2 MG/DL (ref 8.3–10.1)
CHLORIDE SERPL-SCNC: 110 MMOL/L (ref 96–108)
CHOLEST SERPL-MCNC: 146 MG/DL
CO2 SERPL-SCNC: 24 MMOL/L (ref 21–32)
CREAT SERPL-MCNC: 1.21 MG/DL (ref 0.6–1.3)
EOSINOPHIL # BLD AUTO: 0.18 THOUSAND/ÂΜL (ref 0–0.61)
EOSINOPHIL NFR BLD AUTO: 3 % (ref 0–6)
ERYTHROCYTE [DISTWIDTH] IN BLOOD BY AUTOMATED COUNT: 14.2 % (ref 11.6–15.1)
EST. AVERAGE GLUCOSE BLD GHB EST-MCNC: 137 MG/DL
GFR SERPL CREATININE-BSD FRML MDRD: 61 ML/MIN/1.73SQ M
GLUCOSE P FAST SERPL-MCNC: 103 MG/DL (ref 65–99)
HBA1C MFR BLD: 6.4 %
HCT VFR BLD AUTO: 41.8 % (ref 36.5–49.3)
HDLC SERPL-MCNC: 40 MG/DL
HGB BLD-MCNC: 13.4 G/DL (ref 12–17)
IMM GRANULOCYTES # BLD AUTO: 0.02 THOUSAND/UL (ref 0–0.2)
IMM GRANULOCYTES NFR BLD AUTO: 0 % (ref 0–2)
LDLC SERPL CALC-MCNC: 85 MG/DL (ref 0–100)
LYMPHOCYTES # BLD AUTO: 2.02 THOUSANDS/ÂΜL (ref 0.6–4.47)
LYMPHOCYTES NFR BLD AUTO: 34 % (ref 14–44)
MCH RBC QN AUTO: 27.8 PG (ref 26.8–34.3)
MCHC RBC AUTO-ENTMCNC: 32.1 G/DL (ref 31.4–37.4)
MCV RBC AUTO: 87 FL (ref 82–98)
MONOCYTES # BLD AUTO: 0.48 THOUSAND/ÂΜL (ref 0.17–1.22)
MONOCYTES NFR BLD AUTO: 8 % (ref 4–12)
NEUTROPHILS # BLD AUTO: 3.24 THOUSANDS/ÂΜL (ref 1.85–7.62)
NEUTS SEG NFR BLD AUTO: 54 % (ref 43–75)
NRBC BLD AUTO-RTO: 0 /100 WBCS
PLATELET # BLD AUTO: 284 THOUSANDS/UL (ref 149–390)
PMV BLD AUTO: 10.2 FL (ref 8.9–12.7)
POTASSIUM SERPL-SCNC: 4.1 MMOL/L (ref 3.5–5.3)
PROT SERPL-MCNC: 7.3 G/DL (ref 6.4–8.4)
RBC # BLD AUTO: 4.82 MILLION/UL (ref 3.88–5.62)
SODIUM SERPL-SCNC: 140 MMOL/L (ref 135–147)
TRIGL SERPL-MCNC: 107 MG/DL
WBC # BLD AUTO: 6.02 THOUSAND/UL (ref 4.31–10.16)

## 2022-11-07 ENCOUNTER — OFFICE VISIT (OUTPATIENT)
Dept: FAMILY MEDICINE CLINIC | Facility: CLINIC | Age: 67
End: 2022-11-07

## 2022-11-07 VITALS
TEMPERATURE: 99 F | BODY MASS INDEX: 32.28 KG/M2 | HEART RATE: 64 BPM | OXYGEN SATURATION: 98 % | WEIGHT: 213 LBS | HEIGHT: 68 IN | DIASTOLIC BLOOD PRESSURE: 64 MMHG | SYSTOLIC BLOOD PRESSURE: 136 MMHG

## 2022-11-07 DIAGNOSIS — E66.09 CLASS 1 OBESITY DUE TO EXCESS CALORIES WITH SERIOUS COMORBIDITY AND BODY MASS INDEX (BMI) OF 32.0 TO 32.9 IN ADULT: ICD-10-CM

## 2022-11-07 DIAGNOSIS — E11.9 CONTROLLED TYPE 2 DIABETES MELLITUS WITHOUT COMPLICATION, WITHOUT LONG-TERM CURRENT USE OF INSULIN (HCC): Primary | ICD-10-CM

## 2022-11-07 DIAGNOSIS — K21.9 GASTROESOPHAGEAL REFLUX DISEASE: ICD-10-CM

## 2022-11-07 DIAGNOSIS — R42 CHRONIC VERTIGO: ICD-10-CM

## 2022-11-07 DIAGNOSIS — I48.91 ATRIAL FIBRILLATION, UNSPECIFIED TYPE (HCC): ICD-10-CM

## 2022-11-07 DIAGNOSIS — E78.00 HIGH CHOLESTEROL: ICD-10-CM

## 2022-11-07 DIAGNOSIS — Z23 NEED FOR PNEUMOCOCCAL VACCINATION: ICD-10-CM

## 2022-11-07 PROBLEM — R06.09 DOE (DYSPNEA ON EXERTION): Status: RESOLVED | Noted: 2021-04-29 | Resolved: 2022-11-07

## 2022-11-07 RX ORDER — FLECAINIDE ACETATE 100 MG/1
100 TABLET ORAL 2 TIMES DAILY
Status: CANCELLED | OUTPATIENT
Start: 2022-11-07

## 2022-11-07 RX ORDER — ROSUVASTATIN CALCIUM 40 MG/1
40 TABLET, COATED ORAL DAILY
Qty: 90 TABLET | Refills: 3 | Status: SHIPPED | OUTPATIENT
Start: 2022-11-07

## 2022-11-07 RX ORDER — FENOFIBRATE 145 MG/1
145 TABLET, COATED ORAL DAILY
Qty: 90 TABLET | Refills: 3 | Status: SHIPPED | OUTPATIENT
Start: 2022-11-07

## 2022-11-07 RX ORDER — MECLIZINE HYDROCHLORIDE 25 MG/1
25 TABLET ORAL EVERY 8 HOURS SCHEDULED
Qty: 270 TABLET | Refills: 3 | Status: SHIPPED | OUTPATIENT
Start: 2022-11-07

## 2022-11-07 RX ORDER — OMEPRAZOLE 40 MG/1
40 CAPSULE, DELAYED RELEASE ORAL DAILY
Qty: 90 CAPSULE | Refills: 3 | Status: SHIPPED | OUTPATIENT
Start: 2022-11-07

## 2022-11-07 NOTE — ASSESSMENT & PLAN NOTE
BMI Counseling: Body mass index is 32 39 kg/m²  The BMI is above normal  Exercise recommendations include exercising 3-5 times per week

## 2022-11-07 NOTE — ASSESSMENT & PLAN NOTE
Lab Results   Component Value Date    HGBA1C 6 4 (H) 10/29/2022   well controlled on Metformin  Repeat A1c in 6 months

## 2022-11-07 NOTE — PROGRESS NOTES
Assessment/Plan:         Problem List Items Addressed This Visit        Digestive    Gastroesophageal reflux disease     Stable on Prilosec 40 mg         Relevant Medications    omeprazole (PriLOSEC) 40 MG capsule       Endocrine    Controlled type 2 diabetes mellitus ([address] ) - Primary       Lab Results   Component Value Date    HGBA1C 6 4 (H) 10/29/2022   well controlled on Metformin  Repeat A1c in 6 months         Relevant Medications    metFORMIN (GLUCOPHAGE) 500 mg tablet    Other Relevant Orders    Hemoglobin A1C    Lipid Panel with Direct LDL reflex    Comprehensive metabolic panel    Microalbumin / creatinine urine ratio       Cardiovascular and Mediastinum    Atrial fibrillation (HCC)     Controlled on Flecainide, Cardizem  Continue Xarelto   Following with Cardiology         Relevant Medications    fenofibrate (TRICOR) 145 mg tablet    Other Relevant Orders    Comprehensive metabolic panel    CBC and differential       Other    Chronic vertigo     Uses Meclizine as needed         Relevant Medications    meclizine (ANTIVERT) 25 mg tablet    Class 1 obesity due to excess calories with serious comorbidity and body mass index (BMI) of 32 0 to 32 9 in adult     BMI Counseling: Body mass index is 32 39 kg/m²  The BMI is above normal  Exercise recommendations include exercising 3-5 times per week  Other Visit Diagnoses     High cholesterol        Relevant Medications    fenofibrate (TRICOR) 145 mg tablet    rosuvastatin (CRESTOR) 40 MG tablet    Need for pneumococcal vaccination        Relevant Orders    Pneumococcal Conjugate Vaccine 20-valent (Pcv20) (Completed)            Subjective:      Patient ID: Marissa Solis is a 79 y o  male  Here for a check up and lab review  DM - A1c 6 4%  Not checking sugars  On Metformin  HTN - controlled  A Fib - on Flecainide  Sees Cardiology  Lipids - controlled on statin and Fenofibrate  Vertigo - takes Meclizine as needed  Obesity - weight down 2 lbs  no exercise  The following portions of the patient's history were reviewed and updated as appropriate:   Past Medical History:  He has a past medical history of Arthritis, Atrial fibrillation ([facility] [address] ), Candidiasis, cutaneous, Colon, diverticulosis, Depression, Diabetes mellitus ([facility] [address] ), Diverticulosis, GERD (gastroesophageal reflux disease), Hyperlipidemia, Hypertension, Internal hemorrhoids, Irregular heart beat, Pneumonia, Shortness of breath, Sleep apnea, and Vertigo of central origin  ,  _______________________________________________________________________  Medical Problems:  does not have any pertinent problems on file ,  _______________________________________________________________________  Past Surgical History:   has a past surgical history that includes Tonsillectomy; EGD AND COLONOSCOPY; Colonoscopy; pr esophagogastroduodenoscopy transoral diagnostic (N/A, 10/10/2017); pr colonoscopy flx dx w/collj spec when pfrmd (N/A, 8/29/2018); and Cardiac electrophysiology mapping and ablation  ,  _______________________________________________________________________  Family History:  family history includes Cancer in his family; Coronary artery disease in his mother; Diabetes in his father; Other in his father and mother ,  _______________________________________________________________________  Social History:   reports that he quit smoking about 48 years ago  His smoking use included cigarettes  He started smoking about 60 years ago  He has a 12 00 pack-year smoking history  He has never used smokeless tobacco  He reports current alcohol use  He reports that he does not use drugs  ,  _______________________________________________________________________  Allergies:  has No Known Allergies     _______________________________________________________________________  Current Outpatient Medications   Medication Sig Dispense Refill   • fenofibrate (TRICOR) 145 mg tablet Take 1 tablet (145 mg total) by mouth daily 90 tablet 3   • meclizine (ANTIVERT) 25 mg tablet Take 1 tablet (25 mg total) by mouth every 8 (eight) hours 270 tablet 3   • metFORMIN (GLUCOPHAGE) 500 mg tablet Take 1 tablet (500 mg total) by mouth 2 (two) times a day with meals 180 tablet 3   • omeprazole (PriLOSEC) 40 MG capsule Take 1 capsule (40 mg total) by mouth daily 90 capsule 3   • rosuvastatin (CRESTOR) 40 MG tablet Take 1 tablet (40 mg total) by mouth daily 90 tablet 3   • diltiazem (CARDIZEM) 120 MG tablet Take 1 tablet (120 mg total) by mouth every 12 (twelve) hours 180 tablet 3   • escitalopram (LEXAPRO) 10 mg tablet Take 1 tablet (10 mg total) by mouth daily 90 tablet 5   • flecainide (TAMBOCOR) 100 mg tablet Take 100 mg by mouth 2 (two) times a day     • glucose blood test strip Test daily 100 each 0   • ondansetron (ZOFRAN) 4 mg tablet Take 1 tablet (4 mg total) by mouth every 8 (eight) hours as needed for nausea or vomiting 20 tablet 0   • traMADol (ULTRAM) 50 mg tablet Take 1 tablet (50 mg total) by mouth every 6 (six) hours as needed for moderate pain 90 tablet 1   • Xarelto 20 MG tablet Take 1 tablet (20 mg total) by mouth daily with breakfast 90 tablet 3     No current facility-administered medications for this visit      _______________________________________________________________________  Review of Systems   Constitutional: Negative for activity change, appetite change, fatigue and unexpected weight change  Respiratory: Negative for chest tightness and shortness of breath  Cardiovascular: Negative for chest pain and leg swelling  Gastrointestinal: Negative for abdominal pain  Neurological: Negative for headaches  Objective:  Vitals:    11/07/22 0842   BP: 136/64   Pulse: 64   Temp: 99 °F (37 2 °C)   SpO2: 98%   Weight: 96 6 kg (213 lb)   Height: 5' 8" (1 727 m)     Body mass index is 32 39 kg/m²  Physical Exam  Vitals and nursing note reviewed  Constitutional:       General: He is not in acute distress  Appearance: Normal appearance  He is well-developed  He is obese  He is not diaphoretic  HENT:      Head: Normocephalic and atraumatic  Neck:      Vascular: No carotid bruit  Cardiovascular:      Rate and Rhythm: Normal rate and regular rhythm  Pulses: no weak pulses          Dorsalis pedis pulses are 2+ on the right side and 2+ on the left side  Posterior tibial pulses are 0 on the right side and 0 on the left side  Heart sounds: Normal heart sounds  No murmur heard  No friction rub  No gallop  Pulmonary:      Effort: Pulmonary effort is normal  No respiratory distress  Breath sounds: Normal breath sounds  No wheezing or rales  Chest:      Chest wall: No tenderness  Musculoskeletal:         General: No swelling  Right lower leg: No edema  Left lower leg: No edema  Feet:      Right foot:      Skin integrity: No ulcer, skin breakdown, erythema, warmth, callus or dry skin  Left foot:      Skin integrity: No ulcer, skin breakdown, erythema, warmth, callus or dry skin  Neurological:      General: No focal deficit present  Mental Status: He is alert and oriented to person, place, and time  Mental status is at baseline  Cranial Nerves: No cranial nerve deficit  Psychiatric:         Mood and Affect: Mood normal          Behavior: Behavior normal          Thought Content: Thought content normal          Judgment: Judgment normal              Diabetic Foot Exam    Patient's shoes and socks removed  Right Foot/Ankle   Right Foot Inspection  Skin Exam: skin normal and skin intact  No dry skin, no warmth, no callus, no erythema, no maceration, no abnormal color, no pre-ulcer, no ulcer and no callus  Sensory   Vibration: intact  Monofilament testing: intact    Vascular  Capillary refills: < 3 seconds  The right DP pulse is 2+  The right PT pulse is 0  Left Foot/Ankle  Left Foot Inspection  Skin Exam: skin normal and skin intact   No dry skin, no warmth, no erythema, no maceration, normal color, no pre-ulcer, no ulcer and no callus  Sensory   Vibration: intact  Monofilament testing: intact    Vascular  Capillary refills: < 3 seconds  The left DP pulse is 2+  The left PT pulse is 0  Assign Risk Category  Deformity present  No loss of protective sensation  No weak pulses  Risk: 0    BMI Counseling: Body mass index is 32 39 kg/m²  The BMI is above normal  Nutrition recommendations include decreasing overall calorie intake  Exercise recommendations include exercising 3-5 times per week

## 2023-01-15 DIAGNOSIS — E78.00 HIGH CHOLESTEROL: ICD-10-CM

## 2023-01-15 DIAGNOSIS — F32.A DEPRESSION, UNSPECIFIED DEPRESSION TYPE: ICD-10-CM

## 2023-01-16 RX ORDER — ESCITALOPRAM OXALATE 10 MG/1
TABLET ORAL
Qty: 90 TABLET | Refills: 5 | Status: SHIPPED | OUTPATIENT
Start: 2023-01-16

## 2023-01-16 RX ORDER — ROSUVASTATIN CALCIUM 40 MG/1
TABLET, COATED ORAL
Qty: 90 TABLET | Refills: 3 | Status: SHIPPED | OUTPATIENT
Start: 2023-01-16

## 2023-01-17 DIAGNOSIS — E11.9 CONTROLLED TYPE 2 DIABETES MELLITUS WITHOUT COMPLICATION, WITHOUT LONG-TERM CURRENT USE OF INSULIN (HCC): ICD-10-CM

## 2023-03-01 LAB
LEFT EYE DIABETIC RETINOPATHY: NORMAL
RIGHT EYE DIABETIC RETINOPATHY: NORMAL
SEVERITY (EYE EXAM): NORMAL

## 2023-04-22 ENCOUNTER — APPOINTMENT (OUTPATIENT)
Dept: LAB | Facility: CLINIC | Age: 68
End: 2023-04-22

## 2023-04-22 DIAGNOSIS — E11.9 CONTROLLED TYPE 2 DIABETES MELLITUS WITHOUT COMPLICATION, WITHOUT LONG-TERM CURRENT USE OF INSULIN (HCC): ICD-10-CM

## 2023-04-22 DIAGNOSIS — I48.91 ATRIAL FIBRILLATION, UNSPECIFIED TYPE (HCC): ICD-10-CM

## 2023-04-22 LAB
ALBUMIN SERPL BCP-MCNC: 3.9 G/DL (ref 3.5–5)
ALP SERPL-CCNC: 34 U/L (ref 46–116)
ALT SERPL W P-5'-P-CCNC: 25 U/L (ref 12–78)
ANION GAP SERPL CALCULATED.3IONS-SCNC: 6 MMOL/L (ref 4–13)
AST SERPL W P-5'-P-CCNC: 29 U/L (ref 5–45)
BASOPHILS # BLD AUTO: 0.08 THOUSANDS/ΜL (ref 0–0.1)
BASOPHILS NFR BLD AUTO: 1 % (ref 0–1)
BILIRUB SERPL-MCNC: 0.53 MG/DL (ref 0.2–1)
BUN SERPL-MCNC: 20 MG/DL (ref 5–25)
CALCIUM SERPL-MCNC: 9.6 MG/DL (ref 8.3–10.1)
CHLORIDE SERPL-SCNC: 111 MMOL/L (ref 96–108)
CHOLEST SERPL-MCNC: 137 MG/DL
CO2 SERPL-SCNC: 22 MMOL/L (ref 21–32)
CREAT SERPL-MCNC: 1.15 MG/DL (ref 0.6–1.3)
CREAT UR-MCNC: 193 MG/DL
EOSINOPHIL # BLD AUTO: 0.24 THOUSAND/ΜL (ref 0–0.61)
EOSINOPHIL NFR BLD AUTO: 4 % (ref 0–6)
ERYTHROCYTE [DISTWIDTH] IN BLOOD BY AUTOMATED COUNT: 14.6 % (ref 11.6–15.1)
EST. AVERAGE GLUCOSE BLD GHB EST-MCNC: 140 MG/DL
GFR SERPL CREATININE-BSD FRML MDRD: 65 ML/MIN/1.73SQ M
GLUCOSE P FAST SERPL-MCNC: 98 MG/DL (ref 65–99)
HBA1C MFR BLD: 6.5 %
HCT VFR BLD AUTO: 39.3 % (ref 36.5–49.3)
HDLC SERPL-MCNC: 40 MG/DL
HGB BLD-MCNC: 12.4 G/DL (ref 12–17)
IMM GRANULOCYTES # BLD AUTO: 0.01 THOUSAND/UL (ref 0–0.2)
IMM GRANULOCYTES NFR BLD AUTO: 0 % (ref 0–2)
LDLC SERPL CALC-MCNC: 79 MG/DL (ref 0–100)
LYMPHOCYTES # BLD AUTO: 2.06 THOUSANDS/ΜL (ref 0.6–4.47)
LYMPHOCYTES NFR BLD AUTO: 34 % (ref 14–44)
MCH RBC QN AUTO: 27.4 PG (ref 26.8–34.3)
MCHC RBC AUTO-ENTMCNC: 31.6 G/DL (ref 31.4–37.4)
MCV RBC AUTO: 87 FL (ref 82–98)
MICROALBUMIN UR-MCNC: 8 MG/L (ref 0–20)
MICROALBUMIN/CREAT 24H UR: 4 MG/G CREATININE (ref 0–30)
MONOCYTES # BLD AUTO: 0.54 THOUSAND/ΜL (ref 0.17–1.22)
MONOCYTES NFR BLD AUTO: 9 % (ref 4–12)
NEUTROPHILS # BLD AUTO: 3.16 THOUSANDS/ΜL (ref 1.85–7.62)
NEUTS SEG NFR BLD AUTO: 52 % (ref 43–75)
NRBC BLD AUTO-RTO: 0 /100 WBCS
PLATELET # BLD AUTO: 297 THOUSANDS/UL (ref 149–390)
PMV BLD AUTO: 10.7 FL (ref 8.9–12.7)
POTASSIUM SERPL-SCNC: 4.1 MMOL/L (ref 3.5–5.3)
PROT SERPL-MCNC: 6.9 G/DL (ref 6.4–8.4)
RBC # BLD AUTO: 4.53 MILLION/UL (ref 3.88–5.62)
SODIUM SERPL-SCNC: 139 MMOL/L (ref 135–147)
TRIGL SERPL-MCNC: 92 MG/DL
WBC # BLD AUTO: 6.09 THOUSAND/UL (ref 4.31–10.16)

## 2023-05-08 DIAGNOSIS — E78.00 HIGH CHOLESTEROL: ICD-10-CM

## 2023-05-08 DIAGNOSIS — I48.91 ATRIAL FIBRILLATION, UNSPECIFIED TYPE (HCC): ICD-10-CM

## 2023-05-08 RX ORDER — FENOFIBRATE 145 MG/1
TABLET, COATED ORAL
Qty: 90 TABLET | Refills: 3 | Status: SHIPPED | OUTPATIENT
Start: 2023-05-08

## 2023-05-09 ENCOUNTER — OFFICE VISIT (OUTPATIENT)
Dept: FAMILY MEDICINE CLINIC | Facility: CLINIC | Age: 68
End: 2023-05-09

## 2023-05-09 VITALS
BODY MASS INDEX: 31.98 KG/M2 | HEART RATE: 60 BPM | SYSTOLIC BLOOD PRESSURE: 124 MMHG | WEIGHT: 211 LBS | HEIGHT: 68 IN | DIASTOLIC BLOOD PRESSURE: 68 MMHG | OXYGEN SATURATION: 97 %

## 2023-05-09 DIAGNOSIS — E11.9 CONTROLLED TYPE 2 DIABETES MELLITUS WITHOUT COMPLICATION, WITHOUT LONG-TERM CURRENT USE OF INSULIN (HCC): Primary | ICD-10-CM

## 2023-05-09 DIAGNOSIS — F11.20 CONTINUOUS OPIOID DEPENDENCE (HCC): ICD-10-CM

## 2023-05-09 DIAGNOSIS — F33.0 MILD EPISODE OF RECURRENT MAJOR DEPRESSIVE DISORDER (HCC): ICD-10-CM

## 2023-05-09 DIAGNOSIS — I10 BENIGN ESSENTIAL HYPERTENSION: ICD-10-CM

## 2023-05-09 DIAGNOSIS — Z00.00 MEDICARE ANNUAL WELLNESS VISIT, SUBSEQUENT: ICD-10-CM

## 2023-05-09 DIAGNOSIS — H00.019 HORDEOLUM EXTERNUM, UNSPECIFIED LATERALITY: ICD-10-CM

## 2023-05-09 DIAGNOSIS — I48.91 ATRIAL FIBRILLATION, UNSPECIFIED TYPE (HCC): ICD-10-CM

## 2023-05-09 RX ORDER — DILTIAZEM HYDROCHLORIDE 180 MG/1
CAPSULE, COATED, EXTENDED RELEASE ORAL 2 TIMES DAILY
COMMUNITY
Start: 2023-03-09 | End: 2023-05-09

## 2023-05-09 RX ORDER — AMOXICILLIN AND CLAVULANATE POTASSIUM 500; 125 MG/1; MG/1
TABLET, FILM COATED ORAL
COMMUNITY
Start: 2023-05-02 | End: 2023-05-09 | Stop reason: ALTCHOICE

## 2023-05-09 NOTE — PATIENT INSTRUCTIONS
Medicare Preventive Visit Patient Instructions  Thank you for completing your Welcome to Medicare Visit or Medicare Annual Wellness Visit today  Your next wellness visit will be due in one year (5/9/2024)  The screening/preventive services that you may require over the next 5-10 years are detailed below  Some tests may not apply to you based off risk factors and/or age  Screening tests ordered at today's visit but not completed yet may show as past due  Also, please note that scanned in results may not display below  Preventive Screenings:  Service Recommendations Previous Testing/Comments   Colorectal Cancer Screening  · Colonoscopy    · Fecal Occult Blood Test (FOBT)/Fecal Immunochemical Test (FIT)  · Fecal DNA/Cologuard Test  · Flexible Sigmoidoscopy Age: 39-70 years old   Colonoscopy: every 10 years (May be performed more frequently if at higher risk)  OR  FOBT/FIT: every 1 year  OR  Cologuard: every 3 years  OR  Sigmoidoscopy: every 5 years  Screening may be recommended earlier than age 39 if at higher risk for colorectal cancer  Also, an individualized decision between you and your healthcare provider will decide whether screening between the ages of 74-80 would be appropriate   Colonoscopy: 08/29/2018  FOBT/FIT: Not on file  Cologuard: Not on file  Sigmoidoscopy: Not on file    Screening Current     Prostate Cancer Screening Individualized decision between patient and health care provider in men between ages of 53-78   Medicare will cover every 12 months beginning on the day after your 50th birthday PSA: 0 5 ng/mL     Risks and Benefits Discussed  Due for PSA     Hepatitis C Screening Once for adults born between 1945 and 1965  More frequently in patients at high risk for Hepatitis C Hep C Antibody: 04/19/2021    Screening Current   Diabetes Screening 1-2 times per year if you're at risk for diabetes or have pre-diabetes Fasting glucose: 98 mg/dL (4/22/2023)  A1C: 6 5 % (4/22/2023)  Screening Not Indicated  History Diabetes   Cholesterol Screening Once every 5 years if you don't have a lipid disorder  May order more often based on risk factors  Lipid panel: 04/22/2023  Screening Not Indicated  History Lipid Disorder      Other Preventive Screenings Covered by Medicare:  1  Abdominal Aortic Aneurysm (AAA) Screening: covered once if your at risk  You're considered to be at risk if you have a family history of AAA or a male between the age of 73-68 who smoking at least 100 cigarettes in your lifetime  2  Lung Cancer Screening: covers low dose CT scan once per year if you meet all of the following conditions: (1) Age 50-69; (2) No signs or symptoms of lung cancer; (3) Current smoker or have quit smoking within the last 15 years; (4) You have a tobacco smoking history of at least 20 pack years (packs per day x number of years you smoked); (5) You get a written order from a healthcare provider  3  Glaucoma Screening: covered annually if you're considered high risk: (1) You have diabetes OR (2) Family history of glaucoma OR (3)  aged 48 and older OR (3)  American aged 72 and older  3  Osteoporosis Screening: covered every 2 years if you meet one of the following conditions: (1) Have a vertebral abnormality; (2) On glucocorticoid therapy for more than 3 months; (3) Have primary hyperparathyroidism; (4) On osteoporosis medications and need to assess response to drug therapy  5  HIV Screening: covered annually if you're between the age of 12-76  Also covered annually if you are younger than 13 and older than 72 with risk factors for HIV infection  For pregnant patients, it is covered up to 3 times per pregnancy      Immunizations:  Immunization Recommendations   Influenza Vaccine Annual influenza vaccination during flu season is recommended for all persons aged >= 6 months who do not have contraindications   Pneumococcal Vaccine   * Pneumococcal conjugate vaccine = PCV13 (Prevnar 13), PCV15 (Vaxneuvance), PCV20 (Prevnar 20)  * Pneumococcal polysaccharide vaccine = PPSV23 (Pneumovax) Adults 2364 years old: 1-3 doses may be recommended based on certain risk factors  Adults 72 years old: 1-2 doses may be recommended based off what pneumonia vaccine you previously received   Hepatitis B Vaccine 3 dose series if at intermediate or high risk (ex: diabetes, end stage renal disease, liver disease)   Tetanus (Td) Vaccine - COST NOT COVERED BY MEDICARE PART B Following completion of primary series, a booster dose should be given every 10 years to maintain immunity against tetanus  Td may also be given as tetanus wound prophylaxis  Tdap Vaccine - COST NOT COVERED BY MEDICARE PART B Recommended at least once for all adults  For pregnant patients, recommended with each pregnancy  Shingles Vaccine (Shingrix) - COST NOT COVERED BY MEDICARE PART B  2 shot series recommended in those aged 48 and above     Health Maintenance Due:      Topic Date Due   • Colorectal Cancer Screening  08/29/2023   • Hepatitis C Screening  Completed     Immunizations Due:  There are no preventive care reminders to display for this patient  Advance Directives   What are advance directives? Advance directives are legal documents that state your wishes and plans for medical care  These plans are made ahead of time in case you lose your ability to make decisions for yourself  Advance directives can apply to any medical decision, such as the treatments you want, and if you want to donate organs  What are the types of advance directives? There are many types of advance directives, and each state has rules about how to use them  You may choose a combination of any of the following:  · Living will: This is a written record of the treatment you want  You can also choose which treatments you do not want, which to limit, and which to stop at a certain time  This includes surgery, medicine, IV fluid, and tube feedings     · Durable power of  for [facility]): This is a written record that states who you want to make healthcare choices for you when you are unable to make them for yourself  This person, called a proxy, is usually a family member or a friend  You may choose more than 1 proxy  · Do not resuscitate (DNR) order:  A DNR order is used in case your heart stops beating or you stop breathing  It is a request not to have certain forms of treatment, such as CPR  A DNR order may be included in other types of advance directives  · Medical directive: This covers the care that you want if you are in a coma, near death, or unable to make decisions for yourself  You can list the treatments you want for each condition  Treatment may include pain medicine, surgery, blood transfusions, dialysis, IV or tube feedings, and a ventilator (breathing machine)  · Values history: This document has questions about your views, beliefs, and how you feel and think about life  This information can help others choose the care that you would choose  Why are advance directives important? An advance directive helps you control your care  Although spoken wishes may be used, it is better to have your wishes written down  Spoken wishes can be misunderstood, or not followed  Treatments may be given even if you do not want them  An advance directive may make it easier for your family to make difficult choices about your care  Weight Management   Why it is important to manage your weight:  Being overweight increases your risk of health conditions such as heart disease, high blood pressure, type 2 diabetes, and certain types of cancer  It can also increase your risk for osteoarthritis, sleep apnea, and other respiratory problems  Aim for a slow, steady weight loss  Even a small amount of weight loss can lower your risk of health problems  How to lose weight safely:  A safe and healthy way to lose weight is to eat fewer calories and get regular exercise   You can lose up about 1 pound a week by decreasing the number of calories you eat by 500 calories each day  Healthy meal plan for weight management:  A healthy meal plan includes a variety of foods, contains fewer calories, and helps you stay healthy  A healthy meal plan includes the following:  · Eat whole-grain foods more often  A healthy meal plan should contain fiber  Fiber is the part of grains, fruits, and vegetables that is not broken down by your body  Whole-grain foods are healthy and provide extra fiber in your diet  Some examples of whole-grain foods are whole-wheat breads and pastas, oatmeal, brown rice, and bulgur  · Eat a variety of vegetables every day  Include dark, leafy greens such as spinach, kale, gonzalez greens, and mustard greens  Eat yellow and orange vegetables such as carrots, sweet potatoes, and winter squash  · Eat a variety of fruits every day  Choose fresh or canned fruit (canned in its own juice or light syrup) instead of juice  Fruit juice has very little or no fiber  · Eat low-fat dairy foods  Drink fat-free (skim) milk or 1% milk  Eat fat-free yogurt and low-fat cottage cheese  Try low-fat cheeses such as mozzarella and other reduced-fat cheeses  · Choose meat and other protein foods that are low in fat  Choose beans or other legumes such as split peas or lentils  Choose fish, skinless poultry (chicken or turkey), or lean cuts of red meat (beef or pork)  Before you cook meat or poultry, cut off any visible fat  · Use less fat and oil  Try baking foods instead of frying them  Add less fat, such as margarine, sour cream, regular salad dressing and mayonnaise to foods  Eat fewer high-fat foods  Some examples of high-fat foods include french fries, doughnuts, ice cream, and cakes  · Eat fewer sweets  Limit foods and drinks that are high in sugar  This includes candy, cookies, regular soda, and sweetened drinks    Exercise:  Exercise at least 30 minutes per day on most days of the week  Some examples of exercise include walking, biking, dancing, and swimming  You can also fit in more physical activity by taking the stairs instead of the elevator or parking farther away from stores  Ask your healthcare provider about the best exercise plan for you  Narcotic (Opioid) Safety    Use narcotics safely:  · Take prescribed narcotics exactly as directed  · Do not give narcotics to others or take narcotics that belong to someone else  · Do not mix narcotics without medicines or alcohol  · Do not drive or operate heavy machinery after you take the narcotic  · Monitor for side effects and notify your healthcare provider if you experienced side effects such as nausea, sleepiness, itching, or trouble thinking clearly  Manage constipation:    Constipation is the most common side effect of narcotic medicine  Constipation is when you have hard, dry bowel movements, or you go longer than usual between bowel movements  Tell your healthcare provider about all changes in your bowel movements while you are taking narcotics  He or she may recommend laxative medicine to help you have a bowel movement  He or she may also change the kind of narcotic you are taking, or change when you take it  The following are more ways you can prevent or relieve constipation:    · Drink liquids as directed  You may need to drink extra liquids to help soften and move your bowels  Ask how much liquid to drink each day and which liquids are best for you  · Eat high-fiber foods  This may help decrease constipation by adding bulk to your bowel movements  High-fiber foods include fruits, vegetables, whole-grain breads and cereals, and beans  Your healthcare provider or dietitian can help you create a high-fiber meal plan  Your provider may also recommend a fiber supplement if you cannot get enough fiber from food  · Exercise regularly  Regular physical activity can help stimulate your intestines   Walking is a good exercise to prevent or relieve constipation  Ask which exercises are best for you  · Schedule a time each day to have a bowel movement  This may help train your body to have regular bowel movements  Bend forward while you are on the toilet to help move the bowel movement out  Sit on the toilet for at least 10 minutes, even if you do not have a bowel movement  Store narcotics safely:   · Store narcotics where others cannot easily get them  Keep them in a locked cabinet or secure area  Do not  keep them in a purse or other bag you carry with you  A person may be looking for something else and find the narcotics  · Make sure narcotics are stored out of the reach of children  A child can easily overdose on narcotics  Narcotics may look like candy to a small child  The best way to dispose of narcotics: The laws vary by country and area  In the [place], the best way is to return the narcotics through a take-back program  This program is offered by the [employer])  The following are options for using the program:  · Take the narcotics to a [facility] collection site  The site is often a law enforcement center  Call your local law enforcement center for scheduled take-back days in your area  You will be given information on where to go if the collection site is in a different location  · Take the narcotics to an approved pharmacy or hospital   A pharmacy or hospital may be set up as a collection site  You will need to ask if it is a [facility] collection site if you were not directed there  A pharmacy or doctor's office may not be able to take back narcotics unless it is a [facility] site  · Use a mail-back system  This means you are given containers to put the narcotics into  You will then mail them in the containers  · Use a take-back drop box  This is a place to leave the narcotics at any time  People and animals will not be able to get into the box   Your local law enforcement agency can tell you where to find a drop box in your area  Other ways to manage pain:   · Ask your healthcare provider about non-narcotic medicines to control pain  Nonprescription medicines include NSAIDs (such as ibuprofen) and acetaminophen  Prescription medicines include muscle relaxers, antidepressants, and steroids  · Pain may be managed without any medicines  Some ways to relieve pain include massage, aromatherapy, or meditation  Physical or occupational therapy may also help  For more information:   · Drug Enforcement Administration  [facility] [address]  Phone: 7- 838 - 108-4703  Web Address: [facility]/drug_disposal/    · [address]  and Drug Administration  [place] [facility] [address]  Phone: 7- 728 - 393-8322  Web Address: http://[employer]/     © Copyright [employer] 2018 Information is for End User's use only and may not be sold, redistributed or otherwise used for commercial purposes   All illustrations and images included in CareNotes® are the copyrighted property of A D A M , Inc  or [address] [place] [address] [email or web address] Hospital

## 2023-05-09 NOTE — PROGRESS NOTES
Assessment and Plan:     Problem List Items Addressed This Visit        Endocrine    Controlled type 2 diabetes mellitus ([facility] ) - Primary       Lab Results   Component Value Date    HGBA1C 6 5 (H) 04/22/2023   Well controlled on Metformin          Relevant Orders    Hemoglobin A1C    Comprehensive metabolic panel    Lipid Panel with Direct LDL reflex       Cardiovascular and Mediastinum    Benign essential hypertension     Well controlled on current regimen  Atrial fibrillation ([facility] )     Follows with Cardiology in [facility]              Other    Continuous opioid dependence ([place] [id number] )     Taking Tramadol rarely         Mild episode of recurrent major depressive disorder (HCC)     Stable on Lexapro 5 mg daily        Other Visit Diagnoses     Medicare annual wellness visit, subsequent        Hordeolum externum, unspecified laterality        Relevant Medications    tobramycin (TOBREX) 0 3 % OINT        BMI Counseling: Body mass index is 32 08 kg/m²  The BMI is above normal  Nutrition recommendations include encouraging healthy choices of fruits and vegetables  Exercise recommendations include exercising 3-5 times per week  Rationale for BMI follow-up plan is due to patient being overweight or obese  Preventive health issues were discussed with patient, and age appropriate screening tests were ordered as noted in patient's After Visit Summary  Personalized health advice and appropriate referrals for health education or preventive services given if needed, as noted in patient's After Visit Summary  History of Present Illness:     Patient presents for a Medicare Wellness Visit    Here for lab review    DM -A1c 6 5%--on Metformin 500 mg BID- microalbumin normal  Lipids- well controlled LDL 79  CMP, CBC WNL    A Fib - sees cardiology is on Flecainide, and Diltiazem, and Xarelto    He does not exercise much-- having too much pain in his joints, he takes Tramadol on occasion very rarely-hasn't filled since 2021  Mostly taking Advil or Tylenol  He tends to get styes in his eye during the summer and asking for Tobramycin which he has used in the past       Patient Care Team:  He Costa MD as PCP - General (Family Medicine)  MD Leonor Maldonado MD Monia Pleasure, MD as Endoscopist     Review of Systems:     Review of Systems   Constitutional: Negative for activity change, appetite change, fatigue and unexpected weight change  HENT:        Gets recurrent styes   Respiratory: Negative for chest tightness and shortness of breath  Cardiovascular: Negative for chest pain and leg swelling  Gastrointestinal: Negative for abdominal pain  Musculoskeletal: Positive for arthralgias  Neurological: Negative for headaches          Problem List:     Patient Active Problem List   Diagnosis   • Anxiety and depression   • Benign essential hypertension   • Controlled type 2 diabetes mellitus ([facility] )   • Atrial fibrillation (HCC)   • Mixed hyperlipidemia   • Chronic anticoagulation   • Gastroesophageal reflux disease   • Chronic vertigo   • Class 1 obesity due to excess calories with serious comorbidity and body mass index (BMI) of 32 0 to 32 9 in adult   • Diastasis recti   • Nonallergic rhinitis   • Ocular migraine   • Continuous opioid dependence ([facility] )   • Mild episode of recurrent major depressive disorder ([facility] )      Past Medical and Surgical History:     Past Medical History:   Diagnosis Date   • Arthritis    • Atrial fibrillation ([facility] )    • Candidiasis, cutaneous    • Colon, diverticulosis    • Depression    • Diabetes mellitus ([facility] )    • Diverticulosis    • GERD (gastroesophageal reflux disease)    • Hyperlipidemia    • Hypertension    • Internal hemorrhoids    • Irregular heart beat     aFIB   • Pneumonia    • Shortness of breath    • Sleep apnea     uses cpap   • Vertigo of central origin      Past Surgical History:   Procedure Laterality Date   • CARDIAC ELECTROPHYSIOLOGY MAPPING AND ABLATION     • COLONOSCOPY     • EGD AND COLONOSCOPY     • [place] COLONOSCOPY FLX DX W/COLLJ SPEC WHEN PFRMD N/A 2018    Procedure: COLONOSCOPY;  Surgeon: Shaquille Velez MD;  Location: [facility]; Service: Gastroenterology   • [place] ESOPHAGOGASTRODUODENOSCOPY TRANSORAL DIAGNOSTIC N/A 10/10/2017    Procedure: ESOPHAGOGASTRODUODENOSCOPY (EGD); Surgeon: Shaquille Velez MD;  Location: [facility]; Service: Gastroenterology   • TONSILLECTOMY        Family History:     Family History   Problem Relation Age of Onset   • Other Mother         CABG,Hepatic Disorder   • Coronary artery disease Mother    • Other Father         CABG   • Diabetes Father    • Cancer Family         Gastrointestinal      Social History:     Social History     Socioeconomic History   • Marital status: /Civil Union     Spouse name: None   • Number of children: None   • Years of education: None   • Highest education level: None   Occupational History   • None   Tobacco Use   • Smoking status: Former     Packs/day: 1 00     Years: 12 00     Pack years: 12 00     Types: Cigarettes     Start date: 1962     Quit date: 10/10/1974     Years since quittin 6   • Smokeless tobacco: Never   Vaping Use   • Vaping Use: Never used   Substance and Sexual Activity   • Alcohol use: Yes     Comment: RARE   • Drug use: No   • Sexual activity: Not Currently   Other Topics Concern   • None   Social History Narrative    Under stress     Social Determinants of Health     Financial Resource Strain: Low Risk    • Difficulty of Paying Living Expenses: Not very hard   Food Insecurity: Not on file   Transportation Needs: No Transportation Needs   • Lack of Transportation (Medical): No   • Lack of Transportation (Non-Medical):  No   Physical Activity: Not on file   Stress: Not on file   Social Connections: Not on file   Intimate Partner Violence: Not on file   Housing Stability: Not on file      Medications and Allergies:     Current Outpatient Medications   Medication Sig Dispense Refill   • escitalopram (LEXAPRO) 10 mg tablet take 1 tablet by mouth once daily (Patient taking differently: Take 5 mg by mouth daily) 90 tablet 5   • fenofibrate (TRICOR) 145 mg tablet take 1 tablet by mouth daily 90 tablet 3   • flecainide (TAMBOCOR) 100 mg tablet Take 100 mg by mouth 2 (two) times a day     • glucose blood test strip Test daily 100 each 0   • metFORMIN (GLUCOPHAGE) 500 mg tablet Take 1 tablet (500 mg total) by mouth 2 (two) times a day with meals 180 tablet 3   • omeprazole (PriLOSEC) 40 MG capsule Take 1 capsule (40 mg total) by mouth daily 90 capsule 3   • rosuvastatin (CRESTOR) 40 MG tablet take 1 tablet by mouth once daily 90 tablet 3   • tobramycin (TOBREX) 0 3 % OINT Administer 0 5 inches to both eyes 3 (three) times a day 3 5 g 0   • traMADol (ULTRAM) 50 mg tablet Take 1 tablet (50 mg total) by mouth every 6 (six) hours as needed for moderate pain 90 tablet 1   • Xarelto 20 MG tablet Take 1 tablet (20 mg total) by mouth daily with breakfast 90 tablet 3   • diltiazem (CARDIZEM) 120 MG tablet Take 1 tablet (120 mg total) by mouth every 12 (twelve) hours 180 tablet 3     No current facility-administered medications for this visit       No Known Allergies   Immunizations:     Immunization History   Administered Date(s) Administered   • COVID-19 MODERNA VACC 0 5 ML IM 03/16/2021, 04/13/2021, 11/03/2021, 05/09/2022   • COVID-19 Pfizer Vac BIVALENT Shay-sucrose 12 Yr+ IM (BOOSTER ONLY) 10/09/2022   • INFLUENZA 09/25/2014, 08/16/2016, 08/17/2017, 08/14/2018, 08/31/2021, 08/30/2022   • Influenza Injectable, MDCK, Preservative Free, Quadrivalent, 0 5 mL 08/30/2019   • Influenza, injectable, quadrivalent, preservative free 0 5 mL 08/14/2018, 09/03/2020   • Influenza, seasonal, injectable 10/10/2013, 09/01/2016   • Influenza, seasonal, injectable, preservative free 09/16/2015   • Pneumococcal Conjugate 13-Valent 05/06/2016   • Pneumococcal Conjugate Vaccine 20-valent (Pcv20), Polysace 11/07/2022   • Pneumococcal Polysaccharide PPV23 1955, 08/17/2017   • Tdap 1955, 05/06/2016   • Zoster 1955, 05/06/2016   • Zoster Vaccine Recombinant 09/12/2019, 02/12/2020      Health Maintenance:         Topic Date Due   • Colorectal Cancer Screening  08/29/2023   • Hepatitis C Screening  Completed     There are no preventive care reminders to display for this patient  Medicare Screening Tests and Risk Assessments:     Gregory Yung is here for his Subsequent Wellness visit  Last Medicare Wellness visit information reviewed, patient interviewed and updates made to the record today  Health Risk Assessment:   Patient rates overall health as good  Patient feels that their physical health rating is same  Patient is very satisfied with their life  Eyesight was rated as same  Hearing was rated as same  Patient feels that their emotional and mental health rating is same  Patients states they are never, rarely angry  Patient states they are never, rarely unusually tired/fatigued  Pain experienced in the last 7 days has been none  Patient states that he has experienced no weight loss or gain in last 6 months  Wears Hearing Aides B/L      Depression Screening:   PHQ-9 Score: 0      Fall Risk Screening: In the past year, patient has experienced: no history of falling in past year      Home Safety:  Patient does not have trouble with stairs inside or outside of their home  Patient has working smoke alarms and has working carbon monoxide detector  Home safety hazards include: none  Nutrition:   Current diet is Regular and Diabetic  Medications:   Patient is currently taking over-the-counter supplements  OTC medications include: see medication list  Patient is able to manage medications       Activities of Daily Living (ADLs)/Instrumental Activities of Daily Living (IADLs):   Walk and transfer into and out of bed and chair?: Yes  Dress and groom yourself?: Yes    Bathe or shower yourself?: Yes    Feed yourself? Yes  Do your laundry/housekeeping?: Yes  Manage your money, pay your bills and track your expenses?: Yes  Make your own meals?: Yes    Do your own shopping?: Yes    Previous Hospitalizations:   Any hospitalizations or ED visits within the last 12 months?: No      Advance Care Planning:   Living will: Yes    Advanced directive: Yes      Cognitive Screening:   Provider or family/friend/caregiver concerned regarding cognition?: No    PREVENTIVE SCREENINGS      Cardiovascular Screening:    General: Screening Not Indicated, History Lipid Disorder, Risks and Benefits Discussed and Screening Current      Diabetes Screening:     General: Screening Not Indicated, History Diabetes, Risks and Benefits Discussed and Screening Current      Colorectal Cancer Screening:     General: Screening Current      Prostate Cancer Screening:    General: Risks and Benefits Discussed    Due for: PSA      Osteoporosis Screening:    General: Screening Not Indicated      Abdominal Aortic Aneurysm (AAA) Screening:    Risk factors include: age between 73-67 yo and tobacco use        General: Risks and Benefits Discussed and Screening Current      Lung Cancer Screening:     General: Screening Not Indicated      Hepatitis C Screening:    General: Screening Current    Screening, Brief Intervention, and Referral to Treatment (SBIRT)    Screening  Typical number of drinks in a day: 0  Typical number of drinks in a week: 0  Interpretation: Low risk drinking behavior  Single Item Drug Screening:  How often have you used an illegal drug (including marijuana) or a prescription medication for non-medical reasons in the past year? never    Single Item Drug Screen Score: 0  Interpretation: Negative screen for possible drug use disorder    Brief Intervention  Alcohol & drug use screenings were reviewed  No concerns regarding substance use disorder identified       Review of Current Opioid Use    Opioid Risk Tool (ORT) "Interpretation: Complete Opioid Risk Tool (ORT)    Other Counseling Topics:   Regular weightbearing exercise  No results found  Physical Exam:     /68 (BP Location: Left arm, Patient Position: Sitting, Cuff Size: Large)   Pulse 60   Ht 5' 8\" (1 727 m)   Wt 95 7 kg (211 lb)   SpO2 97%   BMI 32 08 kg/m²     Physical Exam  Vitals and nursing note reviewed  Constitutional:       General: He is not in acute distress  Appearance: He is well-developed  He is not diaphoretic  HENT:      Head: Normocephalic and atraumatic  Right Ear: External ear normal       Left Ear: External ear normal       Mouth/Throat:      Comments: mask  Eyes:      General:         Right eye: No discharge  Left eye: No discharge  Conjunctiva/sclera: Conjunctivae normal    Cardiovascular:      Rate and Rhythm: Normal rate and regular rhythm  Heart sounds: Murmur heard  No friction rub  No gallop  Pulmonary:      Effort: Pulmonary effort is normal  No respiratory distress  Breath sounds: Normal breath sounds  No stridor  No wheezing or rales  Chest:      Chest wall: No tenderness  Musculoskeletal:         General: No swelling  Right lower leg: No edema  Left lower leg: No edema  Skin:     Findings: No rash  Neurological:      General: No focal deficit present  Mental Status: He is alert  Mental status is at baseline  Cranial Nerves: No cranial nerve deficit  Psychiatric:         Mood and Affect: Mood normal          Behavior: Behavior normal          Thought Content:  Thought content normal          Judgment: Judgment normal           Abhinav Mendoza MD  "

## 2023-08-22 ENCOUNTER — TELEPHONE (OUTPATIENT)
Age: 68
End: 2023-08-22

## 2023-08-22 NOTE — TELEPHONE ENCOUNTER
08/22/23  Screened by: Karol Contrreas    Referring Provider Recall letter      Pre- Screening: There is no height or weight on file to calculate BMI. Has patient been referred for a routine screening Colonoscopy? no  Is the patient between 43-73 years old? yes      Previous Colonoscopy yes   If yes:    Date: 08/29/2018    Facility: [facility]     Reason: routine      SCHEDULING STAFF: If the patient is between 45yrs-49yrs, please advise patient to confirm benefits/coverage with their insurance company for a routine screening colonoscopy, some insurance carriers will only cover at [address] [place] [address] or older. If the patient is over 66years old, please schedule an office visit. Does the patient want to see a Gastroenterologist prior to their procedure OR are they having any GI symptoms? no    Has the patient been hospitalized or had abdominal surgery in the past 6 months? no    Does the patient use supplemental oxygen? no    Does the patient take Coumadin, Lovenox, Plavix, Elliquis, Xarelto, or other blood thinning medication? yes    Has the patient had a stroke, cardiac event, or stent placed in the past year? no    SCHEDULING STAFF: If patient answers NO to above questions, then schedule procedure. If patient answers YES to above questions, then schedule office appointment. If patient is between 45yrs - 49yrs, please advise patient that we will have to confirm benefits & coverage with their insurance company for a routine screening colonoscopy.

## 2023-08-31 DIAGNOSIS — K21.9 GASTROESOPHAGEAL REFLUX DISEASE: ICD-10-CM

## 2023-08-31 RX ORDER — OMEPRAZOLE 40 MG/1
40 CAPSULE, DELAYED RELEASE ORAL DAILY
Qty: 90 CAPSULE | Refills: 3 | Status: SHIPPED | OUTPATIENT
Start: 2023-08-31

## 2023-09-14 ENCOUNTER — OFFICE VISIT (OUTPATIENT)
Age: 68
End: 2023-09-14
Payer: COMMERCIAL

## 2023-09-14 VITALS
SYSTOLIC BLOOD PRESSURE: 128 MMHG | BODY MASS INDEX: 32.43 KG/M2 | HEIGHT: 68 IN | OXYGEN SATURATION: 96 % | WEIGHT: 214 LBS | HEART RATE: 70 BPM | DIASTOLIC BLOOD PRESSURE: 80 MMHG

## 2023-09-14 DIAGNOSIS — Z86.010 HISTORY OF COLON POLYPS: Primary | ICD-10-CM

## 2023-09-14 DIAGNOSIS — I48.91 ATRIAL FIBRILLATION, UNSPECIFIED TYPE (HCC): ICD-10-CM

## 2023-09-14 PROCEDURE — 99213 OFFICE O/P EST LOW 20 MIN: CPT | Performed by: INTERNAL MEDICINE

## 2023-09-14 RX ORDER — DILTIAZEM HYDROCHLORIDE 120 MG/1
CAPSULE, EXTENDED RELEASE ORAL 2 TIMES DAILY
COMMUNITY
Start: 2023-09-02

## 2023-09-14 NOTE — PROGRESS NOTES
[facility]      Chief Complaint: History of colon polyps    HPI:  Howard De Leon is a 76 y.o.  male who presents with history of colon polyps with his last colonoscopy 5 years ago. Patient denies any abdominal pain, change in bowel habits, change in stool caliber, melena, hematochezia, rectal bleeding, tenesmus, or weight loss. He is treated for atrial fibrillation and can usually feel when he is fibrillating. Currently he has no chest sensations. He has occasional exertional shortness of breath. He has had no other GI symptomatology or any chest symptomatology. He follows regularly with cardiology. .      Review of Systems:   Constitutional: No fever or chills, feels well, no tiredness, no recent weight gain or weight loss. HENT: No complaints of earache positive hearing loss, no nosebleeds, no nasal discharge, no sore throat, no hoarseness. Positive tinnitus. Eyes: No complaints of eye pain, no red eyes, no discharge from eyes, no itchy eyes. Cardiovascular: No complaints of slow heart rate, no fast heart rate, no chest pain, no palpitations, no leg claudication, no lower extremity edema. Respiratory: No complaints of shortness of breath, no wheezing, no cough, occasional SOB on exertion, no orthopnea. Gastrointestinal: As noted in HPI  Genitourinary: No complaints of dysuria, no incontinence, no hesitancy, no nocturia. Musculoskeletal: No complaints of arthralgia, no myalgias, no joint swelling or stiffness, no limb pain or swelling. Neurological: No complaints of headache, no confusion, no convulsions, no numbness or tingling, no dizziness or fainting, no limb weakness, no difficulty walking. Skin: No complaints of skin rash or skin lesions, no itching, no skin wound, no dry skin. Hematological/Lymphatic: No complaints of swollen glands, does not bleed easy. Allergic/Immunologic: No immunocompromised state.   Endocrine:  No complaints of polyuria, no polydipsia. Psychiatric/Behavioral: is not suicidal, no sleep disturbances, no anxiety or depression, no change in personality, no emotional problems. Historical Information   Past Medical History:   Diagnosis Date   • Arthritis    • Atrial fibrillation ([address])    • Candidiasis, cutaneous    • Colon, diverticulosis    • Depression    • Diabetes mellitus ([address])    • Diverticulosis    • GERD (gastroesophageal reflux disease)    • Hyperlipidemia    • Hypertension    • Internal hemorrhoids    • Irregular heart beat     aFIB   • Pneumonia    • Shortness of breath    • Sleep apnea     uses cpap   • Vertigo of central origin      Past Surgical History:   Procedure Laterality Date   • CARDIAC ELECTROPHYSIOLOGY MAPPING AND ABLATION     • COLONOSCOPY     • EGD AND COLONOSCOPY     • [place] COLONOSCOPY FLX DX W/COLLJ SPEC WHEN PFRMD N/A 2018    Procedure: COLONOSCOPY;  Surgeon: Radu Verde MD;  Location: [facility]; Service: Gastroenterology   • [place] ESOPHAGOGASTRODUODENOSCOPY TRANSORAL DIAGNOSTIC N/A 10/10/2017    Procedure: ESOPHAGOGASTRODUODENOSCOPY (EGD); Surgeon: Radu Verde MD;  Location: [facility];   Service: Gastroenterology   • TONSILLECTOMY       Social History   Social History     Substance and Sexual Activity   Alcohol Use Yes    Comment: RARE     Social History     Substance and Sexual Activity   Drug Use No     Social History     Tobacco Use   Smoking Status Former   • Packs/day: 1.00   • Years: 12.00   • Total pack years: 12.00   • Types: Cigarettes   • Start date: 1962   • Quit date: 10/10/1974   • Years since quittin.9   Smokeless Tobacco Never     Family History   Problem Relation Age of Onset   • Other Mother         CABG,Hepatic Disorder   • Coronary artery disease Mother    • Other Father         CABG   • Diabetes Father    • Cancer Family         Gastrointestinal         Current Medications: has a current medication list which includes the following prescription(s): cartia xt, escitalopram, fenofibrate, flecainide, glucose blood, metformin, omeprazole, rosuvastatin, tobramycin, tramadol, and xarelto. Vital Signs: /80   Pulse 70   Ht 5' 8" (1.727 m)   Wt 97.1 kg (214 lb)   SpO2 96%   BMI 32.54 kg/m²       Physical Exam:   Constitutional  General Appearance: No acute distress, well appearing and well nourished  Head  Normocephalic  Eyes  Conjunctivae and lids: No swelling, erythema, or discharge. Pupils and irises: Equal, round and reactive to light. Ears, Nose, Mouth, and Throat  External inspection of ears and nose: Normal  Nasal mucosa, septum and turbinates: Normal without edema or erythema/   Oropharynx: Normal with no erythema, edema, exudate or lesions. Neck  Normal range of motion. Neck supple. Cardiovascular  Auscultation of the heart: Normal rate and rhythm, normal S1 and S2 without murmurs. Examination of the extremities for edema and/or varicosities: Normal  Pulmonary/Chest  Respiratory effort: No increased work of breathing or signs of respiratory distress. Auscultation of lungs: Clear to auscultation, equal breath sounds bilaterally, no wheezes, rales, no rhonchi. Abdomen  Abdomen: Non-tender, no masses. Liver and spleen: No hepatomegaly or splenomegaly. Musculoskeletal  Gait and station: normal.  Digits and Nails: normal without clubbing or cyanosis. Inspection/palpation of joints, bones, and muscles: Normal  Neurological  No nystagmus or asterixis. Skin  Skin and subcutaneous tissue: Normal without rashes or lesions. Lymphatic  Palpation of the lymph nodes in neck: No lymphadenopathy.    Psychiatric  Orientation to person, place and time: Normal.  Mood and affect: Normal.         Labs:  Lab Results   Component Value Date    ALT 25 04/22/2023    AST 29 04/22/2023    BUN 20 04/22/2023    CALCIUM 9.6 04/22/2023     (H) 04/22/2023    CHOL 153 03/27/2018    CO2 22 04/22/2023    CREATININE 1.15 04/22/2023    HDL 40 04/22/2023    HCT 39.3 04/22/2023    HGB 12.4 04/22/2023    HGBA1C 6.5 (H) 04/22/2023     04/22/2023    K 4.1 04/22/2023    PSA 0.5 04/19/2021     03/27/2018    TRIG 92 04/22/2023    WBC 6.09 04/22/2023         X-Rays & Procedures:   No orders to display           ______________________________________________________________________      Assessment & Plan:     Diagnoses and all orders for this visit:    History of colon polyps  -     Colonoscopy; Future    Atrial fibrillation, unspecified type [facility])      Patient is not currently fibrillating. He will check on the amount of time he has to hold his anticoagulant. He will be scheduled for colonoscopy.   Further recommendations will depend on study results

## 2023-09-14 NOTE — PATIENT INSTRUCTIONS
Scheduled date of colonoscopy (as of today): 10/17/23  Physician performing colonoscopy: Shivani  Location of colonoscopy: [facility]  Bowel prep reviewed with patient: Miralax  Instructions reviewed with patient by: Pepe WHITTEN  Clearances:

## 2023-10-12 DIAGNOSIS — E11.9 CONTROLLED TYPE 2 DIABETES MELLITUS WITHOUT COMPLICATION, WITHOUT LONG-TERM CURRENT USE OF INSULIN (HCC): ICD-10-CM

## 2023-10-12 RX ORDER — BLOOD SUGAR DIAGNOSTIC
STRIP MISCELLANEOUS DAILY
Qty: 100 STRIP | Refills: 3 | Status: SHIPPED | OUTPATIENT
Start: 2023-10-12

## 2023-10-13 ENCOUNTER — TELEPHONE (OUTPATIENT)
Age: 68
End: 2023-10-13

## 2023-10-13 NOTE — TELEPHONE ENCOUNTER
Spoke to Ramakrishna esteban - confirmed colonoscopy for 10/17/23. Patient has a ride and is Ready to Go!

## 2023-10-17 ENCOUNTER — ANESTHESIA EVENT (OUTPATIENT)
Dept: GASTROENTEROLOGY | Facility: HOSPITAL | Age: 68
End: 2023-10-17

## 2023-10-17 ENCOUNTER — HOSPITAL ENCOUNTER (OUTPATIENT)
Dept: GASTROENTEROLOGY | Facility: HOSPITAL | Age: 68
Setting detail: OUTPATIENT SURGERY
Discharge: HOME | End: 2023-10-17
Attending: INTERNAL MEDICINE
Payer: COMMERCIAL

## 2023-10-17 ENCOUNTER — ANESTHESIA (OUTPATIENT)
Dept: GASTROENTEROLOGY | Facility: HOSPITAL | Age: 68
End: 2023-10-17

## 2023-10-17 VITALS
BODY MASS INDEX: 31.67 KG/M2 | RESPIRATION RATE: 16 BRPM | HEIGHT: 68 IN | OXYGEN SATURATION: 96 % | TEMPERATURE: 97.7 F | SYSTOLIC BLOOD PRESSURE: 160 MMHG | HEART RATE: 54 BPM | DIASTOLIC BLOOD PRESSURE: 72 MMHG | WEIGHT: 209 LBS

## 2023-10-17 DIAGNOSIS — Z86.010 HISTORY OF COLON POLYPS: ICD-10-CM

## 2023-10-17 LAB — GLUCOSE SERPL-MCNC: 100 MG/DL (ref 65–140)

## 2023-10-17 PROCEDURE — 88305 TISSUE EXAM BY PATHOLOGIST: CPT | Performed by: PATHOLOGY

## 2023-10-17 PROCEDURE — 82948 REAGENT STRIP/BLOOD GLUCOSE: CPT

## 2023-10-17 RX ORDER — LIDOCAINE HYDROCHLORIDE 20 MG/ML
INJECTION, SOLUTION EPIDURAL; INFILTRATION; INTRACAUDAL; PERINEURAL AS NEEDED
Status: DISCONTINUED | OUTPATIENT
Start: 2023-10-17 | End: 2023-10-17

## 2023-10-17 RX ORDER — SODIUM CHLORIDE, SODIUM LACTATE, POTASSIUM CHLORIDE, CALCIUM CHLORIDE 600; 310; 30; 20 MG/100ML; MG/100ML; MG/100ML; MG/100ML
INJECTION, SOLUTION INTRAVENOUS CONTINUOUS PRN
Status: DISCONTINUED | OUTPATIENT
Start: 2023-10-17 | End: 2023-10-17

## 2023-10-17 RX ORDER — PROPOFOL 10 MG/ML
INJECTION, EMULSION INTRAVENOUS AS NEEDED
Status: DISCONTINUED | OUTPATIENT
Start: 2023-10-17 | End: 2023-10-17

## 2023-10-17 RX ADMIN — PROPOFOL 120 MG: 10 INJECTION, EMULSION INTRAVENOUS at 07:37

## 2023-10-17 RX ADMIN — SODIUM CHLORIDE, SODIUM LACTATE, POTASSIUM CHLORIDE, AND CALCIUM CHLORIDE: .6; .31; .03; .02 INJECTION, SOLUTION INTRAVENOUS at 06:48

## 2023-10-17 RX ADMIN — PROPOFOL 30 MG: 10 INJECTION, EMULSION INTRAVENOUS at 07:46

## 2023-10-17 RX ADMIN — LIDOCAINE HYDROCHLORIDE 60 MG: 20 INJECTION, SOLUTION EPIDURAL; INFILTRATION; INTRACAUDAL; PERINEURAL at 07:37

## 2023-10-17 RX ADMIN — PROPOFOL 50 MG: 10 INJECTION, EMULSION INTRAVENOUS at 07:41

## 2023-10-17 NOTE — H&P
History and Physical - [facility]  Palak Carr 76 y.o. male MRN: [id number]                  HPI: Palak Carr is a 76y.o. year old male who presents for colonoscopy for history of colon polyps. Last colonoscopy 5 years ago      REVIEW OF SYSTEMS: Per the HPI, and otherwise unremarkable. Historical Information   Past Medical History:   Diagnosis Date    Arthritis     Atrial fibrillation (HCC)     Candidiasis, cutaneous     Colon polyp     Colon, diverticulosis     CPAP (continuous positive airway pressure) dependence     Depression     Diabetes mellitus (HCC)     Diverticulosis     GERD (gastroesophageal reflux disease)     Hyperlipidemia     Hypertension     Internal hemorrhoids     Irregular heart beat     aFIB    Pneumonia     Shortness of breath     Sleep apnea     uses cpap    Vertigo of central origin      Past Surgical History:   Procedure Laterality Date    CARDIAC ELECTROPHYSIOLOGY MAPPING AND ABLATION      COLONOSCOPY      EGD AND COLONOSCOPY      MD COLONOSCOPY FLX DX W/COLLJ SPEC WHEN PFRMD N/A 2018    Procedure: COLONOSCOPY;  Surgeon: Elsie Tapia MD;  Location: [facility]; Service: Gastroenterology    MD ESOPHAGOGASTRODUODENOSCOPY TRANSORAL DIAGNOSTIC N/A 10/10/2017    Procedure: ESOPHAGOGASTRODUODENOSCOPY (EGD); Surgeon: Elsie Tapia MD;  Location: [facility];   Service: Gastroenterology    TONSILLECTOMY       Social History   Social History     Substance and Sexual Activity   Alcohol Use Yes    Comment: RARE     Social History     Substance and Sexual Activity   Drug Use No     Social History     Tobacco Use   Smoking Status Former    Packs/day: 1.00    Years: 12.00    Total pack years: 12.00    Types: Cigarettes    Start date: 1962    Quit date: 10/10/1974    Years since quittin.0   Smokeless Tobacco Never     Family History   Problem Relation Age of Onset    Other Mother         CABG,Hepatic Disorder    Coronary artery disease Mother     Other Father         CABG    Diabetes Father     Cancer Family         Gastrointestinal       Meds/Allergies     (Not in a hospital admission)      No Known Allergies    Objective     Blood pressure 156/74, pulse 66, temperature 97.6 °F (36.4 °C), temperature source Temporal, resp. rate 18, height 5' 8" (1.727 m), weight 94.8 kg (208 lb 15.9 oz), SpO2 98 %.       PHYSICAL EXAM    Gen: NAD  CV: RRR  CHEST: Clear  ABD: soft, NT/ND  EXT: no edema  Neuro: AAO      ASSESSMENT/PLAN:  This is a 76y.o. year old male here for history of colon polyps    PLAN:   Procedure: Colonoscopy

## 2023-10-17 NOTE — ANESTHESIA POSTPROCEDURE EVALUATION
Post-Op Assessment Note    CV Status:  Stable  Pain Score: 0    Pain management: adequate     Mental Status:  Alert and awake   Hydration Status:  Stable   PONV Controlled:  None   Airway Patency:  Patent      Post Op Vitals Reviewed: Yes      Staff: CRNA         No notable events documented.     /68 (10/17/23 0755)    Temp 97.7 °F (36.5 °C) (10/17/23 0755)    Pulse 56 (10/17/23 0755)   Resp 16 (10/17/23 0755)    SpO2 99 % (10/17/23 0755)

## 2023-10-17 NOTE — ANESTHESIA PREPROCEDURE EVALUATION
Procedure:  COLONOSCOPY    Relevant Problems   ANESTHESIA (within normal limits)      CARDIO   (+) Atrial fibrillation (HCC)   (+) Benign essential hypertension   (+) Mixed hyperlipidemia   (+) Ocular migraine      ENDO   (+) Controlled type 2 diabetes mellitus (HCC)      GI/HEPATIC  NPO confirmed  BMI 31.8   (+) Gastroesophageal reflux disease      MUSCULOSKELETAL   (+) Diastasis recti      NEURO/PSYCH   (+) Anxiety and depression   (+) Continuous opioid dependence (HCC)   (+) Mild episode of recurrent major depressive disorder (HCC)   (+) Ocular migraine      PULMONARY   (-) URI (upper respiratory infection)      No Known Allergies  Social History     Tobacco Use    Smoking status: Former     Packs/day: 1.00     Years: 12.00     Total pack years: 12.00     Types: Cigarettes     Start date: 1962     Quit date: 10/10/1974     Years since quittin.0    Smokeless tobacco: Never   Vaping Use    Vaping Use: Never used   Substance Use Topics    Alcohol use: Yes     Comment: RARE    Drug use: No     Current Outpatient Medications   Medication Instructions    Cartia  MG 24 hr capsule Oral, 2 times daily    escitalopram (LEXAPRO) 10 mg tablet take 1 tablet by mouth once daily    fenofibrate (TRICOR) 145 mg tablet take 1 tablet by mouth daily    flecainide (TAMBOCOR) 100 mg, Oral, 2 times daily    glucose blood (OneTouch Ultra) test strip Daily, Test    metFORMIN (GLUCOPHAGE) 500 mg, Oral, 2 times daily with meals    omeprazole (PRILOSEC) 40 mg, Oral, Daily    rosuvastatin (CRESTOR) 40 MG tablet take 1 tablet by mouth once daily    tobramycin (TOBREX) 0.3 % OINT 0.5 inches, Both Eyes, 3 times daily    traMADol (ULTRAM) 50 mg, Oral, Every 6 hours PRN    Xarelto 20 mg, Oral, Daily with breakfast     Lab Results   Component Value Date    WBC 6.09 2023    HGB 12.4 2023    HCT 39.3 2023     2023    SODIUM 139 2023    K 4.1 2023     (H) 2023    CO2 22 2023 BUN 20 04/22/2023    CREATININE 1.15 04/22/2023    HGBA1C 6.5 (H) 04/22/2023    AST 29 04/22/2023    ALT 25 04/22/2023    ALKPHOS 34 (L) 04/22/2023    TBILI 0.53 04/22/2023    ALB 3.9 04/22/2023     Vitals:    10/17/23 0644   BP: 156/74   Pulse: 66   Resp: 18   Temp: 97.6 °F (36.4 °C)   SpO2: 98%       Physical Exam    Airway    Mallampati score: II  TM Distance: >3 FB       Dental   Comment: Denies loose/chipped teeth     Cardiovascular  Rhythm: irregular, Rate: normal    Pulmonary   Breath sounds clear to auscultation    Other Findings        Anesthesia Plan  ASA Score- 3     Anesthesia Type- IV sedation with anesthesia with ASA Monitors. Additional Monitors:     Airway Plan:     Comment: O2 mask, natural airway, EtCO2 monitor. Risks discussed including awareness, aspiration, drug reactions and conversion to GA. Jaki Madera Plan Factors-Exercise tolerance (METS): >4 METS. Chart reviewed. EKG reviewed. Existing labs reviewed. Patient summary reviewed. Patient is not a current smoker. Induction- intravenous. Postoperative Plan-     Informed Consent- Anesthetic plan and risks discussed with patient. I personally reviewed this patient with the CRNA. Discussed and agreed on the Anesthesia Plan with the CRNA. Jaki Madera

## 2023-10-20 PROCEDURE — 88305 TISSUE EXAM BY PATHOLOGIST: CPT | Performed by: PATHOLOGY

## 2023-10-25 ENCOUNTER — APPOINTMENT (OUTPATIENT)
Dept: LAB | Facility: CLINIC | Age: 68
End: 2023-10-25
Payer: COMMERCIAL

## 2023-10-25 DIAGNOSIS — E11.9 CONTROLLED TYPE 2 DIABETES MELLITUS WITHOUT COMPLICATION, WITHOUT LONG-TERM CURRENT USE OF INSULIN (HCC): ICD-10-CM

## 2023-10-25 LAB
ALBUMIN SERPL BCP-MCNC: 4.2 G/DL (ref 3.5–5)
ALP SERPL-CCNC: 31 U/L (ref 34–104)
ALT SERPL W P-5'-P-CCNC: 17 U/L (ref 7–52)
ANION GAP SERPL CALCULATED.3IONS-SCNC: 11 MMOL/L
AST SERPL W P-5'-P-CCNC: 23 U/L (ref 13–39)
BILIRUB SERPL-MCNC: 0.43 MG/DL (ref 0.2–1)
BUN SERPL-MCNC: 19 MG/DL (ref 5–25)
CALCIUM SERPL-MCNC: 8.8 MG/DL (ref 8.4–10.2)
CHLORIDE SERPL-SCNC: 106 MMOL/L (ref 96–108)
CHOLEST SERPL-MCNC: 126 MG/DL
CO2 SERPL-SCNC: 25 MMOL/L (ref 21–32)
CREAT SERPL-MCNC: 1.04 MG/DL (ref 0.6–1.3)
EST. AVERAGE GLUCOSE BLD GHB EST-MCNC: 154 MG/DL
GFR SERPL CREATININE-BSD FRML MDRD: 73 ML/MIN/1.73SQ M
GLUCOSE P FAST SERPL-MCNC: 93 MG/DL (ref 65–99)
HBA1C MFR BLD: 7 %
HDLC SERPL-MCNC: 40 MG/DL
LDLC SERPL CALC-MCNC: 67 MG/DL (ref 0–100)
POTASSIUM SERPL-SCNC: 3.8 MMOL/L (ref 3.5–5.3)
PROT SERPL-MCNC: 6.4 G/DL (ref 6.4–8.4)
SODIUM SERPL-SCNC: 142 MMOL/L (ref 135–147)
TRIGL SERPL-MCNC: 94 MG/DL

## 2023-10-25 PROCEDURE — 80053 COMPREHEN METABOLIC PANEL: CPT

## 2023-10-25 PROCEDURE — 3051F HG A1C>EQUAL 7.0%<8.0%: CPT | Performed by: INTERNAL MEDICINE

## 2023-10-25 PROCEDURE — 80061 LIPID PANEL: CPT

## 2023-10-25 PROCEDURE — 36415 COLL VENOUS BLD VENIPUNCTURE: CPT

## 2023-10-25 PROCEDURE — 83036 HEMOGLOBIN GLYCOSYLATED A1C: CPT

## 2023-10-27 ENCOUNTER — VBI (OUTPATIENT)
Dept: ADMINISTRATIVE | Facility: OTHER | Age: 68
End: 2023-10-27

## 2023-11-03 ENCOUNTER — RA CDI HCC (OUTPATIENT)
Dept: OTHER | Facility: HOSPITAL | Age: 68
End: 2023-11-03

## 2023-11-03 NOTE — PROGRESS NOTES
[address] coding opportunities       Chart reviewed, no opportunity found: [address]        Patients Insurance     Medicare Insurance: [employer] Advantage

## 2023-11-08 ENCOUNTER — VBI (OUTPATIENT)
Dept: ADMINISTRATIVE | Facility: OTHER | Age: 68
End: 2023-11-08

## 2023-11-08 NOTE — TELEPHONE ENCOUNTER
11/08/23 3:10 PM    Patient contacted (left message) to bring Advance Directive, POLST, or Living Will document to next scheduled pcp visit. Thank you.   Ofelia Guerrier  [facility]

## 2023-11-10 ENCOUNTER — TELEPHONE (OUTPATIENT)
Dept: ADMINISTRATIVE | Facility: OTHER | Age: 68
End: 2023-11-10

## 2023-11-10 ENCOUNTER — OFFICE VISIT (OUTPATIENT)
Dept: FAMILY MEDICINE CLINIC | Facility: CLINIC | Age: 68
End: 2023-11-10
Payer: COMMERCIAL

## 2023-11-10 VITALS
HEIGHT: 68 IN | BODY MASS INDEX: 31.22 KG/M2 | OXYGEN SATURATION: 96 % | WEIGHT: 206 LBS | SYSTOLIC BLOOD PRESSURE: 141 MMHG | DIASTOLIC BLOOD PRESSURE: 80 MMHG | TEMPERATURE: 97.2 F | HEART RATE: 61 BPM

## 2023-11-10 DIAGNOSIS — E11.9 CONTROLLED TYPE 2 DIABETES MELLITUS WITHOUT COMPLICATION, WITHOUT LONG-TERM CURRENT USE OF INSULIN (HCC): ICD-10-CM

## 2023-11-10 DIAGNOSIS — L98.9 SKIN LESION OF CHEEK: ICD-10-CM

## 2023-11-10 DIAGNOSIS — F41.9 ANXIETY: ICD-10-CM

## 2023-11-10 DIAGNOSIS — E78.2 MIXED HYPERLIPIDEMIA: Primary | ICD-10-CM

## 2023-11-10 PROCEDURE — 99214 OFFICE O/P EST MOD 30 MIN: CPT | Performed by: FAMILY MEDICINE

## 2023-11-10 RX ORDER — ALPRAZOLAM 0.25 MG/1
0.25 TABLET ORAL DAILY PRN
Qty: 30 TABLET | Refills: 0 | Status: SHIPPED | OUTPATIENT
Start: 2023-11-10

## 2023-11-10 NOTE — ASSESSMENT & PLAN NOTE
Advised to continue Lexapro  Rx for Xanax to use sparingly. Discussed risks and that this is not intended for daily or chronic use.    PDMP reviewed- no recent Rx's

## 2023-11-10 NOTE — LETTER
Diabetic Eye Exam Form    Date Requested: 11/10/23  Patient: Yael Naik  Patient : 1955   Referring Provider: Isaac Lew MD      DIABETIC Eye Exam Date _______________________________      Type of Exam MUST be documented for Diabetic Eye Exams. Please CHECK ONE. Retinal Exam       Dilated Retinal Exam       OCT       Optomap-Iris Exam      Fundus Photography       Left Eye - Please check Retinopathy or No Retinopathy        Exam did show retinopathy    Exam did not show retinopathy       Right Eye - Please check Retinopathy or No Retinopathy       Exam did show retinopathy    Exam did not show retinopathy       Comments __________________________________________________________    Practice Providing Exam ______________________________________________    Exam Performed By (print name) _______________________________________      Provider Signature ___________________________________________________      These reports are needed for  compliance. Please fax this completed form and a copy of the Diabetic Eye Exam report to our office located at [address] [place] [address] as soon as possible via Fax [phone] attention Mini: Phone [phone]  We thank you for your assistance in treating our mutual patient.

## 2023-11-10 NOTE — TELEPHONE ENCOUNTER
Upon review of the In Basket request and the patient's chart, initial outreach has been made via fax to facility. Please see Contacts section for details.      Thank you  Juan Ivy MA

## 2023-11-10 NOTE — PROGRESS NOTES
Assessment/Plan:         Problem List Items Addressed This Visit        Endocrine    Controlled type 2 diabetes mellitus ([address])       Lab Results   Component Value Date    HGBA1C 7.0 (H) 10/25/2023   A1c is up a bit  Discussed increasing Metformin - declined at this time. Will work on diet, and continue to lose weight. Repeat labs in 6 months          Relevant Orders    Hemoglobin A1C    Comprehensive metabolic panel    Albumin / creatinine urine ratio    Lipid Panel with Direct LDL reflex       Musculoskeletal and Integument    Skin lesion of cheek     Concerning for [address] [place] [address]  Will refer to Derm         Relevant Orders    Ambulatory referral to Dermatology       Other    Anxiety     Advised to continue Lexapro  Rx for Xanax to use sparingly. Discussed risks and that this is not intended for daily or chronic use. PDMP reviewed- no recent Rx's         Relevant Medications    ALPRAZolam (XANAX) 0.25 mg tablet    Mixed hyperlipidemia - Primary     On Crestor and Tricor          Relevant Orders    Comprehensive metabolic panel    Lipid Panel with Direct LDL reflex         Subjective:      Patient ID: Cristina Estrada is a 76 y.o. male. Here for a diabetic check up. A1c up to 7% from 6.5% - he lost some weight. He is on Metformin 500 mg BID. Lipids - well controlled on Crestor and Fenofibrate. He has anxiety and stress related to his wife. He is on Lexapro -tried stopping it but felt like he needed to stay on it. He is taking 5 mg Lexapro. He asks for Xanax, previously was prescribed by previous Pcp and he takes very sparingly, has not filled in years. PDMP reviewed. He has a skin lesion on the L cheek that I noticed on exam - he states he had this removed in the past.     Diabetes  Hypoglycemia symptoms include nervousness/anxiousness. Pertinent negatives for hypoglycemia include no headaches. Pertinent negatives for diabetes include no chest pain and no fatigue.    Medication Refill  Pertinent negatives include no abdominal pain, chest pain, fatigue or headaches. The following portions of the patient's history were reviewed and updated as appropriate:   Past Medical History:  He has a past medical history of Arthritis, Atrial fibrillation ([address]), Candidiasis, cutaneous, Colon polyp, Colon, diverticulosis, CPAP (continuous positive airway pressure) dependence, Depression, Diabetes mellitus ([address]), Diverticulosis, GERD (gastroesophageal reflux disease), Hyperlipidemia, Hypertension, Internal hemorrhoids, Irregular heart beat, Pneumonia, Shortness of breath, Sleep apnea, and Vertigo of central origin. ,  _______________________________________________________________________  Medical Problems:  does not have any pertinent problems on file.,  _______________________________________________________________________  Past Surgical History:   has a past surgical history that includes Tonsillectomy; EGD AND COLONOSCOPY; Colonoscopy; pr esophagogastroduodenoscopy transoral diagnostic (N/A, 10/10/2017); pr colonoscopy flx dx w/collj spec when pfrmd (N/A, 8/29/2018); and Cardiac electrophysiology mapping and ablation. ,  _______________________________________________________________________  Family History:  family history includes Cancer in his family; Coronary artery disease in his mother; Diabetes in his father; Other in his father and mother.,  _______________________________________________________________________  Social History:   reports that he quit smoking about 49 years ago. His smoking use included cigarettes. He started smoking about 61 years ago. He has a 12.00 pack-year smoking history. He has never used smokeless tobacco. He reports current alcohol use. He reports that he does not use drugs. ,  _______________________________________________________________________  Allergies:  has No Known Allergies. .  _______________________________________________________________________  Current Outpatient Medications   Medication Sig Dispense Refill   • ALPRAZolam (XANAX) 0.25 mg tablet Take 1 tablet (0.25 mg total) by mouth daily as needed for anxiety 30 tablet 0   • Cartia  MG 24 hr capsule Take by mouth 2 (two) times a day     • escitalopram (LEXAPRO) 10 mg tablet take 1 tablet by mouth once daily (Patient taking differently: Take 5 mg by mouth daily) 90 tablet 5   • fenofibrate (TRICOR) 145 mg tablet take 1 tablet by mouth daily 90 tablet 3   • flecainide (TAMBOCOR) 100 mg tablet Take 100 mg by mouth 2 (two) times a day     • glucose blood (OneTouch Ultra) test strip TEST DAILY 100 strip 3   • metFORMIN (GLUCOPHAGE) 500 mg tablet Take 1 tablet (500 mg total) by mouth 2 (two) times a day with meals 180 tablet 3   • omeprazole (PriLOSEC) 40 MG capsule Take 1 capsule (40 mg total) by mouth daily 90 capsule 3   • rosuvastatin (CRESTOR) 40 MG tablet take 1 tablet by mouth once daily 90 tablet 3   • tobramycin (TOBREX) 0.3 % OINT Administer 0.5 inches to both eyes 3 (three) times a day 3.5 g 0   • traMADol (ULTRAM) 50 mg tablet Take 1 tablet (50 mg total) by mouth every 6 (six) hours as needed for moderate pain 90 tablet 1   • Xarelto 20 MG tablet Take 1 tablet (20 mg total) by mouth daily with breakfast 90 tablet 3     No current facility-administered medications for this visit.     _______________________________________________________________________  Review of Systems   Constitutional:  Negative for activity change, appetite change, fatigue and unexpected weight change. Respiratory:  Negative for chest tightness and shortness of breath. Cardiovascular:  Negative for chest pain and leg swelling. Gastrointestinal:  Negative for abdominal pain. Skin:         Lesion on cheek   Neurological:  Negative for headaches. Psychiatric/Behavioral:  Positive for dysphoric mood. The patient is nervous/anxious.           Objective:  Vitals:    11/10/23 0841   BP: 141/80   Pulse: 61   Temp: (!) 97.2 °F (36.2 °C) SpO2: 96%   Weight: 93.4 kg (206 lb)   Height: 5' 8" (1.727 m)     Body mass index is 31.32 kg/m². Physical Exam  Vitals and nursing note reviewed. Constitutional:       General: He is not in acute distress. Appearance: He is well-developed. He is obese. He is not diaphoretic. HENT:      Head: Normocephalic and atraumatic. Cardiovascular:      Rate and Rhythm: Normal rate and regular rhythm. Pulses: Pulses are weak. Dorsalis pedis pulses are 0 on the right side and 0 on the left side. Posterior tibial pulses are 0 on the right side and 0 on the left side. Heart sounds: Normal heart sounds. No murmur heard. No friction rub. No gallop. Pulmonary:      Effort: Pulmonary effort is normal. No respiratory distress. Breath sounds: Normal breath sounds. No wheezing or rales. Chest:      Chest wall: No tenderness. Musculoskeletal:         General: No swelling. Feet:      Right foot:      Skin integrity: Callus and dry skin present. No ulcer, skin breakdown, erythema or warmth. Left foot:      Skin integrity: Callus and dry skin present. No ulcer, skin breakdown, erythema or warmth. Skin:     Findings: Lesion present. Neurological:      General: No focal deficit present. Mental Status: He is alert and oriented to person, place, and time. Psychiatric:         Behavior: Behavior normal.         Thought Content: Thought content normal.         Judgment: Judgment normal.             Diabetic Foot Exam    Patient's shoes and socks removed. Right Foot/Ankle   Right Foot Inspection  Skin Exam: skin normal, skin intact, dry skin, callus and callus. No warmth, no erythema, no maceration, no abnormal color, no pre-ulcer and no ulcer. Toe Exam: ROM and strength within normal limits. Sensory   Vibration: diminished  Proprioception: intact  Monofilament testing: intact    Vascular  Capillary refills: < 3 seconds  The right DP pulse is 0.  The right PT pulse is 0. Left Foot/Ankle  Left Foot Inspection  Skin Exam: skin normal, skin intact, dry skin and callus. No warmth, no erythema, no maceration, normal color, no pre-ulcer and no ulcer. Toe Exam: ROM and strength within normal limits. Sensory   Vibration: diminished  Proprioception: intact  Monofilament testing: intact    Vascular  Capillary refills: < 3 seconds  The left DP pulse is 0. The left PT pulse is 0.      Assign Risk Category  Deformity present  No loss of protective sensation  Weak pulses  Risk: 1

## 2023-11-10 NOTE — ASSESSMENT & PLAN NOTE
Lab Results   Component Value Date    HGBA1C 7.0 (H) 10/25/2023   A1c is up a bit  Discussed increasing Metformin - declined at this time. Will work on diet, and continue to lose weight.  Repeat labs in 6 months

## 2023-11-10 NOTE — TELEPHONE ENCOUNTER
----- Message from Topher Barrett sent at 11/10/2023  8:51 AM EST -----  Regarding: Baljeet Jacob Request  11/10/23 8:51 AM    Hello, our patient Israel Figueroa has had Diabetic Eye Exam completed/performed. Please assist in updating the patient chart by making an External outreach to [facility] facility located in [facility]. The date of service is 2023.     Thank you,  Topher MENESES

## 2023-11-17 NOTE — TELEPHONE ENCOUNTER
Upon review of the In Basket request we were able to locate, review, and update the patient chart as requested for Diabetic Eye Exam.    Any additional questions or concerns should be emailed to the Practice Liaisons via the appropriate education email address, please do not reply via In Basket.     Thank you  Cortes Carr MA

## 2023-12-26 ENCOUNTER — OFFICE VISIT (OUTPATIENT)
Dept: URGENT CARE | Facility: CLINIC | Age: 68
End: 2023-12-26
Payer: COMMERCIAL

## 2023-12-26 VITALS
HEART RATE: 100 BPM | RESPIRATION RATE: 18 BRPM | SYSTOLIC BLOOD PRESSURE: 140 MMHG | TEMPERATURE: 99.9 F | DIASTOLIC BLOOD PRESSURE: 78 MMHG | OXYGEN SATURATION: 95 %

## 2023-12-26 DIAGNOSIS — U07.1 COVID: Primary | ICD-10-CM

## 2023-12-26 PROCEDURE — 99213 OFFICE O/P EST LOW 20 MIN: CPT | Performed by: PHYSICIAN ASSISTANT

## 2023-12-26 PROCEDURE — S9083 URGENT CARE CENTER GLOBAL: HCPCS | Performed by: PHYSICIAN ASSISTANT

## 2023-12-26 RX ORDER — MOLNUPIRAVIR 200 MG/1
800 CAPSULE ORAL EVERY 12 HOURS
Qty: 40 CAPSULE | Refills: 0 | Status: SHIPPED | OUTPATIENT
Start: 2023-12-26 | End: 2023-12-26

## 2023-12-26 RX ORDER — MOLNUPIRAVIR 200 MG/1
800 CAPSULE ORAL EVERY 12 HOURS
Qty: 40 CAPSULE | Refills: 0 | Status: SHIPPED | OUTPATIENT
Start: 2023-12-26 | End: 2023-12-31

## 2023-12-26 NOTE — PATIENT INSTRUCTIONS
Continue to monitor symptoms.  If new or worsening symptoms develop, go immediately to Er. Drink plenty of fluids.  Follow up with Family Doctor this week.    COVID-19 and Chronic Health Conditions   WHAT YOU NEED TO KNOW:   Your chronic condition may increase your risk for COVID-19 or serious problems it can cause. Healthcare providers might need to make changes that affect how you usually manage your chronic health condition. Providers may change hours of operation or not have patients come in to be seen. You may not be able to make appointments to get blood drawn or to have tests or procedures. This may continue until the virus that causes COVID-19 is controlled. Until then, you can take steps to manage your condition. The steps will also lower your risk for COVID-19 or the serious problems it causes. If you do develop COVID-19, healthcare providers will tell you when it is okay to be around others after you recover. This depends on your chronic condition, any symptoms of COVID-19 that developed, and how severe the symptoms were.  DISCHARGE INSTRUCTIONS:   Call your local emergency number (911 in the ) or an emergency department if:   You have trouble breathing or shortness of breath.    You have chest pain or pressure that lasts longer than 5 minutes.    You become confused or hard to wake.    Return to the emergency department if:   The skin on your face, fingers, or toes look blue or darker than usual.      Call your doctor or specialist if:   You have a fever.    You have symptoms of COVID-19.    You have questions or concerns about COVID-19 or your chronic condition.    What you need to know about serious problems from the virus:  You may develop long-term health problems caused by the virus. Your risk is higher if you are 65 or older. A weak immune system, obesity, diabetes, chronic kidney disease, or a heart or lung condition can also increase your risk. Your risk is also higher if you are a current or  former cigarette smoker. COVID-19 can lead to any of the following:  Multisymptom inflammatory syndrome in adults (MIS-A) or in children (MIS-C), causing inflammation in the heart, digestive system, skin, or brain    Shortness of breath, serious lower respiratory conditions, such as pneumonia or acute respiratory distress syndrome (ARDS)    Blood clots or blood vessel damage    Organ damage from a lack of oxygen or from blood clots    Sleep problems    Problems thinking clearly, remembering information, or concentrating    Mood changes, depression, or anxiety    Long-term problems tasting or smelling    Loss of appetite and weight loss    Nerve pain    Fatigue (feeling mentally and physically tired)    How the 2019 coronavirus spreads:  Close personal contact with an infected person increases your risk for infection. This means being within 6 feet (2 meters) of the person for at least 15 minutes over 24 hours.  The virus travels in droplets that form when a person talks, sings, coughs, or sneezes.  The droplets can also float in the air for minutes or hours. Infection happens when you breathe in the droplets or get them in your eyes or nose.    Person-to-person contact can spread the virus.  For example, a person with the virus on his or her hands can spread it by shaking hands with someone.    The virus can stay on objects and surfaces for hours to days.  You may become infected by touching the object or surface and then touching your eyes or mouth.    What you need to know about the signs and symptoms of COVID-19:  Signs and symptoms usually start about 5 days after infection but can take 2 to 14 days. Signs and symptoms range from mild to severe. You may feel like you have the flu or a bad cold. Your chronic health condition may cause some of the same symptoms COVID-19 causes. This can make it hard to know if a symptom is from COVID-19 or your chronic condition. Keep a record of any new or worsening symptom you  have. This is especially important if you have a condition that often causes shortness of breath. Your provider can tell you if you should be tested for COVID-19. Tell your healthcare provider if you think you were infected but develop signs or symptoms not listed below:  A cough    Shortness of breath or trouble breathing that may become severe    A fever of at least 100.4°F, or 38°C (may be lower in adults 65 or older)    Chills that might include shaking    Muscle pain, body aches, or a headache    A sore throat    Suddenly not being able to taste or smell anything    Feeling very tired (fatigue)    Congestion (stuffy head and nose), or a runny nose    Diarrhea, nausea, or vomiting    Manage your chronic health condition:  If you do not have a regular healthcare provider, experts recommend you contact a local [facility] health center or health department. The following can help you manage your condition and prevent COVID-19 during an active outbreak in your area:  Get emergency care for your condition if needed.  Talk to your healthcare providers about symptoms of your chronic condition that need immediate care. Your providers can help you create a plan or add exacerbation management to your plan. The plan will include when to go to an emergency department and when to call your local emergency number. This will depend on where you live and the services that are available.    Go to dialysis appointments as scheduled.  It is important to stay on schedule. You will need to have enough food to be able to follow the emergency diet plan if you must miss a session. The emergency diet needs to be part of the management plan for your condition.    Ask your healthcare provider for other ways to have appointments.  Some providers offer phone, video, or other types of appointments.    Follow any regular management plan you use.  Your healthcare provider will tell you if you need to make any changes to your regular management  plan. For example, if you have asthma, continue to follow your asthma action plan. If you have diabetes, you may need to check your blood sugar level more often. Stress and illness can make blood sugar levels go up. You may need to adjust medicine such as insulin. If you have a heart condition or high blood pressure, you may need to check your blood pressure more often. Stress and illness can also raise your blood pressure.    Talk to your healthcare providers about your medicines.  You may be able to get more than 1 month of medicine at a time. This will lower the number of times you need to go to a pharmacy to get your medicines. Make sure you have enough medicine if you have a condition that can lead to an emergency. Examples include asthma medicines, insulin, or an epinephrine pen. Check the expiration dates on the medicines you currently have. Ask for refills as soon as possible, if needed. If it is not time to refill prescriptions, you may be able to get an emergency supply of some medicines. Medicine plans vary, so ask your healthcare provider or pharmacist for options.    Have supplies available in your home or delivered as needed.  If possible, get extra supplies you use regularly. Examples include absorbent pads, syringes, and wound cleaning solutions. This will limit the number of trips out of your home to get supplies. Have food, medicines, and other supplies delivered. You can instead ask someone who does not have COVID-19 to get items you need.    What you need to know about COVID-19 vaccines:  Your healthcare provider can give you more information about what to expect, depending on your chronic condition. You are considered fully vaccinated against COVID-19 two weeks after the final dose of any COVID-19 vaccine. Let your healthcare provider know when you have received the final dose of the vaccine. Make a copy of your vaccination card. Keep the original with you in case you need to show it. Keep the  copy in a safe place.  Get a COVID-19 vaccine as directed.  Vaccination is recommended for everyone 6 months or older. COVID-19 vaccines are given as a shot in 1 to 3 doses as a primary series. This depends on the vaccine brand and the age of the person who receives it. A booster dose is recommended for everyone 5 years or older after the primary series is complete. A second booster  is recommended for all adults 50 or older and for immunocompromised adolescents. The second booster is also recommended for anyone who got the 1-dose brand of vaccine for the first dose and a booster. Your provider can give you more information on boosters and help you schedule all needed doses.         Continue to protect yourself and others.  You can become infected even after you get the vaccine. You may also be able to pass the virus to others without knowing you are infected.    After you get the vaccine, check local, national, and international travel rules.  You may need to be tested before you travel. Some countries require proof of a negative test before you travel. You may also need to quarantine after you return.    Medicine may be given to prevent infection.  The medicine can be given if you are at high risk for infection and cannot get the vaccine. It can also be given if your immune system does not respond well to the vaccine.    Lower your risk for COVID-19:   Stay home if you are sick or think you may have COVID-19.  It is important to stay home if you are waiting for a testing appointment or for test results.    Wash your hands often throughout the day.  Use soap and water. Rub your soapy hands together, lacing your fingers, for at least 20 seconds. Rinse with warm, running water. Dry your hands with a clean towel or paper towel. Use hand  that contains alcohol if soap and water are not available. Teach children how to wash their hands and use hand .         Cover sneezes and coughs.  Turn your face  away and cover your mouth and nose with a tissue. Throw the tissue away. Use the bend of your arm if a tissue is not available. Then wash your hands with soap and water or use hand . Teach children how to cover a cough or sneeze.    Wear a face covering (mask) when needed.  Use a cloth covering with at least 2 layers. You can also create layers by putting a cloth covering over a disposable non-medical mask. Cover your mouth and your nose.         Try to keep space between you and others when you are out of the house.  Avoid crowds as much as possible. Wear a face covering when you must be around a large group and cannot keep space between you and others.    Clean and disinfect high-touch surfaces and objects in your home often.  Use disinfecting wipes, or make a solution by mixing 4 teaspoons of bleach with 1 quart (4 cups) of water. Do not  use any cleaning or disinfecting products that can trigger an asthma attack or other breathing problems. Open windows or have circulating air as you clean. Do not  mix ammonia with bleach. This will create toxic fumes.       Help strengthen your immune system:   Ask about other vaccines you may need.  Get the influenza (flu) vaccine as soon as recommended each year, usually starting in September or October. Get the pneumonia vaccine if recommended. Your healthcare provider can tell you if you should also get other vaccines, and when to get them.    Do not smoke.  Nicotine and other chemicals in cigarettes and cigars can increase your risk for infection and for serious COVID-19 effects. Ask your healthcare provider for information if you currently smoke and need help to quit. E-cigarettes or smokeless tobacco still contain nicotine. Talk to your healthcare provider before you use these products.    Eat a variety of healthy foods.  Examples include vegetables, fruits, whole-grain breads and cereals, lean meats and poultry, fish, low-fat dairy products, and cooked beans.  Healthy foods contain nutrients that help keep your immune system strong.         Find ways to manage stress.  Talk to your healthcare providers about ways to manage stress. Stress can lead to breathing problems or trigger an attack or exacerbation of many health conditions. Do things you enjoy to help you relax. For example, talk to a friend, read a book or magazine, or listen to soothing music.    Follow up with your doctor or specialist as directed:  Your providers will tell you when you can come in for tests, procedures, or check-ups. Bring your symptom record with you to all appointments. Write down your questions so you remember to ask them during your visits.  For more information:   Centers for Disease Control and Prevention  [address] [place] [address]  Phone: 4- 442 - [phone]  Phone: 7- 620 - 6812809  Web Address: http://www.cdc.gov    © Copyright [employer]ative 2023 Information is for End User's use only and may not be sold, redistributed or otherwise used for commercial purposes.  The above information is an  only. It is not intended as medical advice for individual conditions or treatments. Talk to your doctor, nurse or pharmacist before following any medical regimen to see if it is safe and effective for you.

## 2023-12-26 NOTE — PROGRESS NOTES
[facility]        NAME: Danny Johnson is a 68 y.o. male  : 1955    MRN: [id number]  DATE: 2023  TIME: 3:33 PM    Assessment and Plan   COVID [U07.1]  1. COVID  molnupiravir (Lagevrio) 200 mg capsule    DISCONTINUED: molnupiravir (Lagevrio) 200 mg capsule        Pt DM2, Afib, 67 yo.  Xareto so paxlovid contraindicated.  Will start molnupiravir. Educated pt to follow [facility] PCP to schedule f/u    Patient came in because his pharmacy did not have the medication, requesting paxlovid.  Contraindicated with Xarelto.  [facility] will [facility].    Patient Instructions     Continue to monitor symptoms.  If new or worsening symptoms develop, go immediately to Er. Drink plenty of fluids.  Follow up with Family Doctor this week.      Chief Complaint     Chief Complaint   Patient presents with    Headache     Headache, cough and congestion x3 days. Tested positive at home for covid but test past expiration date.  Fever  and today         History of Present Illness       Cough  This is a new problem. Episode onset: 3 days ago. The problem has been gradually worsening. The problem occurs every few minutes. The cough is Non-productive. Associated symptoms include chills, headaches, myalgias, nasal congestion, postnasal drip and a sore throat. Pertinent negatives include no chest pain, ear pain, fever, rash, rhinorrhea, shortness of breath, sweats, weight loss or wheezing. Nothing aggravates the symptoms. Treatments tried: OTC medications. The treatment provided moderate relief.       Review of Systems   Review of Systems   Constitutional:  Positive for chills and fatigue. Negative for diaphoresis, fever and weight loss.   HENT:  Positive for congestion, postnasal drip, sinus pressure, sinus pain and sore throat. Negative for ear pain, rhinorrhea, sneezing and trouble swallowing.    Eyes: Negative.    Respiratory:  Positive for cough. Negative for  chest tightness, shortness of breath and wheezing.    Cardiovascular: Negative.  Negative for chest pain and palpitations.   Gastrointestinal:  Negative for abdominal pain, diarrhea, nausea and vomiting.   Endocrine: Negative.    Genitourinary:  Negative for dysuria.   Musculoskeletal:  Positive for myalgias. Negative for back pain, neck pain and neck stiffness.   Skin:  Negative for pallor and rash.   Allergic/Immunologic: Negative.    Neurological:  Positive for headaches. Negative for light-headedness.   Hematological: Negative.    Psychiatric/Behavioral: Negative.           Current Medications       Current Outpatient Medications:     ALPRAZolam (XANAX) 0.25 mg tablet, Take 1 tablet (0.25 mg total) by mouth daily as needed for anxiety, Disp: 30 tablet, Rfl: 0    Cartia  MG 24 hr capsule, Take by mouth 2 (two) times a day, Disp: , Rfl:     escitalopram (LEXAPRO) 10 mg tablet, take 1 tablet by mouth once daily (Patient taking differently: Take 5 mg by mouth daily), Disp: 90 tablet, Rfl: 5    fenofibrate (TRICOR) 145 mg tablet, take 1 tablet by mouth daily, Disp: 90 tablet, Rfl: 3    flecainide (TAMBOCOR) 100 mg tablet, Take 100 mg by mouth 2 (two) times a day, Disp: , Rfl:     glucose blood (OneTouch Ultra) test strip, TEST DAILY, Disp: 100 strip, Rfl: 3    metFORMIN (GLUCOPHAGE) 500 mg tablet, Take 1 tablet (500 mg total) by mouth 2 (two) times a day with meals, Disp: 180 tablet, Rfl: 3    molnupiravir (Lagevrio) 200 mg capsule, Take 4 capsules (800 mg total) by mouth every 12 (twelve) hours for 5 days, Disp: 40 capsule, Rfl: 0    omeprazole (PriLOSEC) 40 MG capsule, Take 1 capsule (40 mg total) by mouth daily, Disp: 90 capsule, Rfl: 3    rosuvastatin (CRESTOR) 40 MG tablet, take 1 tablet by mouth once daily, Disp: 90 tablet, Rfl: 3    tobramycin (TOBREX) 0.3 % OINT, Administer 0.5 inches to both eyes 3 (three) times a day, Disp: 3.5 g, Rfl: 0    traMADol (ULTRAM) 50 mg tablet, Take 1 tablet (50 mg total)  by mouth every 6 (six) hours as needed for moderate pain, Disp: 90 tablet, Rfl: 1    Xarelto 20 MG tablet, Take 1 tablet (20 mg total) by mouth daily with breakfast, Disp: 90 tablet, Rfl: 3    Current Allergies     Allergies as of 12/26/2023    (No Known Allergies)            The following portions of the patient's history were reviewed and updated as appropriate: allergies, current medications, past family history, past medical history, past social history, past surgical history and problem list.     Past Medical History:   Diagnosis Date    Arthritis     Atrial fibrillation (HCC)     Candidiasis, cutaneous     Colon polyp     Colon, diverticulosis     CPAP (continuous positive airway pressure) dependence     Depression     Diabetes mellitus (HCC)     Diverticulosis     GERD (gastroesophageal reflux disease)     Hyperlipidemia     Hypertension     Internal hemorrhoids     Irregular heart beat     aFIB    Pneumonia     Shortness of breath     Sleep apnea     uses cpap    Vertigo of central origin        Past Surgical History:   Procedure Laterality Date    CARDIAC ELECTROPHYSIOLOGY MAPPING AND ABLATION      COLONOSCOPY      EGD AND COLONOSCOPY      OK COLONOSCOPY FLX DX W/COLLJ SPEC WHEN PFRMD N/A 8/29/2018    Procedure: COLONOSCOPY;  Surgeon: Praveen Parrish MD;  Location: [facility];  Service: Gastroenterology    OK ESOPHAGOGASTRODUODENOSCOPY TRANSORAL DIAGNOSTIC N/A 10/10/2017    Procedure: ESOPHAGOGASTRODUODENOSCOPY (EGD);  Surgeon: Praveen Parrish MD;  Location: [facility];  Service: Gastroenterology    TONSILLECTOMY         Family History   Problem Relation Age of Onset    Other Mother         CABG,Hepatic Disorder    Coronary artery disease Mother     Other Father         CABG    Diabetes Father     Cancer Family         Gastrointestinal         Medications have been verified.        Objective   /78   Pulse 100   Temp 99.9 °F (37.7 °C)   Resp 18   SpO2 95%        Physical Exam     Physical  Exam  Vitals and nursing note reviewed.   Constitutional:       General: He is not in acute distress.     Appearance: Normal appearance. He is well-developed. He is not ill-appearing or diaphoretic.   HENT:      Head: Normocephalic and atraumatic.      Right Ear: Tympanic membrane, ear canal and external ear normal.      Left Ear: Tympanic membrane, ear canal and external ear normal.      Nose: Congestion present. No rhinorrhea.      Mouth/Throat:      Pharynx: Posterior oropharyngeal erythema present. No oropharyngeal exudate.   Eyes:      General:         Right eye: No discharge.         Left eye: No discharge.      Conjunctiva/sclera: Conjunctivae normal.   Cardiovascular:      Rate and Rhythm: Normal rate and regular rhythm.      Heart sounds: Normal heart sounds.   Pulmonary:      Effort: Pulmonary effort is normal. No respiratory distress.      Breath sounds: Normal breath sounds. No wheezing, rhonchi or rales.   Musculoskeletal:      Cervical back: Normal range of motion and neck supple.   Lymphadenopathy:      Cervical: No cervical adenopathy.   Skin:     General: Skin is warm.      Capillary Refill: Capillary refill takes less than 2 seconds.      Findings: No rash.   Neurological:      Mental Status: He is alert.

## 2023-12-31 DIAGNOSIS — E78.00 HIGH CHOLESTEROL: ICD-10-CM

## 2023-12-31 DIAGNOSIS — E11.9 CONTROLLED TYPE 2 DIABETES MELLITUS WITHOUT COMPLICATION, WITHOUT LONG-TERM CURRENT USE OF INSULIN (HCC): ICD-10-CM

## 2024-01-02 RX ORDER — ROSUVASTATIN CALCIUM 40 MG/1
TABLET, COATED ORAL
Qty: 90 TABLET | Refills: 3 | Status: SHIPPED | OUTPATIENT
Start: 2024-01-02

## 2024-04-09 DIAGNOSIS — F32.A DEPRESSION, UNSPECIFIED DEPRESSION TYPE: ICD-10-CM

## 2024-04-09 RX ORDER — ESCITALOPRAM OXALATE 10 MG/1
TABLET ORAL
Qty: 90 TABLET | Refills: 1 | Status: SHIPPED | OUTPATIENT
Start: 2024-04-09

## 2024-04-22 ENCOUNTER — CONSULT (OUTPATIENT)
Age: 69
End: 2024-04-22
Payer: COMMERCIAL

## 2024-04-22 VITALS — HEIGHT: 68 IN | BODY MASS INDEX: 30.92 KG/M2 | WEIGHT: 204 LBS | TEMPERATURE: 97.9 F

## 2024-04-22 DIAGNOSIS — L98.9 SKIN LESION OF CHEEK: ICD-10-CM

## 2024-04-22 DIAGNOSIS — L73.8 SEBACEOUS HYPERPLASIA: ICD-10-CM

## 2024-04-22 DIAGNOSIS — D18.01 CHERRY ANGIOMA: ICD-10-CM

## 2024-04-22 DIAGNOSIS — L82.1 SEBORRHEIC KERATOSIS: ICD-10-CM

## 2024-04-22 DIAGNOSIS — D22.9 MULTIPLE NEVI: ICD-10-CM

## 2024-04-22 DIAGNOSIS — Z13.89 SCREENING FOR SKIN CONDITION: Primary | ICD-10-CM

## 2024-04-22 PROCEDURE — 99203 OFFICE O/P NEW LOW 30 MIN: CPT | Performed by: DERMATOLOGY

## 2024-04-22 NOTE — PATIENT INSTRUCTIONS
SEBACEOUS HYPERPLASIA    Physical Exam:  Anatomic Location Affected:  face  Morphological Description:  multiple yellowish papules, 5mm yellowish umbilicated papule - Left Cheek  Pertinent Positives:  Pertinent Negatives:    Additional History of Present Condition:  present for years, discussed lesion 4 years ago at last skin check    Assessment and Plan:  Based on a thorough discussion of this condition and the management approach to it (including a comprehensive discussion of the known risks, side effects and potential benefits of treatment), the patient (family) agrees to implement the following specific plan:  Benign and reassured    Sebaceous Hyperplasia  Sebaceous hyperplasia is a common, benign condition of enlarged oil secreting (sebaceous) glands commonly found on the forehead and cheeks of middle-aged and elderly patients. They normally appear as small yellow bumps up to 3mm in diameter that can be single or multiple. The bumps on the face often display a centrall dell. Occasionally, these bumps can occur on the chest, areola, mouth, and genitals. Rarely, they can grow to take a giant form, or be arranged linearly.     Causes of sebaceous hyperplasia  Sebaceous hyperplasic is a form of benign hair follicle tumor and can often be confused with basal cell carcinoma. It can be more prevalent in immunosuppressed patients such as those undergoing organ transplantation. In the rare Tae-Lyle syndrome, sebaceous hyperplasia occurs in association with internal cancers.  Lesions of sebaceous hyperplasia are benign, with no known potential for malignant transformation, but they may be associated with nonmelanoma skin cancer in transplantation patients.     How we do diagnose sebaceous hyperplasia?  Your dermatologist may take a closer look at the bumps with a device called a dermatoscope. Common features include a central hair follicle surrounded by yellowish lobules with prominent blood vessels.     What is the  treatment of sebaceous hyperplasia?   Since sebaceous hyperplasia is benign with no known potential for transformation into cancer, treatment is mostly for cosmetic reasons or if the lesions become irritated. Options include   Light electrocautery or laser vaporization  Oral isotrentinoin is effective for extensive or disfiguring spots, but do not prevent recurrence   Antiandrogens may be used in females to decrease the size and improve overall appearance of bumps

## 2024-04-22 NOTE — PROGRESS NOTES
"[facility] Dermatology Clinic Note     Patient Name: aDnny Johnson  Encounter Date: April 22, 2024     Have you been cared for by a [facility] Dermatologist in the last 3 years and, if so, which description applies to you?    Yes.  I have been here within the last 3 years, and my medical history has NOT changed since that time.  I am MALE/not capable of bearing children.    REVIEW OF SYSTEMS:  Have you recently had or currently have any of the following? No changes in my recent health.   PAST MEDICAL HISTORY:  Have you personally ever had or currently have any of the following?  If \"YES,\" then please provide more detail. No changes in my medical history.   HISTORY OF IMMUNOSUPPRESSION: Do you have a history of any of the following:  Systemic Immunosuppression such as Diabetes, Biologic or Immunotherapy, Chemotherapy, Organ Transplantation, Bone Marrow Transplantation?  No     Answering \"YES\" requires the addition of the dotphrase \"IMMUNOSUPPRESSED\" as the first diagnosis of the patient's visit.   FAMILY HISTORY:  Any \"first degree relatives\" (parent, brother, sister, or child) with the following?    No changes in my family's known health.   PATIENT EXPERIENCE:    Do you want the Dermatologist to perform a COMPLETE skin exam today including a clinical examination under the \"bra and underwear\" areas?  Yes  If necessary, do we have your permission to call and leave a detailed message on your Preferred Phone number that includes your specific medical information?  Yes      No Known Allergies   Current Outpatient Medications:     ALPRAZolam (XANAX) 0.25 mg tablet, Take 1 tablet (0.25 mg total) by mouth daily as needed for anxiety, Disp: 30 tablet, Rfl: 0    Cartia  MG 24 hr capsule, Take by mouth 2 (two) times a day, Disp: , Rfl:     escitalopram (LEXAPRO) 10 mg tablet, take 1 tablet by mouth once daily, Disp: 90 tablet, Rfl: 1    fenofibrate (TRICOR) 145 mg tablet, take 1 tablet by mouth daily, Disp: 90 " "tablet, Rfl: 3    flecainide (TAMBOCOR) 100 mg tablet, Take 100 mg by mouth 2 (two) times a day, Disp: , Rfl:     glucose blood (OneTouch Ultra) test strip, TEST DAILY, Disp: 100 strip, Rfl: 3    metFORMIN (GLUCOPHAGE) 500 mg tablet, take 1 tablet by mouth twice a day with meals, Disp: 180 tablet, Rfl: 3    omeprazole (PriLOSEC) 40 MG capsule, Take 1 capsule (40 mg total) by mouth daily, Disp: 90 capsule, Rfl: 3    rosuvastatin (CRESTOR) 40 MG tablet, take 1 tablet by mouth once daily, Disp: 90 tablet, Rfl: 3    tobramycin (TOBREX) 0.3 % OINT, Administer 0.5 inches to both eyes 3 (three) times a day, Disp: 3.5 g, Rfl: 0    traMADol (ULTRAM) 50 mg tablet, Take 1 tablet (50 mg total) by mouth every 6 (six) hours as needed for moderate pain, Disp: 90 tablet, Rfl: 1    Xarelto 20 MG tablet, Take 1 tablet (20 mg total) by mouth daily with breakfast, Disp: 90 tablet, Rfl: 3          Whom besides the patient is providing clinical information about today's encounter?   NO ADDITIONAL HISTORIAN (patient alone provided history)    Physical Exam and Assessment/Plan by Diagnosis:    Chief complaint: Patient is a 68 y/o male present for a routine skin exam without history of skin cancer and has a spot of concern on the Left Cheek that he says was previously removed over 20 years ago and current PROVIDER thinks it is a BCC.    MELANOCYTIC NEVI (\"Moles\")    Physical Exam:  Anatomic Location Affected: Mostly on sun-exposed areas of the trunk and extremities  Morphological Description:  Scattered, 1-4mm round to ovoid, symmetrical-appearing, even bordered, skin colored to dark brown macules/papules, mostly in sun-exposed areas  Pertinent Positives:  Pertinent Negatives:    Additional History of Present Condition:  present on exam    Assessment and Plan:  Based on a thorough discussion of this condition and the management approach to it (including a comprehensive discussion of the known risks, side effects and potential benefits of " "treatment), the patient (family) agrees to implement the following specific plan:  Provided handout with information regarding the ABCDE's of moles   Recommend routine skin exams every year   Sun avoidance, protective clothing (known as UPF clothing), and the use of at least SPF 30 sunscreens is advised. Sunscreen should be reapplied every two hours when outside.     SEBORRHEIC KERATOSIS; NON-INFLAMED    Physical Exam:  Anatomic Location Affected:  scattered across trunk, extremities, face  Morphological Description:  Flat and raised, waxy, smooth to warty textured, yellow to brownish-grey to dark brown to blackish, discrete, \"stuck-on\" appearing papules.  Pertinent Positives:  Pertinent Negatives:    Additional History of Present Condition:  Patient reports new bumps on the skin.  Denies itch, burn, pain, bleeding or ulceration.  Present constantly; nothing seems to make it worse or better.  No prior treatment.      Assessment and Plan:  Based on a thorough discussion of this condition and the management approach to it (including a comprehensive discussion of the known risks, side effects and potential benefits of treatment), the patient (family) agrees to implement the following specific plan:  Reassured benign    ANGIOMA (\"CHERRY ANGIOMA\")    Physical Exam:  Anatomic Location: scattered across sun exposed areas of the trunk and extremities   Morphologic Description: Firm red to reddish-blue discrete papules  Pertinent Positives:  Pertinent Negatives:    Additional History of Present Condition:  Present on exam.     Assessment and Plan:  Reassured benign    SEBACEOUS HYPERPLASIA    Physical Exam:  Anatomic Location Affected:  face  Morphological Description:  multiple yellowish papules, 5mm yellowish umbilicated papule - Left Cheek  Pertinent Positives:  Pertinent Negatives:    Additional History of Present Condition:  present for years, discussed lesion 4 years ago at last skin check    Assessment and " Plan:  Based on a thorough discussion of this condition and the management approach to it (including a comprehensive discussion of the known risks, side effects and potential benefits of treatment), the patient (family) agrees to implement the following specific plan:  Benign and reassured    Sebaceous Hyperplasia  Sebaceous hyperplasia is a common, benign condition of enlarged oil secreting (sebaceous) glands commonly found on the forehead and cheeks of middle-aged and elderly patients. They normally appear as small yellow bumps up to 3mm in diameter that can be single or multiple. The bumps on the face often display a centrall dell. Occasionally, these bumps can occur on the chest, areola, mouth, and genitals. Rarely, they can grow to take a giant form, or be arranged linearly.     Causes of sebaceous hyperplasia  Sebaceous hyperplasic is a form of benign hair follicle tumor and can often be confused with basal cell carcinoma. It can be more prevalent in immunosuppressed patients such as those undergoing organ transplantation. In the rare Tae-Himanshu syndrome, sebaceous hyperplasia occurs in association with internal cancers.  Lesions of sebaceous hyperplasia are benign, with no known potential for malignant transformation, but they may be associated with nonmelanoma skin cancer in transplantation patients.     How we do diagnose sebaceous hyperplasia?  Your dermatologist may take a closer look at the bumps with a device called a dermatoscope. Common features include a central hair follicle surrounded by yellowish lobules with prominent blood vessels.     What is the treatment of sebaceous hyperplasia?   Since sebaceous hyperplasia is benign with no known potential for transformation into cancer, treatment is mostly for cosmetic reasons or if the lesions become irritated. Options include   Light electrocautery or laser vaporization  Oral isotrentinoin is effective for extensive or disfiguring spots, but do not  prevent recurrence   Antiandrogens may be used in females to decrease the size and improve overall appearance of bumps     Scribe Attestation      I,:  Angélica Castellanos am acting as a scribe while in the presence of the attending physician.:       I,:  Patrick Granados MD personally performed the services described in this documentation    as scribed in my presence.:

## 2024-04-28 DIAGNOSIS — E78.00 HIGH CHOLESTEROL: ICD-10-CM

## 2024-04-28 DIAGNOSIS — I48.91 ATRIAL FIBRILLATION, UNSPECIFIED TYPE (HCC): ICD-10-CM

## 2024-04-29 RX ORDER — FENOFIBRATE 145 MG/1
TABLET, COATED ORAL
Qty: 90 TABLET | Refills: 0 | Status: SHIPPED | OUTPATIENT
Start: 2024-04-29

## 2024-05-04 ENCOUNTER — APPOINTMENT (OUTPATIENT)
Dept: LAB | Facility: CLINIC | Age: 69
End: 2024-05-04
Payer: COMMERCIAL

## 2024-05-04 DIAGNOSIS — E11.9 CONTROLLED TYPE 2 DIABETES MELLITUS WITHOUT COMPLICATION, WITHOUT LONG-TERM CURRENT USE OF INSULIN (HCC): ICD-10-CM

## 2024-05-04 DIAGNOSIS — E78.2 MIXED HYPERLIPIDEMIA: ICD-10-CM

## 2024-05-04 LAB
ALBUMIN SERPL BCP-MCNC: 4.3 G/DL (ref 3.5–5)
ALP SERPL-CCNC: 31 U/L (ref 34–104)
ALT SERPL W P-5'-P-CCNC: 15 U/L (ref 7–52)
ANION GAP SERPL CALCULATED.3IONS-SCNC: 9 MMOL/L (ref 4–13)
AST SERPL W P-5'-P-CCNC: 25 U/L (ref 13–39)
BILIRUB SERPL-MCNC: 0.34 MG/DL (ref 0.2–1)
BUN SERPL-MCNC: 21 MG/DL (ref 5–25)
CALCIUM SERPL-MCNC: 8.9 MG/DL (ref 8.4–10.2)
CHLORIDE SERPL-SCNC: 107 MMOL/L (ref 96–108)
CHOLEST SERPL-MCNC: 131 MG/DL
CO2 SERPL-SCNC: 26 MMOL/L (ref 21–32)
CREAT SERPL-MCNC: 1.1 MG/DL (ref 0.6–1.3)
CREAT UR-MCNC: 166.6 MG/DL
EST. AVERAGE GLUCOSE BLD GHB EST-MCNC: 140 MG/DL
GFR SERPL CREATININE-BSD FRML MDRD: 68 ML/MIN/1.73SQ M
GLUCOSE P FAST SERPL-MCNC: 112 MG/DL (ref 65–99)
HBA1C MFR BLD: 6.5 %
HDLC SERPL-MCNC: 41 MG/DL
LDLC SERPL CALC-MCNC: 71 MG/DL (ref 0–100)
MICROALBUMIN UR-MCNC: 9.6 MG/L
MICROALBUMIN/CREAT 24H UR: 6 MG/G CREATININE (ref 0–30)
POTASSIUM SERPL-SCNC: 3.8 MMOL/L (ref 3.5–5.3)
PROT SERPL-MCNC: 6.5 G/DL (ref 6.4–8.4)
SODIUM SERPL-SCNC: 142 MMOL/L (ref 135–147)
TRIGL SERPL-MCNC: 93 MG/DL

## 2024-05-04 PROCEDURE — 82570 ASSAY OF URINE CREATININE: CPT

## 2024-05-04 PROCEDURE — 36415 COLL VENOUS BLD VENIPUNCTURE: CPT

## 2024-05-04 PROCEDURE — 80053 COMPREHEN METABOLIC PANEL: CPT

## 2024-05-04 PROCEDURE — 3044F HG A1C LEVEL LT 7.0%: CPT | Performed by: DERMATOLOGY

## 2024-05-04 PROCEDURE — 80061 LIPID PANEL: CPT

## 2024-05-04 PROCEDURE — 82043 UR ALBUMIN QUANTITATIVE: CPT

## 2024-05-04 PROCEDURE — 83036 HEMOGLOBIN GLYCOSYLATED A1C: CPT

## 2024-05-05 ENCOUNTER — RA CDI HCC (OUTPATIENT)
Dept: OTHER | Facility: HOSPITAL | Age: 69
End: 2024-05-05

## 2024-05-10 ENCOUNTER — TELEPHONE (OUTPATIENT)
Dept: ADMINISTRATIVE | Facility: OTHER | Age: 69
End: 2024-05-10

## 2024-05-10 NOTE — TELEPHONE ENCOUNTER
05/10/24 11:09 AM    Patient contacted (left message) to bring Advance Directive, POLST, or Living Will document to next scheduled pcp visit.    Thank you.  Clemencia Brock [facility]

## 2024-05-13 ENCOUNTER — OFFICE VISIT (OUTPATIENT)
Dept: FAMILY MEDICINE CLINIC | Facility: CLINIC | Age: 69
End: 2024-05-13
Payer: COMMERCIAL

## 2024-05-13 VITALS
HEIGHT: 68 IN | TEMPERATURE: 97.8 F | HEART RATE: 56 BPM | SYSTOLIC BLOOD PRESSURE: 156 MMHG | OXYGEN SATURATION: 98 % | DIASTOLIC BLOOD PRESSURE: 64 MMHG | WEIGHT: 210 LBS | BODY MASS INDEX: 31.83 KG/M2

## 2024-05-13 DIAGNOSIS — T48.5X5A RHINITIS MEDICAMENTOSA: ICD-10-CM

## 2024-05-13 DIAGNOSIS — E11.9 CONTROLLED TYPE 2 DIABETES MELLITUS WITHOUT COMPLICATION, WITHOUT LONG-TERM CURRENT USE OF INSULIN (HCC): Primary | ICD-10-CM

## 2024-05-13 DIAGNOSIS — Z00.00 MEDICARE ANNUAL WELLNESS VISIT, SUBSEQUENT: ICD-10-CM

## 2024-05-13 DIAGNOSIS — E66.09 CLASS 1 OBESITY DUE TO EXCESS CALORIES WITH SERIOUS COMORBIDITY AND BODY MASS INDEX (BMI) OF 32.0 TO 32.9 IN ADULT: ICD-10-CM

## 2024-05-13 DIAGNOSIS — E78.2 MIXED HYPERLIPIDEMIA: ICD-10-CM

## 2024-05-13 DIAGNOSIS — K21.9 GASTROESOPHAGEAL REFLUX DISEASE, UNSPECIFIED WHETHER ESOPHAGITIS PRESENT: ICD-10-CM

## 2024-05-13 DIAGNOSIS — M76.61 ACHILLES TENDINITIS OF RIGHT LOWER EXTREMITY: ICD-10-CM

## 2024-05-13 DIAGNOSIS — F11.20 CONTINUOUS OPIOID DEPENDENCE (HCC): ICD-10-CM

## 2024-05-13 DIAGNOSIS — I48.91 ATRIAL FIBRILLATION, UNSPECIFIED TYPE (HCC): ICD-10-CM

## 2024-05-13 DIAGNOSIS — F33.0 MILD EPISODE OF RECURRENT MAJOR DEPRESSIVE DISORDER (HCC): ICD-10-CM

## 2024-05-13 DIAGNOSIS — J31.0 RHINITIS MEDICAMENTOSA: ICD-10-CM

## 2024-05-13 DIAGNOSIS — Z12.5 SCREENING PSA (PROSTATE SPECIFIC ANTIGEN): ICD-10-CM

## 2024-05-13 PROCEDURE — G0439 PPPS, SUBSEQ VISIT: HCPCS | Performed by: FAMILY MEDICINE

## 2024-05-13 PROCEDURE — 99214 OFFICE O/P EST MOD 30 MIN: CPT | Performed by: FAMILY MEDICINE

## 2024-05-13 NOTE — ASSESSMENT & PLAN NOTE
Lab Results   Component Value Date    HGBA1C 6.5 (H) 05/04/2024   A1c stable on Metformin - continue same regimen.

## 2024-05-13 NOTE — ASSESSMENT & PLAN NOTE
Pt sees cardiology and is on Cartia, Flecainide, and Xarelto. Advised to contact cardiology as he is having symptoms

## 2024-05-13 NOTE — PROGRESS NOTES
Assessment and Plan:     Problem List Items Addressed This Visit        Cardiovascular and Mediastinum    Atrial fibrillation (HCC)     Pt sees cardiology and is on Cartia, Flecainide, and Xarelto. Advised to contact cardiology as he is having symptoms          Relevant Orders    CBC and differential    TSH, 3rd generation with Free T4 reflex       Respiratory    Rhinitis medicamentosa     Advised to wean off Afrin, use Flonase instead             Digestive    Gastroesophageal reflux disease     Stable on Omeprazole             Endocrine    Controlled type 2 diabetes mellitus (HCC) - Primary       Lab Results   Component Value Date    HGBA1C 6.5 (H) 05/04/2024   A1c stable on Metformin - continue same regimen.         Relevant Medications    metFORMIN (GLUCOPHAGE) 500 mg tablet    Other Relevant Orders    Hemoglobin A1C    Basic metabolic panel    Lipid Panel with Direct LDL reflex       Musculoskeletal and Integument    Achilles tendinitis of right lower extremity     Advised ice, stretching. Cannot take NSAIDs (on Xarelto)  If not improving, will refer to Podiatry             Behavioral Health    Continuous opioid dependence (HCC)     Very rarely uses Tramadol. Last filled in 2021.         Mild episode of recurrent major depressive disorder (HCC)     Stable on Lexapro            Other    Mixed hyperlipidemia     Stable on statin therapy-continue Crestor          Class 1 obesity due to excess calories with serious comorbidity and body mass index (BMI) of 32.0 to 32.9 in adult     Weight loss encouraged        Other Visit Diagnoses     Medicare annual wellness visit, subsequent        Screening PSA (prostate specific antigen)        Relevant Orders    PSA, Total Screen           Preventive health issues were discussed with patient, and age appropriate screening tests were ordered as noted in patient's After Visit Summary.  Personalized health advice and appropriate referrals for health education or preventive  "services given if needed, as noted in patient's After Visit Summary.     History of Present Illness:     Patient presents for a Medicare Wellness Visit    69 year old here for lab review  DM - A1c 6.5%, is on Metformin, was on 500 mg BID, he has increased on his own to 4899-3497 mg per day. He feels that high blood sugar is triggering A Fib. Does not check sugars when the palpitations happen.  Fasting glucoses are 120-170 per patient.     A Fib- he states he has episodes of A Fib related to a \"change in the blood sugar\". He increased the Metformin on his own to 7777-0845 mg daily.  He is on Cartia and Flecainide. He sees Dr. San at [facility] ([facility]  Occasionally has \"soreness\" in the chest related to taking the Flecainide he believes.   He does have LUKE and wears CPAP. Currently asymptomatic and is in NSR on exam.    He reports chronic nasal congestion, uses Afrin every night to open up nose.     He has pain in the shoulders and has seen ortho in the past - may revisit this.  He also complains of pain in the Achilles tendon R heel.       Patient Care Team:  Sandra Rosenthal MD as PCP - General (Family Medicine)  MD Lizeth Lomeli MD Stephen Strohlein, MD as Endoscopist     Review of Systems:     Review of Systems   Constitutional:  Negative for activity change, appetite change, fatigue and unexpected weight change.   HENT:  Positive for congestion.    Respiratory:  Positive for cough. Negative for chest tightness and shortness of breath.    Cardiovascular:  Positive for palpitations. Negative for chest pain and leg swelling.   Gastrointestinal:  Negative for abdominal pain.        GERD   Musculoskeletal:  Positive for arthralgias (shoulders, achilles tendon R foot).   Neurological:  Negative for headaches.        Problem List:     Patient Active Problem List   Diagnosis   • Anxiety   • Benign essential hypertension   • Controlled type 2 diabetes mellitus (HCC)   • Atrial fibrillation " (HCC)   • Mixed hyperlipidemia   • Chronic anticoagulation   • Gastroesophageal reflux disease   • Chronic vertigo   • Class 1 obesity due to excess calories with serious comorbidity and body mass index (BMI) of 32.0 to 32.9 in adult   • Diastasis recti   • Rhinitis medicamentosa   • Ocular migraine   • Continuous opioid dependence (HCC)   • Mild episode of recurrent major depressive disorder (HCC)   • Skin lesion of cheek   • Achilles tendinitis of right lower extremity      Past Medical and Surgical History:     Past Medical History:   Diagnosis Date   • Arthritis    • Atrial fibrillation (HCC)    • Candidiasis, cutaneous    • Colon polyp    • Colon, diverticulosis    • CPAP (continuous positive airway pressure) dependence    • Depression    • Diabetes mellitus (HCC)    • Diverticulosis    • GERD (gastroesophageal reflux disease)    • Hyperlipidemia    • Hypertension    • Internal hemorrhoids    • Irregular heart beat     aFIB   • Pneumonia    • Shortness of breath    • Sleep apnea     uses cpap   • Vertigo of central origin      Past Surgical History:   Procedure Laterality Date   • CARDIAC ELECTROPHYSIOLOGY MAPPING AND ABLATION     • COLONOSCOPY     • EGD AND COLONOSCOPY     • WI COLONOSCOPY FLX DX W/COLLJ SPEC WHEN PFRMD N/A 8/29/2018    Procedure: COLONOSCOPY;  Surgeon: Praveen Parrish MD;  Location: [facility];  Service: Gastroenterology   • WI ESOPHAGOGASTRODUODENOSCOPY TRANSORAL DIAGNOSTIC N/A 10/10/2017    Procedure: ESOPHAGOGASTRODUODENOSCOPY (EGD);  Surgeon: Praveen Parrish MD;  Location: [facility];  Service: Gastroenterology   • TONSILLECTOMY        Family History:     Family History   Problem Relation Age of Onset   • Other Mother         CABG,Hepatic Disorder   • Coronary artery disease Mother    • Other Father         CABG   • Diabetes Father    • Cancer Family         Gastrointestinal      Social History:     Social History     Socioeconomic History   • Marital status: /Civil Union      Spouse name: None   • Number of children: None   • Years of education: None   • Highest education level: None   Occupational History   • None   Tobacco Use   • Smoking status: Former     Current packs/day: 0.00     Average packs/day: 1 pack/day for 12.4 years (12.4 ttl pk-yrs)     Types: Cigarettes     Start date: 1962     Quit date: 10/10/1974     Years since quittin.6   • Smokeless tobacco: Never   Vaping Use   • Vaping status: Never Used   Substance and Sexual Activity   • Alcohol use: Yes     Comment: RARE   • Drug use: No   • Sexual activity: Not Currently   Other Topics Concern   • None   Social History Narrative    Under stress     Social Determinants of Health     Financial Resource Strain: Low Risk  (2023)    Overall Financial Resource Strain (CARDIA)    • Difficulty of Paying Living Expenses: Not very hard   Food Insecurity: No Food Insecurity (2024)    Hunger Vital Sign    • Worried About Running Out of Food in the Last Year: Never true    • Ran Out of Food in the Last Year: Never true   Transportation Needs: No Transportation Needs (2024)    PRAPARE - Transportation    • Lack of Transportation (Medical): No    • Lack of Transportation (Non-Medical): No   Physical Activity: Insufficiently Active (2020)    Exercise Vital Sign    • Days of Exercise per Week: 1 day    • Minutes of Exercise per Session: 20 min   Stress: Stress Concern Present (2020)    [place] [place] [employer] - Occupational Stress Questionnaire    • Feeling of Stress : To some extent   Social Connections: Not on file   Intimate Partner Violence: Not on file   Housing Stability: Low Risk  (2024)    Housing Stability Vital Sign    • Unable to Pay for Housing in the Last Year: No    • Number of Places Lived in the Last Year: 1    • Unstable Housing in the Last Year: No      Medications and Allergies:     Current Outpatient Medications   Medication Sig Dispense Refill   • escitalopram  (LEXAPRO) 10 mg tablet take 1 tablet by mouth once daily (Patient taking differently: 5 mg) 90 tablet 1   • metFORMIN (GLUCOPHAGE) 500 mg tablet Take 2 tablets (1,000 mg total) by mouth 2 (two) times a day with meals 360 tablet 3   • ALPRAZolam (XANAX) 0.25 mg tablet Take 1 tablet (0.25 mg total) by mouth daily as needed for anxiety 30 tablet 0   • Cartia  MG 24 hr capsule Take by mouth 2 (two) times a day     • fenofibrate (TRICOR) 145 mg tablet take 1 tablet by mouth daily 90 tablet 0   • flecainide (TAMBOCOR) 100 mg tablet Take 100 mg by mouth 2 (two) times a day     • glucose blood (OneTouch Ultra) test strip TEST DAILY 100 strip 3   • omeprazole (PriLOSEC) 40 MG capsule Take 1 capsule (40 mg total) by mouth daily 90 capsule 3   • rosuvastatin (CRESTOR) 40 MG tablet take 1 tablet by mouth once daily 90 tablet 3   • tobramycin (TOBREX) 0.3 % OINT Administer 0.5 inches to both eyes 3 (three) times a day 3.5 g 0   • traMADol (ULTRAM) 50 mg tablet Take 1 tablet (50 mg total) by mouth every 6 (six) hours as needed for moderate pain 90 tablet 1   • Xarelto 20 MG tablet Take 1 tablet (20 mg total) by mouth daily with breakfast 90 tablet 3     No current facility-administered medications for this visit.     No Known Allergies   Immunizations:     Immunization History   Administered Date(s) Administered   • COVID-19 MODERNA VACC 0.5 ML IM 03/16/2021, 04/13/2021, 11/03/2021, 05/09/2022   • COVID-19 Moderna mRNA Vaccine 12 Yr+ 50 mcg/0.5 mL (Spikevax) 10/12/2023   • COVID-19 Pfizer Vac BIVALENT Shay-sucrose 12 Yr+ IM 10/09/2022   • INFLUENZA 09/25/2014, 08/16/2016, 08/17/2017, 08/14/2018, 08/31/2021, 08/30/2022   • Influenza Injectable, MDCK, Preservative Free, Quadrivalent, 0.5 mL 08/30/2019   • Influenza, injectable, quadrivalent, preservative free 0.5 mL 08/14/2018, 09/03/2020   • Influenza, seasonal, injectable 10/10/2013, 09/01/2016   • Influenza, seasonal, injectable, preservative free 09/16/2015   •  Pneumococcal Conjugate 13-Valent 05/06/2016   • Pneumococcal Conjugate Vaccine 20-valent (Pcv20), Polysace 11/07/2022   • Pneumococcal Polysaccharide PPV23 1955, 08/17/2017   • Tdap 1955, 05/06/2016   • Zoster 1955, 05/06/2016   • Zoster Vaccine Recombinant 09/12/2019, 02/12/2020      Health Maintenance:         Topic Date Due   • Colorectal Cancer Screening  10/15/2028   • Hepatitis C Screening  Completed         Topic Date Due   • COVID-19 Vaccine (7 - 2023-24 season) 12/07/2023      Medicare Screening Tests and Risk Assessments:     Danny is here for his Subsequent Wellness visit. Last Medicare Wellness visit information reviewed, patient interviewed and updates made to the record today.      Health Risk Assessment:   Patient rates overall health as good. Patient feels that their physical health rating is same. Patient is very satisfied with their life. Eyesight was rated as same. Hearing was rated as same. Patient feels that their emotional and mental health rating is same. Patients states they are never, rarely angry. Patient states they are never, rarely unusually tired/fatigued. Pain experienced in the last 7 days has been none. Patient states that he has experienced no weight loss or gain in last 6 months. Occasionally has pain in right shoulder blade, feels sore  Wears Hearing Aides B/L      Fall Risk Screening:   In the past year, patient has experienced: no history of falling in past year      Home Safety:  Patient does not have trouble with stairs inside or outside of their home. Patient has working smoke alarms and has working carbon monoxide detector. Home safety hazards include: none.     Nutrition:   Current diet is Regular and Diabetic.     Medications:   Patient is currently taking over-the-counter supplements. OTC medications include: see medication list. Patient is able to manage medications. Occasionally takes tramadol    Activities of Daily Living (ADLs)/Instrumental Activities  of Daily Living (IADLs):   Walk and transfer into and out of bed and chair?: Yes  Dress and groom yourself?: Yes    Bathe or shower yourself?: Yes    Feed yourself? Yes  Do your laundry/housekeeping?: Yes  Manage your money, pay your bills and track your expenses?: Yes  Make your own meals?: Yes    Do your own shopping?: Yes    Previous Hospitalizations:   Any hospitalizations or ED visits within the last 12 months?: No      Advance Care Planning:   Living will: Yes    Advanced directive: Yes      Cognitive Screening:   Provider or family/friend/caregiver concerned regarding cognition?: No    PREVENTIVE SCREENINGS      Cardiovascular Screening:    General: Screening Not Indicated, History Lipid Disorder, Risks and Benefits Discussed and Screening Current      Diabetes Screening:     General: Screening Not Indicated, History Diabetes, Risks and Benefits Discussed and Screening Current      Colorectal Cancer Screening:     General: Screening Current      Prostate Cancer Screening:    General: Risks and Benefits Discussed    Due for: PSA      Osteoporosis Screening:    General: Screening Not Indicated      Abdominal Aortic Aneurysm (AAA) Screening:    Risk factors include: age between 65-74 yo and tobacco use        General: Risks and Benefits Discussed and Screening Current      Lung Cancer Screening:     General: Screening Not Indicated      Hepatitis C Screening:    General: Screening Current    Screening, Brief Intervention, and Referral to Treatment (SBIRT)    Screening  Typical number of drinks in a day: 0  Typical number of drinks in a week: 0  Interpretation: Low risk drinking behavior.    Single Item Drug Screening:  How often have you used an illegal drug (including marijuana) or a prescription medication for non-medical reasons in the past year? never    Single Item Drug Screen Score: 0  Interpretation: Negative screen for possible drug use disorder    Brief Intervention  Alcohol & drug use screenings were  "reviewed. No concerns regarding substance use disorder identified.     Review of Current Opioid Use    Opioid Risk Tool (ORT) Interpretation: Complete Opioid Risk Tool (ORT)    Other Counseling Topics:   Regular weightbearing exercise.     No results found.     Physical Exam:     /64   Pulse 56   Temp 97.8 °F (36.6 °C)   Ht 5' 8\" (1.727 m)   Wt 95.3 kg (210 lb)   SpO2 98%   BMI 31.93 kg/m²     Physical Exam  Vitals and nursing note reviewed.   Constitutional:       General: He is not in acute distress.     Appearance: He is well-developed. He is not diaphoretic.   HENT:      Head: Normocephalic and atraumatic.      Right Ear: External ear normal.      Left Ear: External ear normal.      Ears:      Comments: Hearing aids     Mouth/Throat:      Comments: Mask in place  Cardiovascular:      Rate and Rhythm: Normal rate and regular rhythm.      Heart sounds: Normal heart sounds. No murmur heard.     No friction rub. No gallop.   Pulmonary:      Effort: Pulmonary effort is normal. No respiratory distress.      Breath sounds: Normal breath sounds. No stridor. No wheezing or rales.   Musculoskeletal:         General: No swelling.      Left shoulder: Tenderness present.      Right lower leg: No edema.      Left lower leg: No edema.      Right ankle: Normal range of motion.      Right Achilles Tendon: Tenderness present. No defects.   Skin:     Findings: No rash.   Neurological:      General: No focal deficit present.      Mental Status: He is alert. Mental status is at baseline.   Psychiatric:         Mood and Affect: Mood normal.         Behavior: Behavior normal.         Thought Content: Thought content normal.         Judgment: Judgment normal.          Sandra Rosenthal MD  "

## 2024-05-13 NOTE — PATIENT INSTRUCTIONS
Medicare Preventive Visit Patient Instructions  Thank you for completing your Welcome to Medicare Visit or Medicare Annual Wellness Visit today. Your next wellness visit will be due in one year (5/14/2025).  The screening/preventive services that you may require over the next 5-10 years are detailed below. Some tests may not apply to you based off risk factors and/or age. Screening tests ordered at today's visit but not completed yet may show as past due. Also, please note that scanned in results may not display below.  Preventive Screenings:  Service Recommendations Previous Testing/Comments   Colorectal Cancer Screening  Colonoscopy    Fecal Occult Blood Test (FOBT)/Fecal Immunochemical Test (FIT)  Fecal DNA/Cologuard Test  Flexible Sigmoidoscopy Age: 45-75 years old   Colonoscopy: every 10 years (May be performed more frequently if at higher risk)  OR  FOBT/FIT: every 1 year  OR  Cologuard: every 3 years  OR  Sigmoidoscopy: every 5 years  Screening may be recommended earlier than age 45 if at higher risk for colorectal cancer. Also, an individualized decision between you and your healthcare provider will decide whether screening between the ages of 76-85 would be appropriate. Colonoscopy: 10/17/2023  FOBT/FIT: Not on file  Cologuard: Not on file  Sigmoidoscopy: Not on file    Screening Current     Prostate Cancer Screening Individualized decision between patient and health care provider in men between ages of 55-69   Medicare will cover every 12 months beginning on the day after your 50th birthday PSA: 0.5 ng/mL     Risks and Benefits Discussed  Due for PSA     Hepatitis C Screening Once for adults born between 1945 and 1965  More frequently in patients at high risk for Hepatitis C Hep C Antibody: 04/19/2021    Screening Current   Diabetes Screening 1-2 times per year if you're at risk for diabetes or have pre-diabetes Fasting glucose: 112 mg/dL (5/4/2024)  A1C: 6.5 % (5/4/2024)  Screening Not Indicated  History  Diabetes   Cholesterol Screening Once every 5 years if you don't have a lipid disorder. May order more often based on risk factors. Lipid panel: 05/04/2024  Screening Not Indicated  History Lipid Disorder      Other Preventive Screenings Covered by Medicare:  Abdominal Aortic Aneurysm (AAA) Screening: covered once if your at risk. You're considered to be at risk if you have a family history of AAA or a male between the age of 65-75 who smoking at least 100 cigarettes in your lifetime.  Lung Cancer Screening: covers low dose CT scan once per year if you meet all of the following conditions: (1) Age 55-77; (2) No signs or symptoms of lung cancer; (3) Current smoker or have quit smoking within the last 15 years; (4) You have a tobacco smoking history of at least 20 pack years (packs per day x number of years you smoked); (5) You get a written order from a healthcare provider.  Glaucoma Screening: covered annually if you're considered high risk: (1) You have diabetes OR (2) Family history of glaucoma OR (3)  aged 50 and older OR (4)  American aged 65 and older  Osteoporosis Screening: covered every 2 years if you meet one of the following conditions: (1) Have a vertebral abnormality; (2) On glucocorticoid therapy for more than 3 months; (3) Have primary hyperparathyroidism; (4) On osteoporosis medications and need to assess response to drug therapy.  HIV Screening: covered annually if you're between the age of 15-65. Also covered annually if you are younger than 15 and older than 65 with risk factors for HIV infection. For pregnant patients, it is covered up to 3 times per pregnancy.    Immunizations:  Immunization Recommendations   Influenza Vaccine Annual influenza vaccination during flu season is recommended for all persons aged >= 6 months who do not have contraindications   Pneumococcal Vaccine   * Pneumococcal conjugate vaccine = PCV13 (Prevnar 13), PCV15 (Vaxneuvance), PCV20 (Prevnar  20)  * Pneumococcal polysaccharide vaccine = PPSV23 (Pneumovax) Adults 19-65 yo with certain risk factors or if 65+ yo  If never received any pneumonia vaccine: recommend Prevnar 20 (PCV20)  Give PCV20 if previously received 1 dose of PCV13 or PPSV23   Hepatitis B Vaccine 3 dose series if at intermediate or high risk (ex: diabetes, end stage renal disease, liver disease)   Respiratory syncytial virus (RSV) Vaccine - COVERED BY MEDICARE PART D  * RSVPreF3 (Arexvy) CDC recommends that adults 60 years of age and older may receive a single dose of RSV vaccine using shared clinical decision-making (SCDM)   Tetanus (Td) Vaccine - COST NOT COVERED BY MEDICARE PART B Following completion of primary series, a booster dose should be given every 10 years to maintain immunity against tetanus. Td may also be given as tetanus wound prophylaxis.   Tdap Vaccine - COST NOT COVERED BY MEDICARE PART B Recommended at least once for all adults. For pregnant patients, recommended with each pregnancy.   Shingles Vaccine (Shingrix) - COST NOT COVERED BY MEDICARE PART B  2 shot series recommended in those 19 years and older who have or will have weakened immune systems or those 50 years and older     Health Maintenance Due:      Topic Date Due   • Colorectal Cancer Screening  10/15/2028   • Hepatitis C Screening  Completed     Immunizations Due:      Topic Date Due   • COVID-19 Vaccine (7 - 2023-24 season) 12/07/2023     Advance Directives   What are advance directives?  Advance directives are legal documents that state your wishes and plans for medical care. These plans are made ahead of time in case you lose your ability to make decisions for yourself. Advance directives can apply to any medical decision, such as the treatments you want, and if you want to donate organs.   What are the types of advance directives?  There are many types of advance directives, and each state has rules about how to use them. You may choose a combination of  any of the following:  Living will:  This is a written record of the treatment you want. You can also choose which treatments you do not want, which to limit, and which to stop at a certain time. This includes surgery, medicine, IV fluid, and tube feedings.   Durable power of  for healthcare (DPAHC):  This is a written record that states who you want to make healthcare choices for you when you are unable to make them for yourself. This person, called a proxy, is usually a family member or a friend. You may choose more than 1 proxy.  Do not resuscitate (DNR) order:  A DNR order is used in case your heart stops beating or you stop breathing. It is a request not to have certain forms of treatment, such as CPR. A DNR order may be included in other types of advance directives.  Medical directive:  This covers the care that you want if you are in a coma, near death, or unable to make decisions for yourself. You can list the treatments you want for each condition. Treatment may include pain medicine, surgery, blood transfusions, dialysis, IV or tube feedings, and a ventilator (breathing machine).  Values history:  This document has questions about your views, beliefs, and how you feel and think about life. This information can help others choose the care that you would choose.  Why are advance directives important?  An advance directive helps you control your care. Although spoken wishes may be used, it is better to have your wishes written down. Spoken wishes can be misunderstood, or not followed. Treatments may be given even if you do not want them. An advance directive may make it easier for your family to make difficult choices about your care.   Weight Management   Why it is important to manage your weight:  Being overweight increases your risk of health conditions such as heart disease, high blood pressure, type 2 diabetes, and certain types of cancer. It can also increase your risk for osteoarthritis, sleep  apnea, and other respiratory problems. Aim for a slow, steady weight loss. Even a small amount of weight loss can lower your risk of health problems.  How to lose weight safely:  A safe and healthy way to lose weight is to eat fewer calories and get regular exercise. You can lose up about 1 pound a week by decreasing the number of calories you eat by 500 calories each day.   Healthy meal plan for weight management:  A healthy meal plan includes a variety of foods, contains fewer calories, and helps you stay healthy. A healthy meal plan includes the following:  Eat whole-grain foods more often.  A healthy meal plan should contain fiber. Fiber is the part of grains, fruits, and vegetables that is not broken down by your body. Whole-grain foods are healthy and provide extra fiber in your diet. Some examples of whole-grain foods are whole-wheat breads and pastas, oatmeal, brown rice, and bulgur.  Eat a variety of vegetables every day.  Include dark, leafy greens such as spinach, kale, gonzalez greens, and mustard greens. Eat yellow and orange vegetables such as carrots, sweet potatoes, and winter squash.   Eat a variety of fruits every day.  Choose fresh or canned fruit (canned in its own juice or light syrup) instead of juice. Fruit juice has very little or no fiber.  Eat low-fat dairy foods.  Drink fat-free (skim) milk or 1% milk. Eat fat-free yogurt and low-fat cottage cheese. Try low-fat cheeses such as mozzarella and other reduced-fat cheeses.  Choose meat and other protein foods that are low in fat.  Choose beans or other legumes such as split peas or lentils. Choose fish, skinless poultry (chicken or turkey), or lean cuts of red meat (beef or pork). Before you cook meat or poultry, cut off any visible fat.   Use less fat and oil.  Try baking foods instead of frying them. Add less fat, such as margarine, sour cream, regular salad dressing and mayonnaise to foods. Eat fewer high-fat foods. Some examples of high-fat  foods include french fries, doughnuts, ice cream, and cakes.  Eat fewer sweets.  Limit foods and drinks that are high in sugar. This includes candy, cookies, regular soda, and sweetened drinks.  Exercise:  Exercise at least 30 minutes per day on most days of the week. Some examples of exercise include walking, biking, dancing, and swimming. You can also fit in more physical activity by taking the stairs instead of the elevator or parking farther away from stores. Ask your healthcare provider about the best exercise plan for you.   Narcotic (Opioid) Safety    Use narcotics safely:  Take prescribed narcotics exactly as directed  Do not give narcotics to others or take narcotics that belong to someone else  Do not mix narcotics without medicines or alcohol  Do not drive or operate heavy machinery after you take the narcotic  Monitor for side effects and notify your healthcare provider if you experienced side effects such as nausea, sleepiness, itching, or trouble thinking clearly.    Manage constipation:    Constipation is the most common side effect of narcotic medicine. Constipation is when you have hard, dry bowel movements, or you go longer than usual between bowel movements. Tell your healthcare provider about all changes in your bowel movements while you are taking narcotics. He or she may recommend laxative medicine to help you have a bowel movement. He or she may also change the kind of narcotic you are taking, or change when you take it. The following are more ways you can prevent or relieve constipation:    Drink liquids as directed.  You may need to drink extra liquids to help soften and move your bowels. Ask how much liquid to drink each day and which liquids are best for you.  Eat high-fiber foods.  This may help decrease constipation by adding bulk to your bowel movements. High-fiber foods include fruits, vegetables, whole-grain breads and cereals, and beans. Your healthcare provider or dietitian can help  you create a high-fiber meal plan. Your provider may also recommend a fiber supplement if you cannot get enough fiber from food.  Exercise regularly.  Regular physical activity can help stimulate your intestines. Walking is a good exercise to prevent or relieve constipation. Ask which exercises are best for you.  Schedule a time each day to have a bowel movement.  This may help train your body to have regular bowel movements. Bend forward while you are on the toilet to help move the bowel movement out. Sit on the toilet for at least 10 minutes, even if you do not have a bowel movement.    Store narcotics safely:   Store narcotics where others cannot easily get them.  Keep them in a locked cabinet or secure area. Do not  keep them in a purse or other bag you carry with you. A person may be looking for something else and find the narcotics.  Make sure narcotics are stored out of the reach of children.  A child can easily overdose on narcotics. Narcotics may look like candy to a small child.    The best way to dispose of narcotics:      The laws vary by country and area. In the [place], the best way is to return the narcotics through a take-back program. This program is offered by the [place] Drug Enforcement Agency (VERONICA). The following are options for using the program:  Take the narcotics to a [employer]ICA collection site.  The site is often a law enforcement center. Call your local law enforcement center for scheduled take-back days in your area. You will be given information on where to go if the collection site is in a different location.  Take the narcotics to an approved pharmacy or hospital.  A pharmacy or hospital may be set up as a collection site. You will need to ask if it is a [employer] collection site if you were not directed there. A pharmacy or doctor's office may not be able to take back narcotics unless it is a [employer]ICA site.  Use a mail-back system.  This means you are given containers to put the narcotics into. You  will then mail them in the containers.  Use a take-back drop box.  This is a place to leave the narcotics at any time. People and animals will not be able to get into the box. Your local law enforcement agency can tell you where to find a drop box in your area.    Other ways to manage pain:   Ask your healthcare provider about non-narcotic medicines to control pain.  Nonprescription medicines include NSAIDs (such as ibuprofen) and acetaminophen. Prescription medicines include muscle relaxers, antidepressants, and steroids.  Pain may be managed without any medicines.  Some ways to relieve pain include massage, aromatherapy, or meditation. Physical or occupational therapy may also help.    For more information:   Drug Enforcement Administration  [address] [place] [address]  Phone: 1- 952 - 403-3398  Web Address: https://www.deadiversion.[facility].gov/drug_disposal/     Food and Drug Administration  [address] [place] [address]  Phone: 1- 558 - 482-5493  Web Address: http://www.fda.gov     © Copyright [employer] 2018 Information is for End User's use only and may not be sold, redistributed or otherwise used for commercial purposes. All illustrations and images included in CareNotes® are the copyrighted property of A.D.A.M., Inc. or [employer]

## 2024-05-13 NOTE — ASSESSMENT & PLAN NOTE
Advised ice, stretching. Cannot take NSAIDs (on Xarelto)  If not improving, will refer to Podiatry

## 2024-06-04 LAB
LEFT EYE DIABETIC RETINOPATHY: NORMAL
RIGHT EYE DIABETIC RETINOPATHY: NORMAL

## 2024-07-17 DIAGNOSIS — E11.9 CONTROLLED TYPE 2 DIABETES MELLITUS WITHOUT COMPLICATION, WITHOUT LONG-TERM CURRENT USE OF INSULIN (HCC): ICD-10-CM

## 2024-07-26 DIAGNOSIS — E78.00 HIGH CHOLESTEROL: ICD-10-CM

## 2024-07-26 DIAGNOSIS — I48.91 ATRIAL FIBRILLATION, UNSPECIFIED TYPE (HCC): ICD-10-CM

## 2024-07-26 RX ORDER — FENOFIBRATE 145 MG/1
TABLET, COATED ORAL
Qty: 90 TABLET | Refills: 1 | Status: SHIPPED | OUTPATIENT
Start: 2024-07-26

## 2024-08-19 DIAGNOSIS — K21.9 GASTROESOPHAGEAL REFLUX DISEASE: ICD-10-CM

## 2024-08-20 RX ORDER — OMEPRAZOLE 40 MG/1
40 CAPSULE, DELAYED RELEASE ORAL DAILY
Qty: 100 CAPSULE | Refills: 1 | Status: SHIPPED | OUTPATIENT
Start: 2024-08-20

## 2024-09-23 ENCOUNTER — TELEPHONE (OUTPATIENT)
Dept: FAMILY MEDICINE CLINIC | Facility: CLINIC | Age: 69
End: 2024-09-23

## 2024-09-26 ENCOUNTER — TELEPHONE (OUTPATIENT)
Dept: ADMINISTRATIVE | Facility: OTHER | Age: 69
End: 2024-09-26

## 2024-09-26 NOTE — TELEPHONE ENCOUNTER
09/26/24 11:08 AM    Patient contacted to bring Advance Directive, POLST, or Living Will document to next scheduled pcp visit.[facility] Department left message.    Thank you.  Mariah Villarreal MA  [facility]

## 2024-09-30 ENCOUNTER — OFFICE VISIT (OUTPATIENT)
Dept: FAMILY MEDICINE CLINIC | Facility: CLINIC | Age: 69
End: 2024-09-30
Payer: COMMERCIAL

## 2024-09-30 VITALS
HEIGHT: 68 IN | WEIGHT: 205.8 LBS | BODY MASS INDEX: 31.19 KG/M2 | SYSTOLIC BLOOD PRESSURE: 130 MMHG | TEMPERATURE: 99.2 F | HEART RATE: 57 BPM | OXYGEN SATURATION: 98 % | DIASTOLIC BLOOD PRESSURE: 64 MMHG

## 2024-09-30 DIAGNOSIS — E78.00 HIGH CHOLESTEROL: ICD-10-CM

## 2024-09-30 DIAGNOSIS — E11.9 CONTROLLED TYPE 2 DIABETES MELLITUS WITHOUT COMPLICATION, WITHOUT LONG-TERM CURRENT USE OF INSULIN (HCC): ICD-10-CM

## 2024-09-30 DIAGNOSIS — I48.91 ATRIAL FIBRILLATION, UNSPECIFIED TYPE (HCC): Primary | ICD-10-CM

## 2024-09-30 LAB — SL AMB POCT HEMOGLOBIN AIC: 6.2 (ref ?–6.5)

## 2024-09-30 PROCEDURE — 99214 OFFICE O/P EST MOD 30 MIN: CPT | Performed by: FAMILY MEDICINE

## 2024-09-30 PROCEDURE — 83036 HEMOGLOBIN GLYCOSYLATED A1C: CPT | Performed by: FAMILY MEDICINE

## 2024-09-30 RX ORDER — ATORVASTATIN CALCIUM 40 MG/1
40 TABLET, FILM COATED ORAL DAILY
Qty: 100 TABLET | Refills: 3 | Status: SHIPPED | OUTPATIENT
Start: 2024-09-30

## 2024-09-30 RX ORDER — ATORVASTATIN CALCIUM 80 MG/1
80 TABLET, FILM COATED ORAL DAILY
COMMUNITY
Start: 2024-07-31 | End: 2024-09-30 | Stop reason: SDUPTHER

## 2024-09-30 RX ORDER — BLOOD SUGAR DIAGNOSTIC
1 STRIP MISCELLANEOUS DAILY
Qty: 100 STRIP | Refills: 3 | Status: SHIPPED | OUTPATIENT
Start: 2024-09-30

## 2024-09-30 RX ORDER — ATORVASTATIN CALCIUM 40 MG/1
40 TABLET, FILM COATED ORAL DAILY
Start: 2024-09-30 | End: 2024-09-30 | Stop reason: SDUPTHER

## 2024-09-30 NOTE — ASSESSMENT & PLAN NOTE
Patient currently in NSR  Will place Holter monitor  Continue Flecainide and Cartia XT  Will refer to [facility] - patient would like to switch from current cardiologist.    Orders:    Ambulatory referral to Cardiology; Future

## 2024-09-30 NOTE — ASSESSMENT & PLAN NOTE
A1c well controlled on Metformin.     Lab Results   Component Value Date    HGBA1C 6.2 09/30/2024       Orders:    POCT hemoglobin A1c    glucose blood (OneTouch Ultra) test strip; Use 1 each daily Test

## 2024-09-30 NOTE — PROGRESS NOTES
Ambulatory Visit  Name: Danny Johnson      : 1955      MRN: [id number]  Encounter Provider: Sandra Rosenthal MD  Encounter Date: 2024   Encounter department: [facility]    Assessment & Plan  Atrial fibrillation, unspecified type (HCC)  Patient currently in NSR  Will place Holter monitor  Continue Flecainide and Cartia XT  Will refer to [facility] - patient would like to switch from current cardiologist.    Orders:    Ambulatory referral to Cardiology; Future    Controlled type 2 diabetes mellitus without complication, without long-term current use of insulin (HCC)  A1c well controlled on Metformin.     Lab Results   Component Value Date    HGBA1C 6.2 2024       Orders:    POCT hemoglobin A1c    glucose blood (OneTouch Ultra) test strip; Use 1 each daily Test    High cholesterol  On statin therapy - recently switched from Crestor to Lipitor - continue same - labs due in November.   Orders:    atorvastatin (LIPITOR) 40 mg tablet; Take 1 tablet (40 mg total) by mouth daily       History of Present Illness     69 year old with history of A Fib here states he has been having more symptoms of A Fib recently.  His cardiologist is Dr. San in [place], but he is looking to switch.  He is on Flecainide and Cartia XT.  He says he had an ablation in the past. Had tried to wean off meds but was unsuccessful.  He describes fluttering in his chest. Occasionally has chest pain.    He feels his A Fib is related to his glucose. If his Glucose is high will feel his heart rate go up.   Fasting glucoses are 120s. He is on Metformin 500 mg BID.   A1c today 6.2%  He was also taken off Crestor because he had aches and pain in his arms. He was changed to Atorvastatin 40 mg. Would like refills.          Review of Systems   Constitutional:  Negative for activity change.   Respiratory:  Negative for chest tightness and shortness of breath.    Cardiovascular:  Positive for chest  "pain and palpitations. Negative for leg swelling.   Neurological:  Negative for headaches.   Psychiatric/Behavioral:  Negative for dysphoric mood. The patient is not nervous/anxious.            Objective     /64   Pulse 57   Temp 99.2 °F (37.3 °C)   Ht 5' 8\" (1.727 m)   Wt 93.4 kg (205 lb 12.8 oz)   SpO2 98%   BMI 31.29 kg/m²     Physical Exam  Vitals and nursing note reviewed.   Constitutional:       General: He is not in acute distress.     Appearance: He is well-developed. He is not diaphoretic.   HENT:      Head: Normocephalic and atraumatic.   Cardiovascular:      Rate and Rhythm: Regular rhythm. Bradycardia present.      Pulses: no weak pulses.           Dorsalis pedis pulses are 1+ on the right side and 1+ on the left side.      Heart sounds: Normal heart sounds. No murmur heard.     No friction rub. No gallop.   Pulmonary:      Effort: Pulmonary effort is normal. No respiratory distress.      Breath sounds: Normal breath sounds. No wheezing or rales.   Feet:      Right foot:      Skin integrity: No ulcer, skin breakdown, erythema, warmth, callus or dry skin.      Left foot:      Skin integrity: No ulcer, skin breakdown, erythema, warmth, callus or dry skin.   Neurological:      Mental Status: He is alert and oriented to person, place, and time.   Psychiatric:         Behavior: Behavior normal.         Thought Content: Thought content normal.         Judgment: Judgment normal.     Patient's shoes and socks removed.    Right Foot/Ankle   Right Foot Inspection  Skin Exam: skin normal and skin intact. No dry skin, no warmth, no callus, no erythema, no maceration, no abnormal color, no pre-ulcer, no ulcer and no callus.     Toe Exam: ROM and strength within normal limits. No swelling, no tenderness, erythema and  no right toe deformity    Sensory   Vibration: intact  Monofilament testing: intact    Vascular  Capillary refills: < 3 seconds  The right DP pulse is 1+.     Left Foot/Ankle  Left Foot " Inspection  Skin Exam: skin normal and skin intact. No dry skin, no warmth, no erythema, no maceration, normal color, no pre-ulcer, no ulcer and no callus.     Toe Exam: ROM and strength within normal limits. No swelling, no tenderness, no erythema and no left toe deformity.     Sensory   Vibration: intact  Monofilament testing: intact    Vascular  Capillary refills: < 3 seconds  The left DP pulse is 1+.     Assign Risk Category  No deformity present  No loss of protective sensation  No weak pulses  Risk: 0

## 2024-10-01 ENCOUNTER — TELEPHONE (OUTPATIENT)
Dept: ADMINISTRATIVE | Facility: OTHER | Age: 69
End: 2024-10-01

## 2024-10-01 NOTE — TELEPHONE ENCOUNTER
----- Message from Sandra Rosenthal MD sent at 9/30/2024  2:39 PM EDT -----  Regarding: eye exam  09/30/24 2:40 PM    Hello, our patient Danny Johnson has had Diabetic Eye Exam completed/performed. Please assist in updating the patient chart by pulling the document from the Media Tab. The date of service is 6/4/24.     Thank you,  Sandra Rosenthal MD  [facility]  
Upon review of the In Basket request we were able to locate, review, and update the patient chart as requested for Diabetic Eye Exam.    Any additional questions or concerns should be emailed to the Practice Liaisons via the appropriate education email address, please do not reply via In Basket.    Thank you  Erik Gonzales MA   [facility]          
3

## 2024-10-08 DIAGNOSIS — F32.A DEPRESSION, UNSPECIFIED DEPRESSION TYPE: ICD-10-CM

## 2024-10-09 RX ORDER — ESCITALOPRAM OXALATE 10 MG/1
TABLET ORAL
Qty: 90 TABLET | Refills: 1 | Status: SHIPPED | OUTPATIENT
Start: 2024-10-09

## 2024-10-14 ENCOUNTER — TELEPHONE (OUTPATIENT)
Dept: FAMILY MEDICINE CLINIC | Facility: CLINIC | Age: 69
End: 2024-10-14

## 2024-10-14 NOTE — TELEPHONE ENCOUNTER
There was some A fib seen on the Holter, longest episode 1 hour. He has known A Fib and is on medication for it.  He should follow-up with cardiology to see if any adjustment in meds needed as we discussed

## 2024-11-15 ENCOUNTER — TELEPHONE (OUTPATIENT)
Dept: ADMINISTRATIVE | Facility: OTHER | Age: 69
End: 2024-11-15

## 2024-11-15 NOTE — TELEPHONE ENCOUNTER
11/15/24 10:12 AM    Patient contacted to bring Advance Directive, POLST, or Living Will document to next scheduled pcp visit.[facility] Department left message.    Thank you.  Mariah Villarreal MA  [facility]

## 2024-11-18 ENCOUNTER — OFFICE VISIT (OUTPATIENT)
Dept: FAMILY MEDICINE CLINIC | Facility: CLINIC | Age: 69
End: 2024-11-18
Payer: COMMERCIAL

## 2024-11-18 VITALS
DIASTOLIC BLOOD PRESSURE: 60 MMHG | SYSTOLIC BLOOD PRESSURE: 130 MMHG | OXYGEN SATURATION: 98 % | WEIGHT: 206.2 LBS | TEMPERATURE: 98.5 F | BODY MASS INDEX: 31.25 KG/M2 | HEART RATE: 64 BPM | HEIGHT: 68 IN

## 2024-11-18 DIAGNOSIS — I48.91 ATRIAL FIBRILLATION, UNSPECIFIED TYPE (HCC): Primary | ICD-10-CM

## 2024-11-18 DIAGNOSIS — I10 BENIGN ESSENTIAL HYPERTENSION: ICD-10-CM

## 2024-11-18 DIAGNOSIS — E11.9 CONTROLLED TYPE 2 DIABETES MELLITUS WITHOUT COMPLICATION, WITHOUT LONG-TERM CURRENT USE OF INSULIN (HCC): ICD-10-CM

## 2024-11-18 PROCEDURE — G2211 COMPLEX E/M VISIT ADD ON: HCPCS | Performed by: FAMILY MEDICINE

## 2024-11-18 PROCEDURE — 99214 OFFICE O/P EST MOD 30 MIN: CPT | Performed by: FAMILY MEDICINE

## 2024-11-18 RX ORDER — DOXYCYCLINE HYCLATE 100 MG
TABLET ORAL
COMMUNITY
Start: 2024-11-15

## 2024-11-18 RX ORDER — AZITHROMYCIN 250 MG/1
TABLET, FILM COATED ORAL
COMMUNITY
Start: 2024-11-15

## 2024-11-18 NOTE — PROGRESS NOTES
Name: Danny Johnson      : 1955      MRN: [id number]  Encounter Provider: Sandra Rosenthal MD  Encounter Date: 2024   Encounter department: [facility]  :  Assessment & Plan  Atrial fibrillation, unspecified type (HCC)  In NSR currently  On Cartia, Flecainide and Xarelto  Still with intermittent symptoms.   Has follow-up with cardiology in January --defer further management to Cardio       Controlled type 2 diabetes mellitus without complication, without long-term current use of insulin (HCC)  A1c has been well controlled. He plans to do his labs in the near future. Continue Metformin.  Repeat labs in 6 months   Eye exam and foot exam UTD  Lab Results   Component Value Date    HGBA1C 6.2 2024       Orders:    Hemoglobin A1C; Future    Lipid Panel with Direct LDL reflex; Future    Albumin / creatinine urine ratio; Future    Comprehensive metabolic panel; Future    CBC and differential; Future    Benign essential hypertension  Blood pressure well controlled  Continue current regimen               History of Present Illness     69 year old here for a check up  At his last visit we discussed his A fib. He had a Holter which showed A fib ~3% of the time.  He had one episode that lasted > 1 hour. He still gets symptoms on and off. He is on Flecainide and Cartia.  He sees cardiology, is switching to new cardiologist closer to home.  Next appt in January     Blood pressure is well controlled    DM - on Metformin, fasting glucoses are 110-120.    Due for labs which are ordered    Atrial Fibrillation  Symptoms include palpitations. Symptoms are negative for chest pain and shortness of breath. Past medical history includes atrial fibrillation.     Review of Systems   Constitutional:  Negative for activity change, appetite change, fatigue and unexpected weight change.   Respiratory:  Negative for chest tightness and shortness of breath.    Cardiovascular:  Positive for  "palpitations. Negative for chest pain and leg swelling.   Gastrointestinal:  Negative for abdominal pain.   Neurological:  Negative for headaches.          Objective   Ht 5' 8\" (1.727 m)   Wt 93.5 kg (206 lb 3.2 oz)   BMI 31.35 kg/m²      Physical Exam  Vitals and nursing note reviewed.   Constitutional:       General: He is not in acute distress.     Appearance: He is well-developed. He is not diaphoretic.   HENT:      Head: Normocephalic and atraumatic.      Right Ear: External ear normal.      Left Ear: External ear normal.   Cardiovascular:      Rate and Rhythm: Normal rate and regular rhythm.      Heart sounds: Normal heart sounds. No murmur heard.     No friction rub. No gallop.   Pulmonary:      Effort: Pulmonary effort is normal. No respiratory distress.      Breath sounds: Normal breath sounds. No stridor. No wheezing or rales.   Neurological:      General: No focal deficit present.      Mental Status: He is alert.   Psychiatric:         Mood and Affect: Mood normal.         "

## 2024-11-18 NOTE — ASSESSMENT & PLAN NOTE
In NSR currently  On Cartia, Flecainide and Xarelto  Still with intermittent symptoms.   Has follow-up with cardiology in January --defer further management to Cardio

## 2024-11-18 NOTE — ASSESSMENT & PLAN NOTE
A1c has been well controlled. He plans to do his labs in the near future. Continue Metformin.  Repeat labs in 6 months   Eye exam and foot exam UTD  Lab Results   Component Value Date    HGBA1C 6.2 09/30/2024       Orders:    Hemoglobin A1C; Future    Lipid Panel with Direct LDL reflex; Future    Albumin / creatinine urine ratio; Future    Comprehensive metabolic panel; Future    CBC and differential; Future

## 2024-11-19 ENCOUNTER — APPOINTMENT (OUTPATIENT)
Dept: LAB | Facility: CLINIC | Age: 69
End: 2024-11-19
Payer: COMMERCIAL

## 2024-11-19 DIAGNOSIS — I48.91 ATRIAL FIBRILLATION, UNSPECIFIED TYPE (HCC): ICD-10-CM

## 2024-11-19 DIAGNOSIS — Z12.5 SCREENING PSA (PROSTATE SPECIFIC ANTIGEN): ICD-10-CM

## 2024-11-19 DIAGNOSIS — E11.9 CONTROLLED TYPE 2 DIABETES MELLITUS WITHOUT COMPLICATION, WITHOUT LONG-TERM CURRENT USE OF INSULIN (HCC): ICD-10-CM

## 2024-11-19 LAB
ANION GAP SERPL CALCULATED.3IONS-SCNC: 10 MMOL/L (ref 4–13)
BASOPHILS # BLD AUTO: 0.09 THOUSANDS/ÂΜL (ref 0–0.1)
BASOPHILS NFR BLD AUTO: 2 % (ref 0–1)
BUN SERPL-MCNC: 21 MG/DL (ref 5–25)
CALCIUM SERPL-MCNC: 9.4 MG/DL (ref 8.4–10.2)
CHLORIDE SERPL-SCNC: 105 MMOL/L (ref 96–108)
CHOLEST SERPL-MCNC: 146 MG/DL (ref ?–200)
CO2 SERPL-SCNC: 27 MMOL/L (ref 21–32)
CREAT SERPL-MCNC: 1.19 MG/DL (ref 0.6–1.3)
EOSINOPHIL # BLD AUTO: 0.16 THOUSAND/ÂΜL (ref 0–0.61)
EOSINOPHIL NFR BLD AUTO: 3 % (ref 0–6)
ERYTHROCYTE [DISTWIDTH] IN BLOOD BY AUTOMATED COUNT: 14.3 % (ref 11.6–15.1)
GFR SERPL CREATININE-BSD FRML MDRD: 61 ML/MIN/1.73SQ M
GLUCOSE P FAST SERPL-MCNC: 107 MG/DL (ref 65–99)
HCT VFR BLD AUTO: 41.7 % (ref 36.5–49.3)
HDLC SERPL-MCNC: 44 MG/DL
HGB BLD-MCNC: 12.9 G/DL (ref 12–17)
IMM GRANULOCYTES # BLD AUTO: 0.01 THOUSAND/UL (ref 0–0.2)
IMM GRANULOCYTES NFR BLD AUTO: 0 % (ref 0–2)
LDLC SERPL CALC-MCNC: 82 MG/DL (ref 0–100)
LYMPHOCYTES # BLD AUTO: 1.85 THOUSANDS/ÂΜL (ref 0.6–4.47)
LYMPHOCYTES NFR BLD AUTO: 33 % (ref 14–44)
MCH RBC QN AUTO: 27.4 PG (ref 26.8–34.3)
MCHC RBC AUTO-ENTMCNC: 30.9 G/DL (ref 31.4–37.4)
MCV RBC AUTO: 89 FL (ref 82–98)
MONOCYTES # BLD AUTO: 0.49 THOUSAND/ÂΜL (ref 0.17–1.22)
MONOCYTES NFR BLD AUTO: 9 % (ref 4–12)
NEUTROPHILS # BLD AUTO: 3.05 THOUSANDS/ÂΜL (ref 1.85–7.62)
NEUTS SEG NFR BLD AUTO: 53 % (ref 43–75)
NRBC BLD AUTO-RTO: 0 /100 WBCS
PLATELET # BLD AUTO: 326 THOUSANDS/UL (ref 149–390)
PMV BLD AUTO: 10.7 FL (ref 8.9–12.7)
POTASSIUM SERPL-SCNC: 4.7 MMOL/L (ref 3.5–5.3)
PSA SERPL-MCNC: 0.43 NG/ML (ref 0–4)
RBC # BLD AUTO: 4.7 MILLION/UL (ref 3.88–5.62)
SODIUM SERPL-SCNC: 142 MMOL/L (ref 135–147)
TRIGL SERPL-MCNC: 99 MG/DL (ref ?–150)
TSH SERPL DL<=0.05 MIU/L-ACNC: 1.61 UIU/ML (ref 0.45–4.5)
WBC # BLD AUTO: 5.65 THOUSAND/UL (ref 4.31–10.16)

## 2024-11-19 PROCEDURE — 80048 BASIC METABOLIC PNL TOTAL CA: CPT

## 2024-11-19 PROCEDURE — 36415 COLL VENOUS BLD VENIPUNCTURE: CPT

## 2024-11-19 PROCEDURE — 84443 ASSAY THYROID STIM HORMONE: CPT

## 2024-11-19 PROCEDURE — 85025 COMPLETE CBC W/AUTO DIFF WBC: CPT

## 2024-11-19 PROCEDURE — G0103 PSA SCREENING: HCPCS

## 2024-11-19 PROCEDURE — 80061 LIPID PANEL: CPT

## 2024-11-20 ENCOUNTER — RESULTS FOLLOW-UP (OUTPATIENT)
Dept: FAMILY MEDICINE CLINIC | Facility: CLINIC | Age: 69
End: 2024-11-20

## 2024-12-23 ENCOUNTER — VBI (OUTPATIENT)
Dept: ADMINISTRATIVE | Facility: OTHER | Age: 69
End: 2024-12-23

## 2024-12-24 NOTE — TELEPHONE ENCOUNTER
12/23/24 8:24 PM     Chart reviewed for Hemoglobin A1c was/were submitted to the patient's insurance.     Shante Mann   [facility]

## 2025-01-10 DIAGNOSIS — E11.9 CONTROLLED TYPE 2 DIABETES MELLITUS WITHOUT COMPLICATION, WITHOUT LONG-TERM CURRENT USE OF INSULIN (HCC): ICD-10-CM

## 2025-01-19 DIAGNOSIS — I48.91 ATRIAL FIBRILLATION, UNSPECIFIED TYPE (HCC): ICD-10-CM

## 2025-01-19 DIAGNOSIS — E78.00 HIGH CHOLESTEROL: ICD-10-CM

## 2025-01-20 RX ORDER — FENOFIBRATE 145 MG/1
145 TABLET, COATED ORAL DAILY
Qty: 90 TABLET | Refills: 1 | Status: SHIPPED | OUTPATIENT
Start: 2025-01-20

## 2025-01-29 ENCOUNTER — CONSULT (OUTPATIENT)
Dept: CARDIOLOGY CLINIC | Facility: CLINIC | Age: 70
End: 2025-01-29
Payer: COMMERCIAL

## 2025-01-29 VITALS
HEART RATE: 100 BPM | DIASTOLIC BLOOD PRESSURE: 70 MMHG | WEIGHT: 208 LBS | HEIGHT: 68 IN | SYSTOLIC BLOOD PRESSURE: 135 MMHG | BODY MASS INDEX: 31.52 KG/M2

## 2025-01-29 DIAGNOSIS — I48.91 ATRIAL FIBRILLATION, UNSPECIFIED TYPE (HCC): ICD-10-CM

## 2025-01-29 DIAGNOSIS — E11.9 CONTROLLED TYPE 2 DIABETES MELLITUS WITHOUT COMPLICATION, WITHOUT LONG-TERM CURRENT USE OF INSULIN (HCC): ICD-10-CM

## 2025-01-29 DIAGNOSIS — R07.2 PRECORDIAL PAIN: ICD-10-CM

## 2025-01-29 DIAGNOSIS — G47.33 OBSTRUCTIVE SLEEP APNEA: Primary | ICD-10-CM

## 2025-01-29 PROCEDURE — 99204 OFFICE O/P NEW MOD 45 MIN: CPT | Performed by: INTERNAL MEDICINE

## 2025-01-29 PROCEDURE — 93000 ELECTROCARDIOGRAM COMPLETE: CPT | Performed by: INTERNAL MEDICINE

## 2025-01-29 RX ORDER — DILTIAZEM HYDROCHLORIDE 360 MG/1
360 CAPSULE, EXTENDED RELEASE ORAL DAILY
Qty: 90 CAPSULE | Refills: 5 | Status: SHIPPED | OUTPATIENT
Start: 2025-01-29

## 2025-01-29 NOTE — ASSESSMENT & PLAN NOTE
Intermittent and symptomatic.  Ablation 2017.  Will switch to higher dose diltiazem.  Will d/c flecainide as ineffective.  Will also refer to EP for consideration of repeat ablation.  Finally, sleep apnea being treated but may not be effective.  Will refer to sleep medicine.

## 2025-01-29 NOTE — PROGRESS NOTES
Patient ID: Danny Johnson is a 69 y.o. male.        Plan:      Assessment & Plan  Atrial fibrillation, unspecified type (HCC)  Intermittent and symptomatic.  Ablation 2017.  Will switch to higher dose diltiazem.  Will d/c flecainide as ineffective.  Will also refer to EP for consideration of repeat ablation.  Finally, sleep apnea being treated but may not be effective.  Will refer to sleep medicine.  Obstructive sleep apnea  Still with daytime sleepiness.  Will refer to sleep provider.  Precordial pain  Possibly angina.  Has DM.  Will order a lexiscan myoview.  Controlled type 2 diabetes mellitus without complication, without long-term current use of insulin ([facility])    Lab Results   Component Value Date    HGBA1C 6.2 09/30/2024         Follow up Plan/Other summary comments:  As a placeholder setting him up to see me in 1 year.  Again at this point changes are to increase diltiazem to 360 mg daily, stop flecainide, obtain a Lexiscan Myoview, and refer to EP as well as sleep medicine.    HPI: Patient is seen today in consultation from Dr. Rosenthal.  Principal heart issue has been atrial fibrillation.  In 2017 he had ablation at [facility].  Briefly he felt as if episodes of atrial fibrillation were less frequent but this did not last long.  He again was having episodes of tachycardia consistent with atrial fibrillation.  He was seeing another cardiologist in [place] and was treated essentially with flecainide and CPAP.  However pattern of frequent spells of atrial fibrillation have persisted.  Sometimes when he is in atrial fibrillation he feels chest pressure.  No syncope or near syncope.  Patient was in atrial fibrillation when the EKG was taken prior to me seeing him this morning but by the time I examined him he was in sinus rhythm.    Results for orders placed or performed in visit on 01/29/25   POCT ECG    Impression    Afib with moderate to rapid response. Leftward axis. .         Most recent or relevant  "cardiac/vascular testing:    No recent echo or nuclear study.  Monitor report from 9/30 to 10/5/2024: 3.35% of the time atrial fibrillation with rapid rate.  Otherwise sinus rhythm.      Past Surgical History:   Procedure Laterality Date    CARDIAC ELECTROPHYSIOLOGY MAPPING AND ABLATION      COLONOSCOPY      EGD AND COLONOSCOPY      [place] COLONOSCOPY FLX DX W/COLLJ SPEC WHEN PFRMD N/A 8/29/2018    Procedure: COLONOSCOPY;  Surgeon: Praveen Parrish MD;  Location: [facility];  Service: Gastroenterology    [place] ESOPHAGOGASTRODUODENOSCOPY TRANSORAL DIAGNOSTIC N/A 10/10/2017    Procedure: ESOPHAGOGASTRODUODENOSCOPY (EGD);  Surgeon: Praveen Parrish MD;  Location: [facility];  Service: Gastroenterology    TONSILLECTOMY         Lipid Profile: Reviewed      Review of Systems   10  point ROS  was otherwise non pertinent or negative except as per HPI or as below.   Gait: Normal.        Objective:     /70   Pulse 100   Ht 5' 8\" (1.727 m)   Wt 94.3 kg (208 lb)   BMI 31.63 kg/m²     PHYSICAL EXAM:    General:  Normal appearance in no distress.  Eyes:  Anicteric.  Oral mucosa:  Moist.  Neck:  No JVD. Carotid upstrokes are brisk without bruits.  No masses.  Chest:  Clear to auscultation.  Cardiac:  No palpable PMI.  Normal S1 and S2.  No murmur gallop or rub.  Abdomen:  Soft and nontender. No palpable organomegaly or aortic enlargement.  Extremities:  No peripheral edema.  Musculoskeletal:  Symmetric.   Vascular:  Femoral pulses are brisk without bruits.  Popliteal pulses are intact bilaterally.   Pedal pulses are intact.  Neuro:  Grossly symmetric.  Psych:  Alert and oriented x3.      Meds reviewed.    Past Medical History:   Diagnosis Date    Arthritis     Atrial fibrillation (HCC)     Candidiasis, cutaneous     Colon polyp     Colon, diverticulosis     CPAP (continuous positive airway pressure) dependence     Depression     Diabetes mellitus (HCC)     Diverticulosis     GERD (gastroesophageal reflux disease)     " Hyperlipidemia     Hypertension     Internal hemorrhoids     Irregular heart beat     aFIB    Pneumonia     Shortness of breath     Sleep apnea     uses cpap    Vertigo of central origin            Social History     Tobacco Use   Smoking Status Former    Current packs/day: 0.00    Average packs/day: 1 pack/day for 12.4 years (12.4 ttl pk-yrs)    Types: Cigarettes    Start date: 1962    Quit date: 10/10/1974    Years since quittin.3    Passive exposure: Never   Smokeless Tobacco Never

## 2025-01-29 NOTE — TELEPHONE ENCOUNTER
[facility] CALLED AND SAID THE OMEPRAZOLE 40MG NEEDS PRIOR AUTHORIZATION  PATIENT USED THE QUANTITY LIMIT THAT IS ALLOWED    PRIOR AUTH #    [id number]  Ositochelsea Webb   [phone] Will follow up with Dr. Gildardo Estrada for further orthopedic management outpatient   Rest, ice and elevate.    Please use 650mg tylenol (or acetaminophen) every 6 hours and 600mg motrin (or advil or ibuprofen) every 6 hours as needed for pain/discomfort/swelling.  Make sure not to use more than 3500mg in any 24 hour period.   Do not put weight on it  Worsening pain, swelling, numbness, weakness return to ER

## 2025-02-03 ENCOUNTER — OFFICE VISIT (OUTPATIENT)
Age: 70
End: 2025-02-03
Payer: COMMERCIAL

## 2025-02-03 VITALS
WEIGHT: 211.6 LBS | DIASTOLIC BLOOD PRESSURE: 80 MMHG | SYSTOLIC BLOOD PRESSURE: 144 MMHG | OXYGEN SATURATION: 97 % | BODY MASS INDEX: 32.07 KG/M2 | HEIGHT: 68 IN | HEART RATE: 116 BPM

## 2025-02-03 DIAGNOSIS — G47.33 OSA (OBSTRUCTIVE SLEEP APNEA): ICD-10-CM

## 2025-02-03 DIAGNOSIS — I48.91 ATRIAL FIBRILLATION, UNSPECIFIED TYPE (HCC): Primary | ICD-10-CM

## 2025-02-03 PROCEDURE — 99204 OFFICE O/P NEW MOD 45 MIN: CPT | Performed by: INTERNAL MEDICINE

## 2025-02-03 NOTE — PROGRESS NOTES
Name: Danny Johnson      : 1955      MRN: [id number]  Encounter Provider: Manjinder Lewis MD  Encounter Date: 2/3/2025   Encounter department: [facility]    :  Assessment & Plan  Atrial fibrillation, unspecified type (HCC)  Rhythm irregular  Discussed with patient that his heart rate is 116  He explained that this is frequently the case and his cardiologist is aware  There is tentative plan for another ablation  I explained that optimal treatment of obstructive sleep apnea will decrease recurrence risk of atrial fibrillation       LUKE (obstructive sleep apnea)  Previously diagnosed  Machine is more than 5 years old  Ordered home sleep study for patient's convenience  Follow-up in 2 to 3 months for compliance visit  Recommended he bring in his machine so that data can be seen  Orders:    Home Study; Future      History of Present Illness     70-year-old male with past medical history of atrial fibrillation, hypertension, type 2 diabetes, GERD who presents for evaluation of obstructive sleep apnea.  He was previously diagnosed and has been on CPAP.  His machine is more than 5 years old.    He reports loud snoring, gasping, witnessed apneas.  He goes to bed at 11 PM and falls asleep within 30 minutes.  He gets out of bed at 7 AM.  He wakes up 2-3 times at night for unknown reasons.  He has an [place] score of 5.  He grinds his teeth in his sleep.  He kicks in his sleep.  He feels his memory is worsening.  He occasionally drinks a beer.    He was referred by cardiology because of atrial fibrillation.  He did not bring his CPAP machine and compliance data is not on file.                                                  Review of Systems   All other systems reviewed and are negative.  Pertinent positives/negatives included in HPI and also as noted:       Pertinent Medical History         Medical History Reviewed by provider this encounter:  Meds     .  Past Medical History   Past  Medical History:   Diagnosis Date    Arthritis     Atrial fibrillation (HCC)     Candidiasis, cutaneous     Colon polyp     Colon, diverticulosis     CPAP (continuous positive airway pressure) dependence     Depression     Diabetes mellitus (HCC)     Diverticulosis     GERD (gastroesophageal reflux disease)     Hyperlipidemia     Hypertension     Internal hemorrhoids     Pneumonia     Shortness of breath     Sleep apnea     uses cpap    Vertigo of central origin      Past Surgical History:   Procedure Laterality Date    CARDIAC ELECTROPHYSIOLOGY MAPPING AND ABLATION      COLONOSCOPY      EGD AND COLONOSCOPY      [place] COLONOSCOPY FLX DX W/COLLJ SPEC WHEN PFRMD N/A 8/29/2018    Procedure: COLONOSCOPY;  Surgeon: Praveen Parrish MD;  Location: [facility];  Service: Gastroenterology    [place] ESOPHAGOGASTRODUODENOSCOPY TRANSORAL DIAGNOSTIC N/A 10/10/2017    Procedure: ESOPHAGOGASTRODUODENOSCOPY (EGD);  Surgeon: Praveen Parrish MD;  Location: [facility];  Service: Gastroenterology    TONSILLECTOMY       Family History   Problem Relation Age of Onset    Other Mother         CABG,Hepatic Disorder    Coronary artery disease Mother     Other Father         CABG    Diabetes Father     Cancer Family         Gastrointestinal      reports that he quit smoking about 50 years ago. His smoking use included cigarettes. He started smoking about 62 years ago. He has a 12.4 pack-year smoking history. He has never been exposed to tobacco smoke. He has never used smokeless tobacco. He reports current alcohol use. He reports that he does not use drugs.  Current Outpatient Medications on File Prior to Visit   Medication Sig Dispense Refill    ALPRAZolam (XANAX) 0.25 mg tablet Take 1 tablet (0.25 mg total) by mouth daily as needed for anxiety 30 tablet 0    atorvastatin (LIPITOR) 40 mg tablet Take 1 tablet (40 mg total) by mouth daily 100 tablet 3    diltiazem (CARDIZEM CD) 360 MG 24 hr capsule Take 1 capsule (360 mg total) by mouth daily 90  capsule 5    escitalopram (LEXAPRO) 10 mg tablet take 1 tablet by mouth once daily 90 tablet 1    fenofibrate (TRICOR) 145 mg tablet take 1 tablet by mouth daily 90 tablet 1    glucose blood (OneTouch Ultra) test strip Use 1 each daily Test 100 strip 3    metFORMIN (GLUCOPHAGE) 500 mg tablet take 1 tablet by mouth twice a day with meals 180 tablet 1    omeprazole (PriLOSEC) 40 MG capsule take 1 capsule by mouth once daily 100 capsule 1    tobramycin (TOBREX) 0.3 % OINT Administer 0.5 inches to both eyes 3 (three) times a day 3.5 g 0    Xarelto 20 MG tablet Take 1 tablet (20 mg total) by mouth daily with breakfast 90 tablet 3    azithromycin (ZITHROMAX) 250 mg tablet take 2 tablets by mouth today then take 1 tablet daily for 4 days (Patient not taking: Reported on 2/3/2025)      doxycycline hyclate (VIBRA-TABS) 100 mg tablet  (Patient not taking: Reported on 2/3/2025)      traMADol (ULTRAM) 50 mg tablet Take 1 tablet (50 mg total) by mouth every 6 (six) hours as needed for moderate pain 90 tablet 1     No current facility-administered medications on file prior to visit.   No Known Allergies   Current Outpatient Medications on File Prior to Visit   Medication Sig Dispense Refill    ALPRAZolam (XANAX) 0.25 mg tablet Take 1 tablet (0.25 mg total) by mouth daily as needed for anxiety 30 tablet 0    atorvastatin (LIPITOR) 40 mg tablet Take 1 tablet (40 mg total) by mouth daily 100 tablet 3    diltiazem (CARDIZEM CD) 360 MG 24 hr capsule Take 1 capsule (360 mg total) by mouth daily 90 capsule 5    escitalopram (LEXAPRO) 10 mg tablet take 1 tablet by mouth once daily 90 tablet 1    fenofibrate (TRICOR) 145 mg tablet take 1 tablet by mouth daily 90 tablet 1    glucose blood (OneTouch Ultra) test strip Use 1 each daily Test 100 strip 3    metFORMIN (GLUCOPHAGE) 500 mg tablet take 1 tablet by mouth twice a day with meals 180 tablet 1    omeprazole (PriLOSEC) 40 MG capsule take 1 capsule by mouth once daily 100 capsule 1     "tobramycin (TOBREX) 0.3 % OINT Administer 0.5 inches to both eyes 3 (three) times a day 3.5 g 0    Xarelto 20 MG tablet Take 1 tablet (20 mg total) by mouth daily with breakfast 90 tablet 3    azithromycin (ZITHROMAX) 250 mg tablet take 2 tablets by mouth today then take 1 tablet daily for 4 days (Patient not taking: Reported on 2/3/2025)      doxycycline hyclate (VIBRA-TABS) 100 mg tablet  (Patient not taking: Reported on 2/3/2025)      traMADol (ULTRAM) 50 mg tablet Take 1 tablet (50 mg total) by mouth every 6 (six) hours as needed for moderate pain 90 tablet 1     No current facility-administered medications on file prior to visit.      Social History     Tobacco Use    Smoking status: Former     Current packs/day: 0.00     Average packs/day: 1 pack/day for 12.4 years (12.4 ttl pk-yrs)     Types: Cigarettes     Start date: 1962     Quit date: 10/10/1974     Years since quittin.3     Passive exposure: Never    Smokeless tobacco: Never   Vaping Use    Vaping status: Never Used   Substance and Sexual Activity    Alcohol use: Yes     Comment: RARE    Drug use: No    Sexual activity: Not Currently     Objective   /80 (BP Location: Left arm, Patient Position: Sitting, Cuff Size: Large)   Pulse (!) 116   Ht 5' 8\" (1.727 m)   Wt 96 kg (211 lb 9.6 oz)   SpO2 97%   BMI 32.17 kg/m²     Neck Circumference: 18  Physical Exam  Vitals reviewed.   HENT:      Head: Normocephalic and atraumatic.      Nose: Nose normal.      Mouth/Throat:      Mouth: Mucous membranes are moist.   Eyes:      Extraocular Movements: Extraocular movements intact.      Pupils: Pupils are equal, round, and reactive to light.   Cardiovascular:      Rate and Rhythm: Tachycardia present. Rhythm irregular.   Pulmonary:      Effort: Pulmonary effort is normal.      Breath sounds: Normal breath sounds.   Abdominal:      General: Abdomen is flat. Bowel sounds are normal.      Palpations: Abdomen is soft.   Musculoskeletal:         General: " "Normal range of motion.      Cervical back: Normal range of motion.   Skin:     General: Skin is warm.   Neurological:      Mental Status: He is alert. Mental status is at baseline.   Psychiatric:         Mood and Affect: Mood normal.       Visit Vitals  /80 (BP Location: Left arm, Patient Position: Sitting, Cuff Size: Large)   Pulse (!) 116   Ht 5' 8\" (1.727 m)   Wt 96 kg (211 lb 9.6 oz)   SpO2 97%   BMI 32.17 kg/m²   Smoking Status Former   BSA 2.09 m²           Data  Lab Results   Component Value Date    HGB 12.9 11/19/2024    HCT 41.7 11/19/2024    MCV 89 11/19/2024      Lab Results   Component Value Date    CALCIUM 9.4 11/19/2024     03/27/2018    K 4.7 11/19/2024    CO2 27 11/19/2024     11/19/2024    BUN 21 11/19/2024    CREATININE 1.19 11/19/2024     Lab Results   Component Value Date    IRON 73 07/08/2017    FERRITIN 9 07/08/2017     Lab Results   Component Value Date    AST 25 05/04/2024    ALT 15 05/04/2024             "

## 2025-02-03 NOTE — ASSESSMENT & PLAN NOTE
Rhythm irregular  Discussed with patient that his heart rate is 116  He explained that this is frequently the case and his cardiologist is aware  There is tentative plan for another ablation  I explained that optimal treatment of obstructive sleep apnea will decrease recurrence risk of atrial fibrillation

## 2025-02-04 ENCOUNTER — HOSPITAL ENCOUNTER (OUTPATIENT)
Dept: NON INVASIVE DIAGNOSTICS | Facility: CLINIC | Age: 70
Discharge: HOME | End: 2025-02-04
Payer: COMMERCIAL

## 2025-02-04 ENCOUNTER — TELEPHONE (OUTPATIENT)
Age: 70
End: 2025-02-04

## 2025-02-04 VITALS
DIASTOLIC BLOOD PRESSURE: 86 MMHG | HEIGHT: 68 IN | BODY MASS INDEX: 31.52 KG/M2 | OXYGEN SATURATION: 97 % | HEART RATE: 59 BPM | WEIGHT: 208 LBS | SYSTOLIC BLOOD PRESSURE: 168 MMHG

## 2025-02-04 DIAGNOSIS — R07.2 PRECORDIAL PAIN: ICD-10-CM

## 2025-02-04 LAB
CHEST PAIN STATEMENT: NORMAL
MAX DIASTOLIC BP: 68 MMHG
MAX PREDICTED HEART RATE: 150 BPM
PROTOCOL NAME: NORMAL
RATE PRESSURE PRODUCT: NORMAL
REASON FOR TERMINATION: NORMAL
SL CV REST NUCLEAR ISOTOPE DOSE: 10.39 MCI
SL CV STRESS NUCLEAR ISOTOPE DOSE: 32.7 MCI
SL CV STRESS RECOVERY BP: NORMAL MMHG
SL CV STRESS RECOVERY HR: 76 BPM
SL CV STRESS RECOVERY O2 SAT: 98 %
STRESS ANGINA INDEX: 0
STRESS BASELINE BP: NORMAL MMHG
STRESS BASELINE HR: 59 BPM
STRESS O2 SAT REST: 97 %
STRESS PEAK HR: 96 BPM
STRESS POST EXERCISE DUR MIN: 3 MIN
STRESS POST EXERCISE DUR SEC: 0 SEC
STRESS POST O2 SAT PEAK: 96 %
STRESS POST PEAK BP: 194 MMHG
STRESS POST PEAK HR: 96 BPM
STRESS POST PEAK SYSTOLIC BP: 194 MMHG
TARGET HR FORMULA: NORMAL
TEST INDICATION: NORMAL

## 2025-02-04 PROCEDURE — 93016 CV STRESS TEST SUPVJ ONLY: CPT | Performed by: INTERNAL MEDICINE

## 2025-02-04 PROCEDURE — A9502 TC99M TETROFOSMIN: HCPCS

## 2025-02-04 PROCEDURE — 93017 CV STRESS TEST TRACING ONLY: CPT

## 2025-02-04 PROCEDURE — 93018 CV STRESS TEST I&R ONLY: CPT | Performed by: INTERNAL MEDICINE

## 2025-02-04 PROCEDURE — 78452 HT MUSCLE IMAGE SPECT MULT: CPT

## 2025-02-04 PROCEDURE — 78452 HT MUSCLE IMAGE SPECT MULT: CPT | Performed by: INTERNAL MEDICINE

## 2025-02-04 RX ORDER — REGADENOSON 0.08 MG/ML
0.4 INJECTION, SOLUTION INTRAVENOUS ONCE
Status: COMPLETED | OUTPATIENT
Start: 2025-02-04 | End: 2025-02-04

## 2025-02-04 RX ADMIN — REGADENOSON 0.4 MG: 0.08 INJECTION, SOLUTION INTRAVENOUS at 08:58

## 2025-02-04 NOTE — TELEPHONE ENCOUNTER
Patient called stated he provided Dr Lewis with the wrong mask size.  Correct mask is Resmed airfit F10.

## 2025-02-06 ENCOUNTER — RESULTS FOLLOW-UP (OUTPATIENT)
Dept: CARDIOLOGY CLINIC | Facility: CLINIC | Age: 70
End: 2025-02-06

## 2025-02-07 ENCOUNTER — HOSPITAL ENCOUNTER (OUTPATIENT)
Dept: SLEEP CENTER | Facility: CLINIC | Age: 70
Discharge: HOME | End: 2025-02-07
Payer: COMMERCIAL

## 2025-02-07 DIAGNOSIS — G47.33 OSA (OBSTRUCTIVE SLEEP APNEA): ICD-10-CM

## 2025-02-07 PROCEDURE — G0399 HOME SLEEP TEST/TYPE 3 PORTA: HCPCS

## 2025-02-07 NOTE — PROGRESS NOTES
Home Sleep Study Documentation    HOME STUDY DEVICE: Noxturnal no                                           Sujey G3 yes device # 15      Pre-Sleep Home Study:    Set-up and instructions performed by: Suly    Technician performed demonstration for Patient: yes    Return demonstration performed by Patient: yes    Written instructions provided to Patient: yes    Patient signed consent form: yes        Post-Sleep Home Study:    Additional comments by Patient:       Home Sleep Study Failed:no:    Failure reason: N/A    Reported or Detected: N/A    Scored by: ANTHONY Garcia

## 2025-02-15 DIAGNOSIS — K21.9 GASTROESOPHAGEAL REFLUX DISEASE: ICD-10-CM

## 2025-02-16 DIAGNOSIS — G47.33 OSA (OBSTRUCTIVE SLEEP APNEA): Primary | ICD-10-CM

## 2025-02-16 PROCEDURE — 95806 SLEEP STUDY UNATT&RESP EFFT: CPT | Performed by: INTERNAL MEDICINE

## 2025-02-17 RX ORDER — OMEPRAZOLE 40 MG/1
40 CAPSULE, DELAYED RELEASE ORAL DAILY
Qty: 100 CAPSULE | Refills: 1 | Status: SHIPPED | OUTPATIENT
Start: 2025-02-17

## 2025-02-19 ENCOUNTER — TELEPHONE (OUTPATIENT)
Dept: SLEEP CENTER | Facility: CLINIC | Age: 70
End: 2025-02-19

## 2025-02-19 NOTE — TELEPHONE ENCOUNTER
Home sleep study resulted and shows moderate sleep apnea with event related hypoxemia. TREVON 18.8. CPAP ordered.     Called patient and left message advising I will send a [place] message with the sleep study results and next steps.  Provided sleep center number([phone]) to call if any questions.

## 2025-02-25 ENCOUNTER — TELEPHONE (OUTPATIENT)
Dept: SLEEP CENTER | Facility: CLINIC | Age: 70
End: 2025-02-25

## 2025-02-25 LAB
DME PARACHUTE DELIVERY DATE REQUESTED: NORMAL
DME PARACHUTE ITEM DESCRIPTION: NORMAL
DME PARACHUTE ORDER STATUS: NORMAL
DME PARACHUTE SUPPLIER NAME: NORMAL
DME PARACHUTE SUPPLIER PHONE: NORMAL

## 2025-03-19 LAB

## 2025-03-28 ENCOUNTER — TELEPHONE (OUTPATIENT)
Dept: SLEEP CENTER | Facility: CLINIC | Age: 70
End: 2025-03-28

## 2025-03-28 NOTE — TELEPHONE ENCOUNTER
CMN form for full face mask received via fax from [facility]    Form placed in Dr. Lewis's inbox in [facility] to complete.    Completed form to be faxed to [phone].

## 2025-04-04 ENCOUNTER — TELEPHONE (OUTPATIENT)
Age: 70
End: 2025-04-04

## 2025-04-04 NOTE — TELEPHONE ENCOUNTER
Patient called to stating he received his new cpap machine about 3wks ago and wanted to scheduled a f/u appt. Warm transferred patient to [facility] to further assist.

## 2025-04-09 DIAGNOSIS — F32.A DEPRESSION, UNSPECIFIED DEPRESSION TYPE: ICD-10-CM

## 2025-04-10 RX ORDER — ESCITALOPRAM OXALATE 10 MG/1
10 TABLET ORAL DAILY
Qty: 90 TABLET | Refills: 1 | Status: SHIPPED | OUTPATIENT
Start: 2025-04-10

## 2025-04-15 ENCOUNTER — CONSULT (OUTPATIENT)
Dept: CARDIOLOGY CLINIC | Facility: CLINIC | Age: 70
End: 2025-04-15
Payer: COMMERCIAL

## 2025-04-15 ENCOUNTER — TELEPHONE (OUTPATIENT)
Dept: CARDIOLOGY CLINIC | Facility: CLINIC | Age: 70
End: 2025-04-15

## 2025-04-15 ENCOUNTER — PREP FOR PROCEDURE (OUTPATIENT)
Dept: CARDIOLOGY CLINIC | Facility: CLINIC | Age: 70
End: 2025-04-15

## 2025-04-15 VITALS
SYSTOLIC BLOOD PRESSURE: 136 MMHG | BODY MASS INDEX: 31.84 KG/M2 | WEIGHT: 210.1 LBS | HEIGHT: 68 IN | HEART RATE: 61 BPM | DIASTOLIC BLOOD PRESSURE: 58 MMHG

## 2025-04-15 DIAGNOSIS — I10 BENIGN ESSENTIAL HYPERTENSION: ICD-10-CM

## 2025-04-15 DIAGNOSIS — I48.0 PAROXYSMAL ATRIAL FIBRILLATION (HCC): Primary | ICD-10-CM

## 2025-04-15 DIAGNOSIS — I48.91 ATRIAL FIBRILLATION, UNSPECIFIED TYPE (HCC): ICD-10-CM

## 2025-04-15 DIAGNOSIS — G47.33 OSA (OBSTRUCTIVE SLEEP APNEA): ICD-10-CM

## 2025-04-15 DIAGNOSIS — E78.2 MIXED HYPERLIPIDEMIA: ICD-10-CM

## 2025-04-15 LAB
ATRIAL RATE: 61 BPM
P AXIS: 61 DEGREES
PR INTERVAL: 194 MS
QRS AXIS: -34 DEGREES
QRSD INTERVAL: 110 MS
QT INTERVAL: 434 MS
QTC INTERVAL: 438 MS
T WAVE AXIS: 80 DEGREES
VENTRICULAR RATE: 61 BPM

## 2025-04-15 PROCEDURE — 93000 ELECTROCARDIOGRAM COMPLETE: CPT | Performed by: STUDENT IN AN ORGANIZED HEALTH CARE EDUCATION/TRAINING PROGRAM

## 2025-04-15 PROCEDURE — 99214 OFFICE O/P EST MOD 30 MIN: CPT | Performed by: STUDENT IN AN ORGANIZED HEALTH CARE EDUCATION/TRAINING PROGRAM

## 2025-04-15 RX ORDER — FLECAINIDE ACETATE 100 MG/1
1 TABLET ORAL 2 TIMES DAILY
COMMUNITY
Start: 2025-03-05 | End: 2025-04-15 | Stop reason: SDUPTHER

## 2025-04-15 RX ORDER — CIPROFLOXACIN 500 MG/1
TABLET, FILM COATED ORAL
COMMUNITY
Start: 2025-03-20

## 2025-04-15 RX ORDER — FLECAINIDE ACETATE 100 MG/1
100 TABLET ORAL 2 TIMES DAILY
Qty: 180 TABLET | Refills: 3 | Status: SHIPPED | OUTPATIENT
Start: 2025-04-15

## 2025-04-15 NOTE — PROGRESS NOTES
HEART AND VASCULAR  CARDIAC ELECTROPHYSIOLOGY   HEART RHYTHM CENTER  [facility]    Outpatient New Consult for assessment and management of paroxysmal atrial fibrillation  Today's Date: 04/15/25        Patient name: Danny Johnson  YOB: 1955  Sex: male         Chief Complaint: As above      ASSESSMENT:  Problem List Items Addressed This Visit       Benign essential hypertension    Atrial fibrillation (HCC) - Primary    Relevant Medications    flecainide (TAMBOCOR) 100 mg tablet    Other Relevant Orders    POCT ECG    Mixed hyperlipidemia    LUKE (obstructive sleep apnea)             PLAN:  Paroxysmal atrial fibrillation  - Status post ablation in 2017  - On flecainide 100mg q12H  - Diltiazem 360 mg daily  - Xarelto 20 mg daily  - Plan for EPS and ablation    Tooth infection  - Has been on multiple antibiotics  - Discussing possibility of alternative antiarrhythmics  - Have asked the patient to address his tooth infection prior to presenting for ablation.    LUKE  - On CPAP    Hypertension  - Blood pressure today's visit 136/58  - Continue home medications     Hyperlipidemia  -continue atorvastatin    Class I obesity BMI 31.6  -advised weight loss for risk modification      Follow up in: 6 months    Orders Placed This Encounter   Procedures    POCT ECG     Medications Discontinued During This Encounter   Medication Reason    flecainide (TAMBOCOR) 100 mg tablet Reorder         .............................................................................................    HPI/Subjective:     Mr. Danny Johnson is a 70-year-old gentleman with past medical history of atrial fibrillation status post prior ablation 2017, LUKE on CPAP, hypertension, and hyperlipidemia.  He was maintained on flecainide, but due to breakthrough atrial fibrillation, this was discontinued.  His diltiazem was switched to 360 mg for better rate control.  He was seen in outpatient cardiology clinic on January  29, 2025 at which time he noted that he was having intermittent and symptomatic episodes of atrial fibrillation.  He also noted daytime sleepiness and there were concerns that his sleep apnea may be not treated effectively.  He was referred to sleep medicine, and per EMR appears to receive the full facemask.  He also noted precordial pain for which a Lexiscan Myoview was ordered.  This test was negative for ischemia.    Today he presents to discuss his atrial fibrillation. He is in normal sinus rhythm.  He notes that he continues to have episodes of palpitations and atrial fibrillation.  He states the use appropriate correlated to food intake.  His blood sugar will rise and he will take an additional metformin up to 4 pills a day which will reduce his blood sugar and lead to reduction resolution of his atrial fibrillation.  He was instructed that blood sugar is not likely playing a role in his atrial fibrillation and advised to take his metformin as prescribed.    Based on EMR, his flecainide was stopped.  However patient notes when it was stopped, he had more recurrences of atrial fibrillation.  Therefore he restarted flecainide 100 mg twice daily on his own.  He continues to take diltiazem 360 mg daily.    We discussed options for management of atrial fibrillation including alternative antiarrhythmics such as dofetilide and sotalol, amiodarone, as well as EP study and ablation.  We discussed reconnection in the pulmonary veins and the fact that we could target the posterior wall if we repeat his ablation.    We also discussed that the patient has a tooth infection that he has been treating with multiple antibiotics.  The infection continues to worsen.  He is likely going to have to have extraction.  I discussed that dofetilide and sotalol are agents that can interact with antibiotics and worsen her safety profile.  Given this, I have asked that he address his tooth infection prior to any procedural intervention on  my part.    After discussion, patient noted he would like to repeat an ablation.  Will be sent to scheduling to arrange.    Lastly we discussed modifiable risk factors.  We focused on weight.  We discussed that losing 10% of his body weight could dramatically reduce his occurrence of atrial fibrillation.  He has sleep apnea wearing a CPAP.        Complete 12 point ROS reviewed and otherwise non pertinent or negative except as per HPI pertinent positives in Cardiovascular and Respiratory emphasized. Please see paper chart for outpatient clinic patients where the patient completed the 12 point ROS survey.           Past Medical History:   Diagnosis Date    Arthritis     Atrial fibrillation (HCC)     Candidiasis, cutaneous     Colon polyp     Colon, diverticulosis     CPAP (continuous positive airway pressure) dependence     Depression     Diabetes mellitus (HCC)     Diverticulosis     GERD (gastroesophageal reflux disease)     Hyperlipidemia     Hypertension     Internal hemorrhoids     Pneumonia     Shortness of breath     Sleep apnea     uses cpap    Vertigo of central origin        No Known Allergies  I reviewed the Home Medication list and Allergies in the chart.   Scheduled Meds:  Current Outpatient Medications   Medication Sig Dispense Refill    ALPRAZolam (XANAX) 0.25 mg tablet Take 1 tablet (0.25 mg total) by mouth daily as needed for anxiety 30 tablet 0    atorvastatin (LIPITOR) 40 mg tablet Take 1 tablet (40 mg total) by mouth daily 100 tablet 3    ciprofloxacin (CIPRO) 500 mg tablet take 1 tablet by mouth twice a day until finished      diltiazem (CARDIZEM CD) 360 MG 24 hr capsule Take 1 capsule (360 mg total) by mouth daily 90 capsule 5    escitalopram (LEXAPRO) 10 mg tablet take 1 tablet by mouth once daily 90 tablet 1    fenofibrate (TRICOR) 145 mg tablet take 1 tablet by mouth daily 90 tablet 1    flecainide (TAMBOCOR) 100 mg tablet Take 1 tablet (100 mg total) by mouth 2 (two) times a day 180 tablet 3     glucose blood (OneTouch Ultra) test strip Use 1 each daily Test 100 strip 3    metFORMIN (GLUCOPHAGE) 500 mg tablet take 1 tablet by mouth twice a day with meals 180 tablet 1    omeprazole (PriLOSEC) 40 MG capsule take 1 capsule by mouth once daily 100 capsule 1    tobramycin (TOBREX) 0.3 % OINT Administer 0.5 inches to both eyes 3 (three) times a day 3.5 g 0    Xarelto 20 MG tablet Take 1 tablet (20 mg total) by mouth daily with breakfast 90 tablet 3    azithromycin (ZITHROMAX) 250 mg tablet take 2 tablets by mouth today then take 1 tablet daily for 4 days (Patient not taking: Reported on 2025)      doxycycline hyclate (VIBRA-TABS) 100 mg tablet  (Patient not taking: Reported on 2025)      traMADol (ULTRAM) 50 mg tablet Take 1 tablet (50 mg total) by mouth every 6 (six) hours as needed for moderate pain 90 tablet 1     No current facility-administered medications for this visit.     PRN Meds:.        Family History   Problem Relation Age of Onset    Other Mother         CABG,Hepatic Disorder    Coronary artery disease Mother     Other Father         CABG    Diabetes Father     Cancer Family         Gastrointestinal       Social History     Socioeconomic History    Marital status: /Civil Union     Spouse name: Not on file    Number of children: Not on file    Years of education: Not on file    Highest education level: Not on file   Occupational History    Not on file   Tobacco Use    Smoking status: Former     Current packs/day: 0.00     Average packs/day: 1 pack/day for 12.4 years (12.4 ttl pk-yrs)     Types: Cigarettes     Start date: 1962     Quit date: 10/10/1974     Years since quittin.5     Passive exposure: Never    Smokeless tobacco: Never   Vaping Use    Vaping status: Never Used   Substance and Sexual Activity    Alcohol use: Yes     Comment: RARE    Drug use: No    Sexual activity: Not Currently   Other Topics Concern    Not on file   Social History Narrative    Under stress  "    Social Drivers of Health     Financial Resource Strain: Low Risk  (5/9/2023)    Overall Financial Resource Strain (CARDIA)     Difficulty of Paying Living Expenses: Not very hard   Food Insecurity: No Food Insecurity (5/13/2024)    Nursing - Inadequate Food Risk Classification     Worried About Running Out of Food in the Last Year: Never true     Ran Out of Food in the Last Year: Never true     Ran Out of Food in the Last Year: Not on file   Transportation Needs: No Transportation Needs (5/13/2024)    PRAPARE - Transportation     Lack of Transportation (Medical): No     Lack of Transportation (Non-Medical): No   Physical Activity: Insufficiently Active (8/24/2020)    Exercise Vital Sign     Days of Exercise per Week: 1 day     Minutes of Exercise per Session: 20 min   Stress: Stress Concern Present (8/24/2020)    [employer] [place] [employer] [employer] - Occupational Stress Questionnaire     Feeling of Stress : To some extent   Social Connections: Not on file   Intimate Partner Violence: Not on file   Housing Stability: Low Risk  (5/13/2024)    Housing Stability Vital Sign     Unable to Pay for Housing in the Last Year: No     Number of Times Moved in the Last Year: 1     Homeless in the Last Year: No         OBJECTIVE:    /58 (BP Location: Left arm, Patient Position: Sitting, Cuff Size: Large)   Pulse 61   Ht 5' 8\" (1.727 m)   Wt 95.3 kg (210 lb 1.6 oz)   BMI 31.95 kg/m²   Vitals:    04/15/25 1313   Weight: 95.3 kg (210 lb 1.6 oz)     GEN: No acute distress, Alert and oriented, well appearing  HEENT:External ears normal, oral pharynx clear, mucous membranes moist  EYES: Pupils equal, sclera anicteric, midline, normal conjuctiva  NECK: No JVD, supple, no obvious masses or thryomegaly or goiter  CARDIOVASCULAR:  RRR, 2/6 LEXY loudest at the right upper sternal border, rub, gallops S1,S2  LUNGS: Clear To auscultation bilaterally, normal effort, no rales, rhonchi, crackles   ABDOMEN:  nondistended,  " "without obvious organomegaly or ascites  EXTREMITIES/VASCULAR:  No edema. warm an well perfused.  PSYCH: Normal Affect,  linear speech pattern without evidence of psychosis.   NEURO: Grossly intact, moving all extremiteis equal, face symmetric, alert and responsive, no obvious focal defecits   GAIT:  Ambulates normally without difficulty  HEME: No bleeding, bruising, petechia, purpura   SKIN: No significant rashes on visibile skin, warm, no diaphoresis or pallor.     Lab Results:       LABS:      Chemistry        Component Value Date/Time     03/27/2018 0823    K 4.7 11/19/2024 0751    K 4.2 03/27/2018 0823     11/19/2024 0751     03/27/2018 0823    CO2 27 11/19/2024 0751    CO2 27 03/27/2018 0823    BUN 21 11/19/2024 0751    BUN 20 03/27/2018 0823    CREATININE 1.19 11/19/2024 0751    CREATININE 1.10 03/27/2018 0823        Component Value Date/Time    CALCIUM 9.4 11/19/2024 0751    CALCIUM 9.0 03/27/2018 0823    ALKPHOS 31 (L) 05/04/2024 0752    AST 25 05/04/2024 0752    ALT 15 05/04/2024 0752            Lab Results   Component Value Date    CHOL 153 03/27/2018     Lab Results   Component Value Date    HDL 44 11/19/2024    HDL 41 05/04/2024    HDL 40 10/25/2023     Lab Results   Component Value Date    LDLCALC 82 11/19/2024    LDLCALC 71 05/04/2024    LDLCALC 67 10/25/2023     Lab Results   Component Value Date    TRIG 99 11/19/2024    TRIG 93 05/04/2024    TRIG 94 10/25/2023     No results found for: \"CHOLHDL\"    IMAGING: No results found.     Cardiac testing:       I reviewed and interpreted the following LABS/EKG/TELE/IMAGING and below is summary of my interpretation (if data available):    LABS:    Current EKG reveals NSR, possible left atrial enlargement , left axis deviation    Nuclear stress test performed on 3/4/2025    Stress ECG: The ECG was not diagnostic due to pharmacological (vasodilator) stress.    Stress Function: Left ventricular function post-stress is normal.    Stress Combined " Conclusion: The ECG and SPECT imaging portions of the stress study are concordant with no evidence of stress induced myocardial ischemia.    Perfusion: There is a left ventricular perfusion defect present in the inferior wall that is paradoxical, likely representing diaphragmatic attenuation artifact.

## 2025-04-15 NOTE — TELEPHONE ENCOUNTER
Patient scheduled for YONATAN / AFIB  Ablation / PFA on 6/4/25  at [facility] with Dr. IBARRA.      Patient aware of all general instructions.    Medication holds:     Metformin - Take your regular dose day/evening before procedure and do not take morning of procedure.      Xarelto - Do not take the morning of procedure.     Labs to be done BETWEEN 5/4/25 & 5/28/25   CMP / CBC (FASTING 8 HOURS)      Thank you,  Nargis Pittman

## 2025-04-15 NOTE — LETTER
4/15/2025               CARDIAC ABLATION INSTRUCTIONS     Danny Johnson   : 1955  MRN: [id number]  [address]  [place] [address]    Procedure: YONATAN / AFIB ABLATION / PFA     Procedure Date: 25     Location: [facility]  Address: [address] [place] [address], [place] [address]        Labs to be done BETWEEN 25 & 25 : CMP /  CBC (FASTING 8 HOURS)    BLOOD THINNER INSTRUCTIONS (Coumadin / Warfarin / Pradaxa / Eliquis / Xarelto):     Xarelto - Do not take the morning of procedure.       Medication Hold:     Metformin - Take your regular dose day/evening before procedure and do not take morning of procedure.           Arrival Time: The Hospital will contact you the day prior to your procedure, usually between 4PM - 6PM to instruct you on the time and place to report. If you do not hear from a [facility]  by 6PM the evening prior to your procedure, please contact the campus you are scheduled at.     DO NOT drink or eat ANYTHING after midnight the night prior to your procedure. You may have a minimal amount of water with your AM medications.     Arrange for a responsible adult to drive you to and from the hospital.    You should continue to take your morning medications with a sip of water UNLESS ADVISED OTHERWISE.       DO NOT stop taking Plavix or Aspirin unless advised otherwise.     If you are currently taking Fish Oil, Krill oil and/or Vitamin E please DO NOT TAKE FOR A WEEK PRIOR TO PROCEDURE.     If you are diabetic, DO NOT take any of your diabetic pills the morning of your procedure. Oral diabetic medications may include Glucophage, Prandin, Glyburide, Micronase, Avandia, Glucovance, Precose, Glynase, and Starlix.     Bring a list of daily medications, vitamins, minerals, herbals and nutritional supplements you take. Please include dosages and the times you take them each day.     If you are packing an overnight bag, pack minimal clothing, you will be  given hospital sleepwear.   DO NOT bring money, valuables or jewelry. The wedding band is ok.     If you use CPAP machine, bring it to the hospital.      Bring your Photo ID and Insurance cards with you.     Please notify us if you have been admitted to the hospital within 30 days.      FAILURE TO FOLLOW ANY OF THESE INSTRUCTIONS COULD RESULT IN THE CANCELLATION OF YOUR PROCEDURE      Please call [phone] if you do not hear from the  by 6:00PM the night before your procedure.    All patients enter through ENTRANCE B. [employer] Parking is available free of charge or park on Parking Deck B.        Thank you,   Nargis Pittman  Surgery Coordinator  [facility]   [address] [place] [id number]  Teams: [phone]

## 2025-05-07 ENCOUNTER — RESULTS FOLLOW-UP (OUTPATIENT)
Dept: FAMILY MEDICINE CLINIC | Facility: CLINIC | Age: 70
End: 2025-05-07

## 2025-05-07 ENCOUNTER — APPOINTMENT (OUTPATIENT)
Dept: LAB | Facility: CLINIC | Age: 70
End: 2025-05-07
Payer: COMMERCIAL

## 2025-05-07 DIAGNOSIS — E11.9 CONTROLLED TYPE 2 DIABETES MELLITUS WITHOUT COMPLICATION, WITHOUT LONG-TERM CURRENT USE OF INSULIN (HCC): ICD-10-CM

## 2025-05-07 DIAGNOSIS — I48.0 PAROXYSMAL ATRIAL FIBRILLATION (HCC): ICD-10-CM

## 2025-05-07 DIAGNOSIS — I10 BENIGN ESSENTIAL HYPERTENSION: ICD-10-CM

## 2025-05-07 DIAGNOSIS — I48.91 ATRIAL FIBRILLATION, UNSPECIFIED TYPE (HCC): ICD-10-CM

## 2025-05-07 LAB
ALBUMIN SERPL BCG-MCNC: 4 G/DL (ref 3.5–5)
ALP SERPL-CCNC: 25 U/L (ref 34–104)
ALT SERPL W P-5'-P-CCNC: 15 U/L (ref 7–52)
ANION GAP SERPL CALCULATED.3IONS-SCNC: 8 MMOL/L (ref 4–13)
AST SERPL W P-5'-P-CCNC: 21 U/L (ref 13–39)
BASOPHILS # BLD AUTO: 0.08 THOUSANDS/ÂΜL (ref 0–0.1)
BASOPHILS NFR BLD AUTO: 1 % (ref 0–1)
BILIRUB SERPL-MCNC: 0.55 MG/DL (ref 0.2–1)
BUN SERPL-MCNC: 19 MG/DL (ref 5–25)
CALCIUM SERPL-MCNC: 9.1 MG/DL (ref 8.4–10.2)
CHLORIDE SERPL-SCNC: 107 MMOL/L (ref 96–108)
CHOLEST SERPL-MCNC: 145 MG/DL (ref ?–200)
CO2 SERPL-SCNC: 27 MMOL/L (ref 21–32)
CREAT SERPL-MCNC: 1.15 MG/DL (ref 0.6–1.3)
CREAT UR-MCNC: 182.9 MG/DL
EOSINOPHIL # BLD AUTO: 0.17 THOUSAND/ÂΜL (ref 0–0.61)
EOSINOPHIL NFR BLD AUTO: 3 % (ref 0–6)
ERYTHROCYTE [DISTWIDTH] IN BLOOD BY AUTOMATED COUNT: 15.7 % (ref 11.6–15.1)
EST. AVERAGE GLUCOSE BLD GHB EST-MCNC: 140 MG/DL
GFR SERPL CREATININE-BSD FRML MDRD: 64 ML/MIN/1.73SQ M
GLUCOSE P FAST SERPL-MCNC: 98 MG/DL (ref 65–99)
HBA1C MFR BLD: 6.5 %
HCT VFR BLD AUTO: 39.6 % (ref 36.5–49.3)
HDLC SERPL-MCNC: 44 MG/DL
HGB BLD-MCNC: 12.5 G/DL (ref 12–17)
IMM GRANULOCYTES # BLD AUTO: 0.02 THOUSAND/UL (ref 0–0.2)
IMM GRANULOCYTES NFR BLD AUTO: 0 % (ref 0–2)
LDLC SERPL CALC-MCNC: 84 MG/DL (ref 0–100)
LYMPHOCYTES # BLD AUTO: 1.79 THOUSANDS/ÂΜL (ref 0.6–4.47)
LYMPHOCYTES NFR BLD AUTO: 31 % (ref 14–44)
MCH RBC QN AUTO: 26.9 PG (ref 26.8–34.3)
MCHC RBC AUTO-ENTMCNC: 31.6 G/DL (ref 31.4–37.4)
MCV RBC AUTO: 85 FL (ref 82–98)
MICROALBUMIN UR-MCNC: 11.3 MG/L
MICROALBUMIN/CREAT 24H UR: 6 MG/G CREATININE (ref 0–30)
MONOCYTES # BLD AUTO: 0.52 THOUSAND/ÂΜL (ref 0.17–1.22)
MONOCYTES NFR BLD AUTO: 9 % (ref 4–12)
NEUTROPHILS # BLD AUTO: 3.12 THOUSANDS/ÂΜL (ref 1.85–7.62)
NEUTS SEG NFR BLD AUTO: 56 % (ref 43–75)
NRBC BLD AUTO-RTO: 0 /100 WBCS
PLATELET # BLD AUTO: 328 THOUSANDS/UL (ref 149–390)
PMV BLD AUTO: 10.7 FL (ref 8.9–12.7)
POTASSIUM SERPL-SCNC: 4.1 MMOL/L (ref 3.5–5.3)
PROT SERPL-MCNC: 6.4 G/DL (ref 6.4–8.4)
RBC # BLD AUTO: 4.64 MILLION/UL (ref 3.88–5.62)
SODIUM SERPL-SCNC: 142 MMOL/L (ref 135–147)
TRIGL SERPL-MCNC: 86 MG/DL (ref ?–150)
WBC # BLD AUTO: 5.7 THOUSAND/UL (ref 4.31–10.16)

## 2025-05-07 PROCEDURE — 82570 ASSAY OF URINE CREATININE: CPT

## 2025-05-07 PROCEDURE — 80061 LIPID PANEL: CPT

## 2025-05-07 PROCEDURE — 83036 HEMOGLOBIN GLYCOSYLATED A1C: CPT

## 2025-05-07 PROCEDURE — 85025 COMPLETE CBC W/AUTO DIFF WBC: CPT

## 2025-05-07 PROCEDURE — 80053 COMPREHEN METABOLIC PANEL: CPT

## 2025-05-07 PROCEDURE — 82043 UR ALBUMIN QUANTITATIVE: CPT

## 2025-05-07 PROCEDURE — 36415 COLL VENOUS BLD VENIPUNCTURE: CPT

## 2025-05-09 ENCOUNTER — RA CDI HCC (OUTPATIENT)
Dept: OTHER | Facility: HOSPITAL | Age: 70
End: 2025-05-09

## 2025-05-14 ENCOUNTER — OFFICE VISIT (OUTPATIENT)
Age: 70
End: 2025-05-14
Payer: COMMERCIAL

## 2025-05-14 VITALS
SYSTOLIC BLOOD PRESSURE: 140 MMHG | BODY MASS INDEX: 31.46 KG/M2 | HEIGHT: 68 IN | WEIGHT: 207.6 LBS | DIASTOLIC BLOOD PRESSURE: 82 MMHG

## 2025-05-14 DIAGNOSIS — G47.33 OSA (OBSTRUCTIVE SLEEP APNEA): Primary | ICD-10-CM

## 2025-05-14 DIAGNOSIS — E66.811 CLASS 1 OBESITY DUE TO EXCESS CALORIES WITHOUT SERIOUS COMORBIDITY WITH BODY MASS INDEX (BMI) OF 31.0 TO 31.9 IN ADULT: ICD-10-CM

## 2025-05-14 DIAGNOSIS — E66.09 CLASS 1 OBESITY DUE TO EXCESS CALORIES WITHOUT SERIOUS COMORBIDITY WITH BODY MASS INDEX (BMI) OF 31.0 TO 31.9 IN ADULT: ICD-10-CM

## 2025-05-14 DIAGNOSIS — I48.91 ATRIAL FIBRILLATION, UNSPECIFIED TYPE (HCC): ICD-10-CM

## 2025-05-14 PROCEDURE — G2211 COMPLEX E/M VISIT ADD ON: HCPCS | Performed by: INTERNAL MEDICINE

## 2025-05-14 PROCEDURE — 99214 OFFICE O/P EST MOD 30 MIN: CPT | Performed by: INTERNAL MEDICINE

## 2025-05-14 NOTE — PROGRESS NOTES
Name: Danny Johnson      : 1955      MRN: [id number]  Encounter Provider: Manjinder Lewis MD  Encounter Date: 2025   Encounter department: [facility] SLEEP MEDICINE [facility]    :  Assessment & Plan  LUKE (obstructive sleep apnea)  Previously diagnosed  At initial office visit his machine was more than 5 years old  Repeat home study showed moderate obstructive sleep apnea with an TREVON of 18.8 with an oxygen iva of 83%    Ordered APAP 5-20  Compliance from  -   More than 4 hours 97%, 6 hours and 38 minutes  95 percentile pressure 10.9  Median leaks 0.9  Residual AHI 4.8    Excellent compliance with improvement in all symptoms    Plan  Follow-up in 1 year       Class 1 obesity due to excess calories without serious comorbidity with body mass index (BMI) of 31.0 to 31.9 in adult  Counseled patient on lifestyle modifications including diet and exercise  Explained that weight loss will decrease the severity of sleep apnea         Atrial fibrillation, unspecified type (HCC)  Scheduled for an ablation on   Rhythm is currently irregular and in A-fib  Previous ablation in   Currently on flecainide, Cardizem and Xarelto  Explained that there is decrease recurrence risk with treatment of sleep apnea         History of Present Illness     70-year-old male with past medical history of atrial fibrillation, hypertension, type 2 diabetes, GERD who presents for follow-up of obstructive sleep apnea.     Initial office visit   He was previously diagnosed and has been on CPAP.  His machine is more than 5 years old.    He reports loud snoring, gasping, witnessed apneas.  He goes to bed at 11 PM and falls asleep within 30 minutes.  He gets out of bed at 7 AM.  He wakes up 2-3 times at night for unknown reasons.  He has an [place] score of 5.  He grinds his teeth in his sleep.  He kicks in his sleep.  He feels his memory is worsening.  He occasionally drinks a beer.    He was referred by cardiology because  "of atrial fibrillation.  He did not bring his CPAP machine and compliance data is not on file.      Current office visit  He has excellent compliance with CPAP.  He notes improvement in all symptoms.  He has an [place] score of 4.  He notes improvement in snoring, sleep quality, and general mood.  He occasionally has dry mouth.  He goes to bed at 11 PM and falls asleep within 30 minutes.  He gets out of bed at 7 AM.  He gets 6 hours of sleep on average.  He feels refreshed when he wakes up.  He drinks coffee in the morning.                      Sitting and reading: (Patient-Rptd) (P) Slight chance of dozing  Watching TV: (Patient-Rptd) (P) Slight chance of dozing  Sitting, inactive in a public place (e.g. a theatre or a meeting): (Patient-Rptd) (P) Would never doze  As a passenger in a car for an hour without a break: (Patient-Rptd) (P) Would never doze  Lying down to rest in the afternoon when circumstances permit: (Patient-Rptd) (P) Slight chance of dozing  Sitting and talking to someone: (Patient-Rptd) (P) Would never doze  Sitting quietly after a lunch without alcohol: (Patient-Rptd) (P) Slight chance of dozing  In a car, while stopped for a few minutes in traffic: (Patient-Rptd) (P) Would never doze  Total score: (Patient-Rptd) (P) 4       Review of Systems   Constitutional: Negative.    HENT: Negative.     Eyes: Negative.    Respiratory: Negative.     Cardiovascular: Negative.    Gastrointestinal: Negative.    Endocrine: Negative.    Genitourinary: Negative.    Musculoskeletal: Negative.    Skin: Negative.    Allergic/Immunologic: Negative.    Neurological: Negative.    Hematological: Negative.    Psychiatric/Behavioral: Negative.       Pertinent positives/negatives included in HPI and also as noted:         Objective   /82 (BP Location: Right arm, Patient Position: Sitting)   Ht 5' 8\" (1.727 m)   Wt 94.2 kg (207 lb 9.6 oz)   BMI 31.57 kg/m²        Physical Exam  Vitals reviewed.   HENT:      Head: " "Normocephalic and atraumatic.      Nose: Nose normal.      Mouth/Throat:      Mouth: Mucous membranes are moist.     Eyes:      Extraocular Movements: Extraocular movements intact.      Pupils: Pupils are equal, round, and reactive to light.       Cardiovascular:      Rate and Rhythm: Normal rate and regular rhythm.      Pulses: Normal pulses.   Pulmonary:      Effort: Pulmonary effort is normal.      Breath sounds: Normal breath sounds.   Abdominal:      General: Abdomen is flat. Bowel sounds are normal.      Palpations: Abdomen is soft.     Musculoskeletal:         General: Normal range of motion.      Cervical back: Normal range of motion.     Skin:     General: Skin is warm.     Neurological:      Mental Status: He is alert. Mental status is at baseline.     Psychiatric:         Mood and Affect: Mood normal.       Visit Vitals  /82 (BP Location: Right arm, Patient Position: Sitting)   Ht 5' 8\" (1.727 m)   Wt 94.2 kg (207 lb 9.6 oz)   BMI 31.57 kg/m²   Smoking Status Former   BSA 2.08 m²                                               Data  Lab Results   Component Value Date    HGB 12.5 05/07/2025    HCT 39.6 05/07/2025    MCV 85 05/07/2025      Lab Results   Component Value Date    CALCIUM 9.1 05/07/2025     03/27/2018    K 4.1 05/07/2025    CO2 27 05/07/2025     05/07/2025    BUN 19 05/07/2025    CREATININE 1.15 05/07/2025     Lab Results   Component Value Date    IRON 73 07/08/2017    FERRITIN 9 07/08/2017     Lab Results   Component Value Date    AST 21 05/07/2025    ALT 15 05/07/2025             "

## 2025-05-14 NOTE — ASSESSMENT & PLAN NOTE
Previously diagnosed  At initial office visit his machine was more than 5 years old  Repeat home study showed moderate obstructive sleep apnea with an TREVON of 18.8 with an oxygen iva of 83%    Ordered APAP 5-20  Compliance from 4/7 - 5/6  More than 4 hours 97%, 6 hours and 38 minutes  95 percentile pressure 10.9  Median leaks 0.9  Residual AHI 4.8    Excellent compliance with improvement in all symptoms    Plan  Follow-up in 1 year

## 2025-05-14 NOTE — ASSESSMENT & PLAN NOTE
Scheduled for an ablation on June 4  Rhythm is currently irregular and in A-fib  Previous ablation in 2017  Currently on flecainide, Cardizem and Xarelto  Explained that there is decrease recurrence risk with treatment of sleep apnea

## 2025-05-16 ENCOUNTER — OFFICE VISIT (OUTPATIENT)
Dept: FAMILY MEDICINE CLINIC | Facility: CLINIC | Age: 70
End: 2025-05-16
Payer: COMMERCIAL

## 2025-05-16 VITALS
HEART RATE: 67 BPM | OXYGEN SATURATION: 98 % | SYSTOLIC BLOOD PRESSURE: 136 MMHG | HEIGHT: 68 IN | TEMPERATURE: 97.6 F | WEIGHT: 206.8 LBS | DIASTOLIC BLOOD PRESSURE: 60 MMHG | BODY MASS INDEX: 31.34 KG/M2

## 2025-05-16 DIAGNOSIS — E11.9 CONTROLLED TYPE 2 DIABETES MELLITUS WITHOUT COMPLICATION, WITHOUT LONG-TERM CURRENT USE OF INSULIN (HCC): ICD-10-CM

## 2025-05-16 DIAGNOSIS — Z00.00 MEDICARE ANNUAL WELLNESS VISIT, SUBSEQUENT: ICD-10-CM

## 2025-05-16 DIAGNOSIS — M25.512 CHRONIC LEFT SHOULDER PAIN: Primary | ICD-10-CM

## 2025-05-16 DIAGNOSIS — M75.02 ADHESIVE CAPSULITIS OF LEFT SHOULDER: ICD-10-CM

## 2025-05-16 DIAGNOSIS — E78.2 MIXED HYPERLIPIDEMIA: ICD-10-CM

## 2025-05-16 DIAGNOSIS — Z12.5 ENCOUNTER FOR SCREENING PROSTATE SPECIFIC ANTIGEN (PSA) MEASUREMENT: ICD-10-CM

## 2025-05-16 DIAGNOSIS — G89.29 CHRONIC LEFT SHOULDER PAIN: Primary | ICD-10-CM

## 2025-05-16 PROBLEM — F11.20 CONTINUOUS OPIOID DEPENDENCE (HCC): Status: RESOLVED | Noted: 2022-05-05 | Resolved: 2025-05-16

## 2025-05-16 PROBLEM — R07.2 PRECORDIAL PAIN: Status: RESOLVED | Noted: 2025-01-29 | Resolved: 2025-05-16

## 2025-05-16 PROCEDURE — G2211 COMPLEX E/M VISIT ADD ON: HCPCS | Performed by: FAMILY MEDICINE

## 2025-05-16 PROCEDURE — G0439 PPPS, SUBSEQ VISIT: HCPCS | Performed by: FAMILY MEDICINE

## 2025-05-16 PROCEDURE — 99214 OFFICE O/P EST MOD 30 MIN: CPT | Performed by: FAMILY MEDICINE

## 2025-05-16 RX ORDER — METHYLPREDNISOLONE 4 MG/1
TABLET ORAL
Qty: 21 EACH | Refills: 0 | Status: SHIPPED | OUTPATIENT
Start: 2025-05-16

## 2025-05-16 NOTE — PROGRESS NOTES
Name: Danny Johnson      : 1955      MRN: [id number]  Encounter Provider: Sandra Rosenthal MD  Encounter Date: 2025   Encounter department: [facility]  :  Assessment & Plan  Chronic left shoulder pain  Will prescribed Medrol pack.   Unable to take NSAIDs due to being on Xarelto   Patient plans to see ortho at [facility] [place] where he was previously seen.  Orders:  •  Ambulatory Referral to Orthopedic Surgery; Future    Controlled type 2 diabetes mellitus without complication, without long-term current use of insulin (HCC)  A1c controlled on Metformin --continue   Lab Results   Component Value Date    HGBA1C 6.5 (H) 2025       Orders:  •  Hemoglobin A1C; Future  •  Basic metabolic panel; Future  •  Lipid Panel with Direct LDL reflex; Future    Adhesive capsulitis of left shoulder  Offered Xrays, and PT referral. Pt declined. Will prescribe Medrol.   He plans to see ortho at [facility] [place].     Orders:  •  methylPREDNISolone 4 MG tablet therapy pack; Use as directed on package    Medicare annual wellness visit, subsequent         Encounter for screening prostate specific antigen (PSA) measurement    Orders:  •  PSA, Total Screen; Future    Mixed hyperlipidemia  Stable on statin - continue           Preventive health issues were discussed with patient, and age appropriate screening tests were ordered as noted in patient's After Visit Summary. Personalized health advice and appropriate referrals for health education or preventive services given if needed, as noted in patient's After Visit Summary.    History of Present Illness     Patient is here for a check up -  had labs done which we reviewed in detail.     Having L shoulder pain for past 3-4 weeks.  Saw ortho for this in the past, had injections.  Didn't help.  Has tried OTC pain relievers, didn't help.   He is on Xarelto so can't take NSAIDS.        Patient Care Team:  Sandra Rosenthal MD as PCP - General (Family Medicine)  Cristhian  MD Lizeth Carrillo MD Stephen Strohlein, MD as Endoscopist    Review of Systems   Constitutional:  Negative for activity change, appetite change, fatigue and unexpected weight change.   Respiratory:  Negative for chest tightness and shortness of breath.    Cardiovascular:  Negative for chest pain and leg swelling.   Gastrointestinal:  Negative for abdominal pain.   Musculoskeletal:  Positive for arthralgias.   Neurological:  Negative for headaches.     Medical History Reviewed by provider this encounter:  [facility]       Annual Wellness Visit Questionnaire   Danny is here for his Subsequent Wellness visit. Last Medicare Wellness visit information reviewed, patient interviewed and updates made to the record today.      Health Risk Assessment:   Patient rates overall health as good. Patient feels that their physical health rating is same. Patient is satisfied with their life. Eyesight was rated as same. Hearing was rated as slightly better. Patient feels that their emotional and mental health rating is same. Patients states they are sometimes angry. Patient states they are often unusually tired/fatigued. Pain experienced in the last 7 days has been some. Patient's pain rating has been 9/10. Patient states that he has experienced no weight loss or gain in last 6 months.     Depression Screening:   PHQ-9 Score: 0      Fall Risk Screening:   In the past year, patient has experienced: no history of falling in past year      Home Safety:  Patient does not have trouble with stairs inside or outside of their home. Patient has working smoke alarms and has working carbon monoxide detector. Home safety hazards include: none.     Nutrition:   Current diet is Regular.     Medications:   Patient is currently taking over-the-counter supplements. OTC medications include: see medication list. Patient is able to manage medications.     Activities of Daily Living (ADLs)/Instrumental Activities of Daily Living (IADLs):   Walk and  transfer into and out of bed and chair?: Yes  Dress and groom yourself?: Yes    Bathe or shower yourself?: Yes    Feed yourself? Yes  Do your laundry/housekeeping?: Yes  Manage your money, pay your bills and track your expenses?: Yes  Make your own meals?: Yes    Do your own shopping?: Yes    Previous Hospitalizations:   Any hospitalizations or ED visits within the last 12 months?: No      Advance Care Planning:   Living will: Yes    Advanced directive: Yes      Cognitive Screening:   Provider or family/friend/caregiver concerned regarding cognition?: No    Preventive Screenings      Cardiovascular Screening:    General: Screening Not Indicated, History Lipid Disorder, Risks and Benefits Discussed and Screening Current      Diabetes Screening:     General: Screening Not Indicated, History Diabetes, Risks and Benefits Discussed and Screening Current      Colorectal Cancer Screening:     General: Screening Current      Prostate Cancer Screening:    General: Screening Current      Osteoporosis Screening:    General: Screening Not Indicated      Abdominal Aortic Aneurysm (AAA) Screening:    Risk factors include: age between 65-76 yo and tobacco use        General: Screening Not Indicated      Lung Cancer Screening:     General: Screening Not Indicated      Hepatitis C Screening:    General: Screening Current    Screening, Brief Intervention, and Referral to Treatment (SBIRT)     Screening  Typical number of drinks in a day: 0  Typical number of drinks in a week: 0  Interpretation: Low risk drinking behavior.    Single Item Drug Screening:  How often have you used an illegal drug (including marijuana) or a prescription medication for non-medical reasons in the past year? never    Single Item Drug Screen Score: 0  Interpretation: Negative screen for possible drug use disorder    Social Drivers of Health     Financial Resource Strain: Low Risk  (5/9/2023)    Overall Financial Resource Strain (CARDIA)    • Difficulty of  "Paying Living Expenses: Not very hard   Food Insecurity: No Food Insecurity (5/16/2025)    Nursing - Inadequate Food Risk Classification    • Worried About Running Out of Food in the Last Year: Never true    • Ran Out of Food in the Last Year: Never true   Transportation Needs: No Transportation Needs (5/16/2025)    PRAPARE - Transportation    • Lack of Transportation (Medical): No    • Lack of Transportation (Non-Medical): No   Housing Stability: Low Risk  (5/16/2025)    Housing Stability Vital Sign    • Unable to Pay for Housing in the Last Year: No    • Number of Times Moved in the Last Year: 0    • Homeless in the Last Year: No   Utilities: Not At Risk (5/16/2025)    [facility] Utilities    • Threatened with loss of utilities: No     No results found.    Objective   /60   Pulse 67   Temp 97.6 °F (36.4 °C)   Ht 5' 8\" (1.727 m)   Wt 93.8 kg (206 lb 12.8 oz)   SpO2 98%   BMI 31.44 kg/m²     Physical Exam  Vitals and nursing note reviewed.   Constitutional:       General: He is not in acute distress.     Appearance: He is well-developed. He is not diaphoretic.   HENT:      Head: Normocephalic and atraumatic.      Right Ear: External ear normal.      Left Ear: External ear normal.     Cardiovascular:      Rate and Rhythm: Normal rate and regular rhythm.      Heart sounds: Normal heart sounds. No murmur heard.     No friction rub. No gallop.   Pulmonary:      Effort: Pulmonary effort is normal. No respiratory distress.      Breath sounds: Normal breath sounds. No stridor. No wheezing or rales.     Musculoskeletal:      Left shoulder: Tenderness present. Decreased range of motion.     Skin:     Findings: No rash.     Neurological:      General: No focal deficit present.      Mental Status: He is alert.     Psychiatric:         Mood and Affect: Mood normal.         Behavior: Behavior normal.         Thought Content: Thought content normal.         Judgment: Judgment normal.         "

## 2025-05-16 NOTE — ASSESSMENT & PLAN NOTE
Will prescribed Medrol pack.   Unable to take NSAIDs due to being on Xarelto   Patient plans to see ortho at [facility] where he was previously seen.  Orders:  •  Ambulatory Referral to Orthopedic Surgery; Future

## 2025-05-16 NOTE — ASSESSMENT & PLAN NOTE
A1c controlled on Metformin --continue   Lab Results   Component Value Date    HGBA1C 6.5 (H) 05/07/2025       Orders:  •  Hemoglobin A1C; Future  •  Basic metabolic panel; Future  •  Lipid Panel with Direct LDL reflex; Future

## 2025-06-03 ENCOUNTER — ANESTHESIA EVENT (OUTPATIENT)
Dept: NON INVASIVE DIAGNOSTICS | Facility: HOSPITAL | Age: 70
End: 2025-06-03
Payer: COMMERCIAL

## 2025-06-03 NOTE — ANESTHESIA PREPROCEDURE EVALUATION
"Procedure:  PRE ABLATION YONATAN  Cardiac eps/afib ablation PFA (Chest)    Relevant Problems   ANESTHESIA (within normal limits)      CARDIO   (+) Atrial fibrillation (HCC)   (+) Benign essential hypertension   (+) Mixed hyperlipidemia   (+) Ocular migraine      ENDO   (+) Controlled type 2 diabetes mellitus (HCC)      GI/HEPATIC   (+) Gastroesophageal reflux disease      /RENAL (within normal limits)      NEURO/PSYCH   (+) Anxiety   (+) Chronic left shoulder pain   (+) Mild episode of recurrent major depressive disorder (HCC)   (+) Ocular migraine      PULMONARY   (+) LUKE (obstructive sleep apnea)      Other   (+) CPAP (continuous positive airway pressure) dependence      Nuclear Stress 2/2025:    Stress ECG: The ECG was not diagnostic due to pharmacological (vasodilator) stress.    Stress Function: Left ventricular function post-stress is normal.    Stress Combined Conclusion: The ECG and SPECT imaging portions of the stress study are concordant with no evidence of stress induced myocardial ischemia.    Perfusion: There is a left ventricular perfusion defect present in the inferior wall that is paradoxical, likely representing diaphragmatic attenuation artifact.      Lab Results   Component Value Date    WBC 5.70 05/07/2025    HGB 12.5 05/07/2025    HCT 39.6 05/07/2025    MCV 85 05/07/2025     05/07/2025     Lab Results   Component Value Date    SODIUM 142 05/07/2025    K 4.1 05/07/2025     05/07/2025    CO2 27 05/07/2025    BUN 19 05/07/2025    CREATININE 1.15 05/07/2025    CALCIUM 9.1 05/07/2025     No results found for: \"INR\", \"PROTIME\"  Lab Results   Component Value Date    HGBA1C 6.5 (H) 05/07/2025            Physical Exam    Airway     Mallampati score: II  TM Distance: >3 FB  Neck ROM: full      Cardiovascular  Cardiovascular exam normal    Dental   No notable dental hx     Pulmonary  Pulmonary exam normal     Neurological      Other Findings        Anesthesia Plan  ASA Score- 3     Anesthesia " Type- general with ASA Monitors.         Additional Monitors:     Airway Plan: Oral ETT.    Comment: Discussed with patient plan for anesthesia, as well as risks/benefits, including likely chance of PONV and sore throat, as well as rare possibilities of dental/oropharyngeal/ocular injuries, aspiration, and severe/life-threatening surgical and anesthetic emergencies.  Patient expressed understanding and agreement.  .       Plan Factors-Exercise tolerance (METS): >4 METS.    Chart reviewed. EKG reviewed.  Existing labs reviewed. Patient summary reviewed.                  Induction- intravenous.    Postoperative Plan- .   Monitoring Plan - Monitoring plan - standard ASA monitoring  Post Operative Pain Plan - multimodal analgesia        Informed Consent- Anesthetic plan and risks discussed with patient.  I personally reviewed this patient with the CRNA. Discussed and agreed on the Anesthesia Plan with the CRNA..      NPO Status:  No vitals data found for the desired time range.

## 2025-06-04 ENCOUNTER — TELEPHONE (OUTPATIENT)
Dept: SLEEP CENTER | Facility: CLINIC | Age: 70
End: 2025-06-04

## 2025-06-04 ENCOUNTER — HOSPITAL ENCOUNTER (OUTPATIENT)
Facility: HOSPITAL | Age: 70
Discharge: HOME | End: 2025-06-05
Attending: STUDENT IN AN ORGANIZED HEALTH CARE EDUCATION/TRAINING PROGRAM | Admitting: STUDENT IN AN ORGANIZED HEALTH CARE EDUCATION/TRAINING PROGRAM
Payer: COMMERCIAL

## 2025-06-04 ENCOUNTER — HOSPITAL ENCOUNTER (OUTPATIENT)
Dept: NON INVASIVE DIAGNOSTICS | Facility: HOSPITAL | Age: 70
Discharge: HOME | End: 2025-06-04
Attending: STUDENT IN AN ORGANIZED HEALTH CARE EDUCATION/TRAINING PROGRAM
Payer: COMMERCIAL

## 2025-06-04 ENCOUNTER — ANESTHESIA (OUTPATIENT)
Dept: NON INVASIVE DIAGNOSTICS | Facility: HOSPITAL | Age: 70
End: 2025-06-04
Payer: COMMERCIAL

## 2025-06-04 VITALS
SYSTOLIC BLOOD PRESSURE: 167 MMHG | HEIGHT: 68 IN | BODY MASS INDEX: 30.31 KG/M2 | WEIGHT: 200 LBS | DIASTOLIC BLOOD PRESSURE: 77 MMHG | HEART RATE: 61 BPM

## 2025-06-04 DIAGNOSIS — I48.91 ATRIAL FIBRILLATION, UNSPECIFIED TYPE (HCC): ICD-10-CM

## 2025-06-04 DIAGNOSIS — I48.0 PAROXYSMAL ATRIAL FIBRILLATION (HCC): ICD-10-CM

## 2025-06-04 DIAGNOSIS — I10 BENIGN ESSENTIAL HYPERTENSION: ICD-10-CM

## 2025-06-04 PROBLEM — Z98.890 S/P ABLATION OF ATRIAL FIBRILLATION: Status: ACTIVE | Noted: 2025-06-04

## 2025-06-04 PROBLEM — Z99.89 CPAP (CONTINUOUS POSITIVE AIRWAY PRESSURE) DEPENDENCE: Status: ACTIVE | Noted: 2025-06-04

## 2025-06-04 PROBLEM — Z86.79 S/P ABLATION OF ATRIAL FIBRILLATION: Status: ACTIVE | Noted: 2025-06-04

## 2025-06-04 LAB
ANION GAP SERPL CALCULATED.3IONS-SCNC: 5 MMOL/L (ref 4–13)
AORTIC ROOT: 3.6 CM
ATRIAL RATE: 62 BPM
ATRIAL RATE: 67 BPM
BASOPHILS # BLD AUTO: 0.05 THOUSANDS/ÂΜL (ref 0–0.1)
BASOPHILS NFR BLD AUTO: 1 % (ref 0–1)
BUN SERPL-MCNC: 19 MG/DL (ref 5–25)
CALCIUM SERPL-MCNC: 9.1 MG/DL (ref 8.4–10.2)
CHLORIDE SERPL-SCNC: 109 MMOL/L (ref 96–108)
CO2 SERPL-SCNC: 25 MMOL/L (ref 21–32)
CREAT SERPL-MCNC: 1.04 MG/DL (ref 0.6–1.3)
EOSINOPHIL # BLD AUTO: 0.11 THOUSAND/ÂΜL (ref 0–0.61)
EOSINOPHIL NFR BLD AUTO: 1 % (ref 0–6)
ERYTHROCYTE [DISTWIDTH] IN BLOOD BY AUTOMATED COUNT: 16.3 % (ref 11.6–15.1)
GFR SERPL CREATININE-BSD FRML MDRD: 72 ML/MIN/1.73SQ M
GLUCOSE P FAST SERPL-MCNC: 91 MG/DL (ref 65–99)
GLUCOSE SERPL-MCNC: 140 MG/DL (ref 65–140)
GLUCOSE SERPL-MCNC: 208 MG/DL (ref 65–140)
GLUCOSE SERPL-MCNC: 91 MG/DL (ref 65–140)
HCT VFR BLD AUTO: 40.9 % (ref 36.5–49.3)
HGB BLD-MCNC: 13.1 G/DL (ref 12–17)
IMM GRANULOCYTES # BLD AUTO: 0.02 THOUSAND/UL (ref 0–0.2)
IMM GRANULOCYTES NFR BLD AUTO: 0 % (ref 0–2)
INR PPP: 0.97 (ref 0.85–1.19)
KCT BLD-ACNC: 195 SEC (ref 89–137)
KCT BLD-ACNC: 225 SEC (ref 89–137)
KCT BLD-ACNC: 299 SEC (ref 89–137)
KCT BLD-ACNC: 344 SEC (ref 89–137)
KCT BLD-ACNC: 357 SEC (ref 89–137)
LYMPHOCYTES # BLD AUTO: 2.11 THOUSANDS/ÂΜL (ref 0.6–4.47)
LYMPHOCYTES NFR BLD AUTO: 26 % (ref 14–44)
Lab: 0.6 CM
MAGNESIUM SERPL-MCNC: 1.2 MG/DL (ref 1.9–2.7)
MAGNESIUM SERPL-MCNC: 1.8 MG/DL (ref 1.9–2.7)
MCH RBC QN AUTO: 27.3 PG (ref 26.8–34.3)
MCHC RBC AUTO-ENTMCNC: 32 G/DL (ref 31.4–37.4)
MCV RBC AUTO: 85 FL (ref 82–98)
MONOCYTES # BLD AUTO: 0.5 THOUSAND/ÂΜL (ref 0.17–1.22)
MONOCYTES NFR BLD AUTO: 6 % (ref 4–12)
NEUTROPHILS # BLD AUTO: 5.21 THOUSANDS/ÂΜL (ref 1.85–7.62)
NEUTS SEG NFR BLD AUTO: 66 % (ref 43–75)
NRBC BLD AUTO-RTO: 0 /100 WBCS
P AXIS: 42 DEGREES
P AXIS: 66 DEGREES
PLATELET # BLD AUTO: 290 THOUSANDS/UL (ref 149–390)
PMV BLD AUTO: 9.8 FL (ref 8.9–12.7)
POTASSIUM SERPL-SCNC: 3.8 MMOL/L (ref 3.5–5.3)
PR INTERVAL: 188 MS
PR INTERVAL: 192 MS
PROTHROMBIN TIME: 13.2 SECONDS (ref 12.3–15)
QRS AXIS: -16 DEGREES
QRS AXIS: -43 DEGREES
QRSD INTERVAL: 114 MS
QRSD INTERVAL: 114 MS
QT INTERVAL: 440 MS
QT INTERVAL: 462 MS
QTC INTERVAL: 447 MS
QTC INTERVAL: 488 MS
RBC # BLD AUTO: 4.8 MILLION/UL (ref 3.88–5.62)
SL CV ECHO AV MASS SIZE 1: 0.1 CM
SL CV LV EF: 55
SODIUM SERPL-SCNC: 139 MMOL/L (ref 135–147)
SPECIMEN SOURCE: ABNORMAL
T WAVE AXIS: 65 DEGREES
T WAVE AXIS: 71 DEGREES
VENTRICULAR RATE: 62 BPM
VENTRICULAR RATE: 67 BPM
WBC # BLD AUTO: 8 THOUSAND/UL (ref 4.31–10.16)

## 2025-06-04 PROCEDURE — C1769 GUIDE WIRE: HCPCS | Performed by: STUDENT IN AN ORGANIZED HEALTH CARE EDUCATION/TRAINING PROGRAM

## 2025-06-04 PROCEDURE — 85347 COAGULATION TIME ACTIVATED: CPT

## 2025-06-04 PROCEDURE — C1894 INTRO/SHEATH, NON-LASER: HCPCS | Performed by: STUDENT IN AN ORGANIZED HEALTH CARE EDUCATION/TRAINING PROGRAM

## 2025-06-04 PROCEDURE — 82948 REAGENT STRIP/BLOOD GLUCOSE: CPT

## 2025-06-04 PROCEDURE — NC001 PR NO CHARGE: Performed by: PHYSICIAN ASSISTANT

## 2025-06-04 PROCEDURE — C1759 CATH, INTRA ECHOCARDIOGRAPHY: HCPCS | Performed by: STUDENT IN AN ORGANIZED HEALTH CARE EDUCATION/TRAINING PROGRAM

## 2025-06-04 PROCEDURE — C1732 CATH, EP, DIAG/ABL, 3D/VECT: HCPCS | Performed by: STUDENT IN AN ORGANIZED HEALTH CARE EDUCATION/TRAINING PROGRAM

## 2025-06-04 PROCEDURE — 85025 COMPLETE CBC W/AUTO DIFF WBC: CPT | Performed by: PHYSICIAN ASSISTANT

## 2025-06-04 PROCEDURE — 93320 DOPPLER ECHO COMPLETE: CPT | Performed by: INTERNAL MEDICINE

## 2025-06-04 PROCEDURE — 93656 COMPRE EP EVAL ABLTJ ATR FIB: CPT | Performed by: STUDENT IN AN ORGANIZED HEALTH CARE EDUCATION/TRAINING PROGRAM

## 2025-06-04 PROCEDURE — 83735 ASSAY OF MAGNESIUM: CPT | Performed by: STUDENT IN AN ORGANIZED HEALTH CARE EDUCATION/TRAINING PROGRAM

## 2025-06-04 PROCEDURE — 93312 ECHO TRANSESOPHAGEAL: CPT | Performed by: INTERNAL MEDICINE

## 2025-06-04 PROCEDURE — 93010 ELECTROCARDIOGRAM REPORT: CPT | Performed by: INTERNAL MEDICINE

## 2025-06-04 PROCEDURE — 85610 PROTHROMBIN TIME: CPT | Performed by: PHYSICIAN ASSISTANT

## 2025-06-04 PROCEDURE — 93325 DOPPLER ECHO COLOR FLOW MAPG: CPT | Performed by: INTERNAL MEDICINE

## 2025-06-04 PROCEDURE — C1760 CLOSURE DEV, VASC: HCPCS | Performed by: STUDENT IN AN ORGANIZED HEALTH CARE EDUCATION/TRAINING PROGRAM

## 2025-06-04 PROCEDURE — 83735 ASSAY OF MAGNESIUM: CPT | Performed by: PHYSICIAN ASSISTANT

## 2025-06-04 PROCEDURE — C1766 INTRO/SHEATH,STRBLE,NON-PEEL: HCPCS | Performed by: STUDENT IN AN ORGANIZED HEALTH CARE EDUCATION/TRAINING PROGRAM

## 2025-06-04 PROCEDURE — 80048 BASIC METABOLIC PNL TOTAL CA: CPT | Performed by: PHYSICIAN ASSISTANT

## 2025-06-04 PROCEDURE — C1733 CATH, EP, OTHR THAN COOL-TIP: HCPCS | Performed by: STUDENT IN AN ORGANIZED HEALTH CARE EDUCATION/TRAINING PROGRAM

## 2025-06-04 PROCEDURE — 93312 ECHO TRANSESOPHAGEAL: CPT

## 2025-06-04 PROCEDURE — 93005 ELECTROCARDIOGRAM TRACING: CPT

## 2025-06-04 DEVICE — PERCLOSE™ PROSTYLE™ SUTURE-MEDIATED CLOSURE AND REPAIR SYSTEM
Type: IMPLANTABLE DEVICE | Site: GROIN | Status: FUNCTIONAL
Brand: PERCLOSE™ PROSTYLE™

## 2025-06-04 RX ORDER — PROTAMINE SULFATE 10 MG/ML
INJECTION, SOLUTION INTRAVENOUS AS NEEDED
Status: DISCONTINUED | OUTPATIENT
Start: 2025-06-04 | End: 2025-06-04

## 2025-06-04 RX ORDER — ESCITALOPRAM OXALATE 10 MG/1
10 TABLET ORAL DAILY
Status: DISCONTINUED | OUTPATIENT
Start: 2025-06-04 | End: 2025-06-05 | Stop reason: HOSPADM

## 2025-06-04 RX ORDER — PROPOFOL 10 MG/ML
INJECTION, EMULSION INTRAVENOUS AS NEEDED
Status: DISCONTINUED | OUTPATIENT
Start: 2025-06-04 | End: 2025-06-04

## 2025-06-04 RX ORDER — SODIUM CHLORIDE 9 MG/ML
INJECTION, SOLUTION INTRAVENOUS CONTINUOUS PRN
Status: DISCONTINUED | OUTPATIENT
Start: 2025-06-04 | End: 2025-06-04

## 2025-06-04 RX ORDER — INSULIN LISPRO 100 [IU]/ML
1-5 INJECTION, SOLUTION INTRAVENOUS; SUBCUTANEOUS
Status: DISCONTINUED | OUTPATIENT
Start: 2025-06-04 | End: 2025-06-05 | Stop reason: HOSPADM

## 2025-06-04 RX ORDER — FLECAINIDE ACETATE 100 MG/1
100 TABLET ORAL 2 TIMES DAILY
Status: DISCONTINUED | OUTPATIENT
Start: 2025-06-04 | End: 2025-06-05 | Stop reason: HOSPADM

## 2025-06-04 RX ORDER — DILTIAZEM HYDROCHLORIDE 180 MG/1
360 CAPSULE, COATED, EXTENDED RELEASE ORAL DAILY
Status: DISCONTINUED | OUTPATIENT
Start: 2025-06-04 | End: 2025-06-05 | Stop reason: HOSPADM

## 2025-06-04 RX ORDER — ROCURONIUM BROMIDE 10 MG/ML
INJECTION, SOLUTION INTRAVENOUS AS NEEDED
Status: DISCONTINUED | OUTPATIENT
Start: 2025-06-04 | End: 2025-06-04

## 2025-06-04 RX ORDER — ATORVASTATIN CALCIUM 40 MG/1
40 TABLET, FILM COATED ORAL DAILY
Status: DISCONTINUED | OUTPATIENT
Start: 2025-06-04 | End: 2025-06-05 | Stop reason: HOSPADM

## 2025-06-04 RX ORDER — PANTOPRAZOLE SODIUM 40 MG/1
40 TABLET, DELAYED RELEASE ORAL
Status: DISCONTINUED | OUTPATIENT
Start: 2025-06-05 | End: 2025-06-05 | Stop reason: HOSPADM

## 2025-06-04 RX ORDER — LIDOCAINE HYDROCHLORIDE 10 MG/ML
INJECTION, SOLUTION EPIDURAL; INFILTRATION; INTRACAUDAL; PERINEURAL CODE/TRAUMA/SEDATION MEDICATION
Status: DISCONTINUED | OUTPATIENT
Start: 2025-06-04 | End: 2025-06-04 | Stop reason: HOSPADM

## 2025-06-04 RX ORDER — ONDANSETRON 2 MG/ML
INJECTION INTRAMUSCULAR; INTRAVENOUS AS NEEDED
Status: DISCONTINUED | OUTPATIENT
Start: 2025-06-04 | End: 2025-06-04

## 2025-06-04 RX ORDER — FENTANYL CITRATE/PF 50 MCG/ML
25 SYRINGE (ML) INJECTION
Status: DISCONTINUED | OUTPATIENT
Start: 2025-06-04 | End: 2025-06-04 | Stop reason: HOSPADM

## 2025-06-04 RX ORDER — ACETAMINOPHEN 325 MG/1
650 TABLET ORAL EVERY 4 HOURS PRN
Status: DISCONTINUED | OUTPATIENT
Start: 2025-06-04 | End: 2025-06-05 | Stop reason: HOSPADM

## 2025-06-04 RX ORDER — HEPARIN SODIUM 1000 [USP'U]/ML
INJECTION, SOLUTION INTRAVENOUS; SUBCUTANEOUS CODE/TRAUMA/SEDATION MEDICATION
Status: DISCONTINUED | OUTPATIENT
Start: 2025-06-04 | End: 2025-06-04 | Stop reason: HOSPADM

## 2025-06-04 RX ORDER — FLECAINIDE ACETATE 100 MG/1
100 TABLET ORAL 2 TIMES DAILY
Status: DISCONTINUED | OUTPATIENT
Start: 2025-06-04 | End: 2025-06-04

## 2025-06-04 RX ORDER — ALPRAZOLAM 0.25 MG
0.25 TABLET ORAL DAILY PRN
Status: DISCONTINUED | OUTPATIENT
Start: 2025-06-04 | End: 2025-06-05 | Stop reason: HOSPADM

## 2025-06-04 RX ORDER — EPHEDRINE SULFATE 50 MG/ML
INJECTION INTRAVENOUS AS NEEDED
Status: DISCONTINUED | OUTPATIENT
Start: 2025-06-04 | End: 2025-06-04

## 2025-06-04 RX ORDER — FENTANYL CITRATE 50 UG/ML
INJECTION, SOLUTION INTRAMUSCULAR; INTRAVENOUS AS NEEDED
Status: DISCONTINUED | OUTPATIENT
Start: 2025-06-04 | End: 2025-06-04

## 2025-06-04 RX ORDER — FENOFIBRATE 145 MG/1
145 TABLET, FILM COATED ORAL DAILY
Status: DISCONTINUED | OUTPATIENT
Start: 2025-06-04 | End: 2025-06-05 | Stop reason: HOSPADM

## 2025-06-04 RX ORDER — ONDANSETRON 2 MG/ML
4 INJECTION INTRAMUSCULAR; INTRAVENOUS ONCE AS NEEDED
Status: DISCONTINUED | OUTPATIENT
Start: 2025-06-04 | End: 2025-06-04 | Stop reason: HOSPADM

## 2025-06-04 RX ORDER — FLECAINIDE ACETATE 100 MG/1
100 TABLET ORAL 2 TIMES DAILY
Status: DISCONTINUED | OUTPATIENT
Start: 2025-06-05 | End: 2025-06-04

## 2025-06-04 RX ORDER — HEPARIN SODIUM 10000 [USP'U]/100ML
INJECTION, SOLUTION INTRAVENOUS
Status: DISCONTINUED | OUTPATIENT
Start: 2025-06-04 | End: 2025-06-04 | Stop reason: HOSPADM

## 2025-06-04 RX ORDER — DILTIAZEM HYDROCHLORIDE 180 MG/1
360 CAPSULE, COATED, EXTENDED RELEASE ORAL DAILY
Status: DISCONTINUED | OUTPATIENT
Start: 2025-06-04 | End: 2025-06-04

## 2025-06-04 RX ORDER — SODIUM CHLORIDE 9 MG/ML
20 INJECTION, SOLUTION INTRAVENOUS ONCE
Status: COMPLETED | OUTPATIENT
Start: 2025-06-04 | End: 2025-06-04

## 2025-06-04 RX ORDER — GLYCOPYRROLATE 0.2 MG/ML
INJECTION INTRAMUSCULAR; INTRAVENOUS AS NEEDED
Status: DISCONTINUED | OUTPATIENT
Start: 2025-06-04 | End: 2025-06-04

## 2025-06-04 RX ORDER — DEXAMETHASONE SODIUM PHOSPHATE 10 MG/ML
INJECTION, SOLUTION INTRAMUSCULAR; INTRAVENOUS AS NEEDED
Status: DISCONTINUED | OUTPATIENT
Start: 2025-06-04 | End: 2025-06-04

## 2025-06-04 RX ORDER — MAGNESIUM SULFATE HEPTAHYDRATE 40 MG/ML
INJECTION, SOLUTION INTRAVENOUS AS NEEDED
Status: DISCONTINUED | OUTPATIENT
Start: 2025-06-04 | End: 2025-06-04

## 2025-06-04 RX ORDER — INSULIN LISPRO 100 [IU]/ML
1-5 INJECTION, SOLUTION INTRAVENOUS; SUBCUTANEOUS
Status: DISCONTINUED | OUTPATIENT
Start: 2025-06-05 | End: 2025-06-04

## 2025-06-04 RX ADMIN — ONDANSETRON 4 MG: 2 INJECTION INTRAMUSCULAR; INTRAVENOUS at 13:59

## 2025-06-04 RX ADMIN — EPHEDRINE SULFATE 5 MG: 50 INJECTION INTRAVENOUS at 14:39

## 2025-06-04 RX ADMIN — INSULIN LISPRO 1 UNITS: 100 INJECTION, SOLUTION INTRAVENOUS; SUBCUTANEOUS at 22:34

## 2025-06-04 RX ADMIN — SODIUM CHLORIDE 20 ML/HR: 0.9 INJECTION, SOLUTION INTRAVENOUS at 11:03

## 2025-06-04 RX ADMIN — ROCURONIUM 30 MG: 50 INJECTION, SOLUTION INTRAVENOUS at 14:53

## 2025-06-04 RX ADMIN — SODIUM CHLORIDE: 0.9 INJECTION, SOLUTION INTRAVENOUS at 13:44

## 2025-06-04 RX ADMIN — ROCURONIUM 25 MG: 50 INJECTION, SOLUTION INTRAVENOUS at 16:29

## 2025-06-04 RX ADMIN — FLECAINIDE ACETATE 100 MG: 100 TABLET ORAL at 22:25

## 2025-06-04 RX ADMIN — SUGAMMADEX 200 MG: 100 INJECTION, SOLUTION INTRAVENOUS at 16:42

## 2025-06-04 RX ADMIN — PROTAMINE SULFATE 50 MG: 10 INJECTION, SOLUTION INTRAVENOUS at 16:33

## 2025-06-04 RX ADMIN — DEXAMETHASONE SODIUM PHOSPHATE 10 MG: 10 INJECTION, SOLUTION INTRAMUSCULAR; INTRAVENOUS at 13:59

## 2025-06-04 RX ADMIN — PHENYLEPHRINE HYDROCHLORIDE 20 MCG/MIN: 50 INJECTION INTRAVENOUS at 15:07

## 2025-06-04 RX ADMIN — ROCURONIUM 20 MG: 50 INJECTION, SOLUTION INTRAVENOUS at 14:58

## 2025-06-04 RX ADMIN — FENTANYL CITRATE 100 MCG: 50 INJECTION INTRAMUSCULAR; INTRAVENOUS at 13:59

## 2025-06-04 RX ADMIN — ROCURONIUM 50 MG: 50 INJECTION, SOLUTION INTRAVENOUS at 13:59

## 2025-06-04 RX ADMIN — GLYCOPYRROLATE 0.3 MG: 0.2 INJECTION, SOLUTION INTRAMUSCULAR; INTRAVENOUS at 15:22

## 2025-06-04 RX ADMIN — ROCURONIUM 25 MG: 50 INJECTION, SOLUTION INTRAVENOUS at 15:51

## 2025-06-04 RX ADMIN — MAGNESIUM SULFATE HEPTAHYDRATE 2 G: 40 INJECTION, SOLUTION INTRAVENOUS at 14:29

## 2025-06-04 RX ADMIN — SODIUM CHLORIDE: 0.9 INJECTION, SOLUTION INTRAVENOUS at 16:23

## 2025-06-04 RX ADMIN — PROPOFOL 150 MG: 10 INJECTION, EMULSION INTRAVENOUS at 13:59

## 2025-06-04 RX ADMIN — FENTANYL CITRATE 50 MCG: 50 INJECTION INTRAMUSCULAR; INTRAVENOUS at 16:29

## 2025-06-04 NOTE — TELEPHONE ENCOUNTER
Confirmation of order form for full face cushion received via fax from [facility]    Form placed in Dr. Lewis's inbox in [facility] to complete.    Completed CMN form to be faxed to [phone].

## 2025-06-04 NOTE — ASSESSMENT & PLAN NOTE
-afib ablation today  -2017 afib ablation  -maintained on flecainide 100mg bid, diltiazem 360mg qd, xarelto 20mg    -held this am

## 2025-06-04 NOTE — DISCHARGE INSTR - AVS FIRST PAGE
Medication Plan:    Take:   Xarelto 20mg daily. Do not stop blood thinner until discussion with provider at post op appointment.     Post Procedure Care instructions:    For 7 days:  You may shower after the first 24 hours   Allow gentle soap and water to wash over incision.  Do not scrub. Pat to dry   If chaffing with undergarments, wear bandage during the day to prevent irritation. Maximum 5 days and change bandage daily.  Do not use lotions/powders/creams    You may walk or go up/down stairs      Restrictions for 1 week:   Do not lift greater than 10lbs    Avoid running/jumping    No submerging wound in water (No bath/hot tubs/pools/etc)    Normal healing process   You may have bruising that extends into your genitalia and/or down your thigh toward the knee. This can take several weeks to resolve.  Pain may worsen over the first 48 hours as you increase your activity and then will improve   Tylenol (acetaminophen)  Ice   You may have a small pea sized lump   Your arrhythmia may reoccur in the next 3 months  This is due to inflammation/irritation from the procedure    When to call clinic:    Redness or swelling at incision site   Bleeding or expanding lump in groin    Opening and/or drainage from the incision   Temperature >100.4 F   If your arrhythmia reoccurs call if:  You are symptomatic (dizziness/lightheadedness/heart racing/etc)    If you have any questions:   [phone] 8:30am-5:30pm  [phone] After hours  [phone] Appointments

## 2025-06-04 NOTE — ANESTHESIA POSTPROCEDURE EVALUATION
Post-Op Assessment Note    CV Status:  Stable  Pain Score: 0    Pain management: adequate       Mental Status:  Awake and sleepy   Hydration Status:  Stable   PONV Controlled:  None   Airway Patency:  Patent     Post Op Vitals Reviewed: Yes    No anethesia notable event occurred.    Staff: CRNA           Last Filed PACU Vitals:  Vitals Value Taken Time   Temp 99.4    Pulse 67 06/04/25 17:03   /72 06/04/25 17:03   Resp 16    SpO2 96 % 06/04/25 17:03   Vitals shown include unfiled device data.

## 2025-06-04 NOTE — H&P
H&P Exam - Electrophysiology - Cardiology   Danny Johnson 70 y.o. male MRN: [id number]  Unit/Bed#: BE CATH LAB ROOM Encounter: [id number]    Assessment & Plan  Atrial fibrillation (HCC)  -afib ablation today  -2017 afib ablation  -maintained on flecainide 100mg bid, diltiazem 360mg qd, xarelto 20mg    -held this am  Benign essential hypertension  -monitor, continue home meds  Controlled type 2 diabetes mellitus (HCC)    Lab Results   Component Value Date    HGBA1C 6.5 (H) 05/07/2025   -accuchecks  Mixed hyperlipidemia  Maintained on fenofibrate and lipitor  LUKE (obstructive sleep apnea)  -cpap qhs  CPAP (continuous positive airway pressure) dependence      History of Present Illness   HPI:  Danny Johnson is a 70 y.o. year old male with PMH of PAF, LUKE on CPAP, depression, DM 2, GERD, HLD, HTN who presents today for an A-fib ablation.  Patient saw Dr. Botello in the office and antiarrhythmics and ablation were discussed.  Patient has since managed his tooth infection and currently off abx. Pt without new complaints. Pt states he feels like the heavy meals triggers his afib. Procedure discussed. F/u and instructions discussed with family at bedside.     EKG: nsr in 60's    NM perfusion stress 2/4/2025  Interpretation Summary  Show Result Comparison     Stress ECG: The ECG was not diagnostic due to pharmacological (vasodilator) stress.    Stress Function: Left ventricular function post-stress is normal.    Stress Combined Conclusion: The ECG and SPECT imaging portions of the stress study are concordant with no evidence of stress induced myocardial ischemia.    Perfusion: There is a left ventricular perfusion defect present in the inferior wall that is paradoxical, likely representing diaphragmatic attenuation artifact.    Review of Systems  ROS as noted above, otherwise 12 point review of systems was performed and is negative.     Historical Information   Past Medical History[1]  Past Surgical  "History[2]  Family History: Family History[3]    Social History   Social History     Substance and Sexual Activity   Alcohol Use Yes    Comment: RARE     Social History     Substance and Sexual Activity   Drug Use No     Tobacco Use History[4]    Meds/Allergies   all medications and allergies reviewed  Home Medications:   Medications Prior to Admission:     ALPRAZolam (XANAX) 0.25 mg tablet    atorvastatin (LIPITOR) 40 mg tablet    diltiazem (CARDIZEM CD) 360 MG 24 hr capsule    escitalopram (LEXAPRO) 10 mg tablet    fenofibrate (TRICOR) 145 mg tablet    flecainide (TAMBOCOR) 100 mg tablet    glucose blood (OneTouch Ultra) test strip    metFORMIN (GLUCOPHAGE) 500 mg tablet    methylPREDNISolone 4 MG tablet therapy pack    omeprazole (PriLOSEC) 40 MG capsule    tobramycin (TOBREX) 0.3 % OINT    Xarelto 20 MG tablet    Allergies[5]    Objective   Vitals: Blood pressure 167/77, pulse 61, temperature 97.9 °F (36.6 °C), temperature source Temporal, resp. rate 18, height 5' 8\" (1.727 m), weight 90.7 kg (200 lb), SpO2 97%.  Orthostatic Blood Pressures      Flowsheet Row Most Recent Value   Blood Pressure 167/77 filed at 06/04/2025 1100            No intake or output data in the 24 hours ending 06/04/25 1248    Invasive Devices       Peripheral Intravenous Line  Duration             Peripheral IV 06/04/25 Dorsal (posterior);Right Forearm <1 day    Peripheral IV 06/04/25 Left;Ventral (anterior) Forearm <1 day                    Physical Exam vss  GEN: NAD, alert and oriented, well appearing  SKIN: dry without significant lesions or rashes  HEENT: NCAT, PERRL, EOMs intact  NECK: No JVD or carotid bruits appreciated  CARDIOVASCULAR: RRR, normal S1, S2 without murmurs, rubs, or gallops appreciated  LUNGS: Clear to auscultation bilaterally without wheezes, rhonchi, or rales  ABDOMEN: Soft, nontender, nondistended  EXTREMITIES/VASCULAR: perfused without clubbing, cyanosis, or edema b/l  PSYCH: Normal mood and affect  NEURO: CN " ll-Xll grossly intact    Lab Results: I have personally reviewed pertinent lab results.    Results from last 7 days   Lab Units 06/04/25  1102   WBC Thousand/uL 8.00   HEMOGLOBIN g/dL 13.1   HEMATOCRIT % 40.9   PLATELETS Thousands/uL 290     Results from last 7 days   Lab Units 06/04/25  1102   POTASSIUM mmol/L 3.8   CHLORIDE mmol/L 109*   CO2 mmol/L 25   BUN mg/dL 19   CREATININE mg/dL 1.04   CALCIUM mg/dL 9.1     Results from last 7 days   Lab Units 06/04/25  1102   INR  0.97     Results from last 7 days   Lab Units 06/04/25  1102   MAGNESIUM mg/dL 1.2*         Imaging: Results Review Statement: I personally reviewed the following image studies in PACS and associated radiology reports: Echocardiogram. My interpretation of the radiology images/reports is:  .  No results found for this or any previous visit.      Code Status: No Order    ** Please Note: Dictation voice to text software may have been used in the creation of this document. **        [1]   Past Medical History:  Diagnosis Date    Arthritis     Atrial fibrillation (HCC)     Candidiasis, cutaneous     Colon polyp     Colon, diverticulosis     CPAP (continuous positive airway pressure) dependence     Depression     Diabetes mellitus (HCC)     Diverticulosis     GERD (gastroesophageal reflux disease)     Hyperlipidemia     Hypertension     Internal hemorrhoids     Irregular heart beat     Pneumonia     Shortness of breath     Sleep apnea     uses cpap    Vertigo of central origin    [2]   Past Surgical History:  Procedure Laterality Date    CARDIAC ELECTROPHYSIOLOGY MAPPING AND ABLATION      COLONOSCOPY      EGD AND COLONOSCOPY      ME COLONOSCOPY FLX DX W/COLLJ SPEC WHEN PFRMD N/A 8/29/2018    Procedure: COLONOSCOPY;  Surgeon: Praveen Parrish MD;  Location: [facility];  Service: Gastroenterology    ME ESOPHAGOGASTRODUODENOSCOPY TRANSORAL DIAGNOSTIC N/A 10/10/2017    Procedure: ESOPHAGOGASTRODUODENOSCOPY (EGD);  Surgeon: Praveen Parrish MD;  Location:  [facility];  Service: Gastroenterology    TONSILLECTOMY     [3]   Family History  Problem Relation Name Age of Onset    Other Mother          CABG,Hepatic Disorder    Coronary artery disease Mother      Other Father          CABG    Diabetes Father      Cancer Family          Gastrointestinal   [4]   Social History  Tobacco Use   Smoking Status Former    Current packs/day: 0.00    Average packs/day: 1 pack/day for 12.4 years (12.4 ttl pk-yrs)    Types: Cigarettes    Start date: 1962    Quit date: 10/10/1974    Years since quittin.6    Passive exposure: Never   Smokeless Tobacco Never   [5] No Known Allergies

## 2025-06-05 VITALS
HEIGHT: 68 IN | TEMPERATURE: 98.5 F | HEART RATE: 60 BPM | BODY MASS INDEX: 30.62 KG/M2 | OXYGEN SATURATION: 97 % | SYSTOLIC BLOOD PRESSURE: 149 MMHG | RESPIRATION RATE: 17 BRPM | DIASTOLIC BLOOD PRESSURE: 68 MMHG | WEIGHT: 202 LBS

## 2025-06-05 LAB
ANION GAP SERPL CALCULATED.3IONS-SCNC: 5 MMOL/L (ref 4–13)
BUN SERPL-MCNC: 23 MG/DL (ref 5–25)
CALCIUM SERPL-MCNC: 8.4 MG/DL (ref 8.4–10.2)
CHLORIDE SERPL-SCNC: 111 MMOL/L (ref 96–108)
CO2 SERPL-SCNC: 25 MMOL/L (ref 21–32)
CREAT SERPL-MCNC: 0.97 MG/DL (ref 0.6–1.3)
ERYTHROCYTE [DISTWIDTH] IN BLOOD BY AUTOMATED COUNT: 16.9 % (ref 11.6–15.1)
GFR SERPL CREATININE-BSD FRML MDRD: 78 ML/MIN/1.73SQ M
GLUCOSE SERPL-MCNC: 123 MG/DL (ref 65–140)
GLUCOSE SERPL-MCNC: 126 MG/DL (ref 65–140)
GLUCOSE SERPL-MCNC: 160 MG/DL (ref 65–140)
HCT VFR BLD AUTO: 38.9 % (ref 36.5–49.3)
HGB BLD-MCNC: 11.3 G/DL (ref 12–17)
INR PPP: 1 (ref 0.85–1.19)
MCH RBC QN AUTO: 26.7 PG (ref 26.8–34.3)
MCHC RBC AUTO-ENTMCNC: 29 G/DL (ref 31.4–37.4)
MCV RBC AUTO: 92 FL (ref 82–98)
PLATELET # BLD AUTO: 248 THOUSANDS/UL (ref 149–390)
PMV BLD AUTO: 10.1 FL (ref 8.9–12.7)
POTASSIUM SERPL-SCNC: 4.1 MMOL/L (ref 3.5–5.3)
PROTHROMBIN TIME: 13.5 SECONDS (ref 12.3–15)
RBC # BLD AUTO: 4.24 MILLION/UL (ref 3.88–5.62)
SODIUM SERPL-SCNC: 141 MMOL/L (ref 135–147)
WBC # BLD AUTO: 8.76 THOUSAND/UL (ref 4.31–10.16)

## 2025-06-05 PROCEDURE — 85027 COMPLETE CBC AUTOMATED: CPT | Performed by: PHYSICIAN ASSISTANT

## 2025-06-05 PROCEDURE — 85610 PROTHROMBIN TIME: CPT | Performed by: PHYSICIAN ASSISTANT

## 2025-06-05 PROCEDURE — 82948 REAGENT STRIP/BLOOD GLUCOSE: CPT

## 2025-06-05 PROCEDURE — NC001 PR NO CHARGE: Performed by: PHYSICIAN ASSISTANT

## 2025-06-05 PROCEDURE — 80048 BASIC METABOLIC PNL TOTAL CA: CPT | Performed by: PHYSICIAN ASSISTANT

## 2025-06-05 RX ADMIN — ESCITALOPRAM OXALATE 10 MG: 10 TABLET ORAL at 10:06

## 2025-06-05 RX ADMIN — RIVAROXABAN 20 MG: 20 TABLET, FILM COATED ORAL at 10:25

## 2025-06-05 RX ADMIN — INSULIN LISPRO 1 UNITS: 100 INJECTION, SOLUTION INTRAVENOUS; SUBCUTANEOUS at 10:07

## 2025-06-05 RX ADMIN — FLECAINIDE ACETATE 100 MG: 100 TABLET ORAL at 10:06

## 2025-06-05 RX ADMIN — DILTIAZEM HYDROCHLORIDE 360 MG: 180 CAPSULE, COATED, EXTENDED RELEASE ORAL at 10:06

## 2025-06-05 RX ADMIN — PANTOPRAZOLE SODIUM 40 MG: 40 TABLET, DELAYED RELEASE ORAL at 05:43

## 2025-06-05 RX ADMIN — FENOFIBRATE 145 MG: 145 TABLET ORAL at 10:05

## 2025-06-05 NOTE — DISCHARGE SUMMARY
Discharge Summary - Electrophysiology   Name: Danny Johnson 70 y.o. male I MRN: [id number]  Unit/Bed#: [facility] 426-01 I Date of Admission: 6/4/2025   Date of Service: 6/5/2025 I Hospital Day: 0      Discharge Summary - Danny Johnson 70 y.o. male MRN: [id number]    Unit/Bed#: [facility] 426-01 Encounter: [id number]      Admission Date: 6/4/2025   Discharge Date: 6/5/25    Discharge Diagnosis:   Assessment & Plan  Atrial fibrillation (HCC)  -afib ablation today  -2017 afib ablation  -maintained on flecainide 100mg bid, diltiazem 360mg qd, xarelto 20mg    -held this am  -s/p PVI PFA ablation by Dr. Botello   - no med changes   - patient to drive himself home - urged him to get ride   Benign essential hypertension  -monitor, continue home meds  Controlled type 2 diabetes mellitus (HCC)    Lab Results   Component Value Date    HGBA1C 6.5 (H) 05/07/2025   -accuchecks  Mixed hyperlipidemia  Maintained on fenofibrate and lipitor  LUKE (obstructive sleep apnea)  -cpap qhs  CPAP (continuous positive airway pressure) dependence    S/P afib ablation 6/4/2025          Procedures Performed:   PFA ablation of a fib with PVI   Orders Placed This Encounter   Procedures    Cardiac ep lab eps/ablations       Consultants: None    HPI: Please refer to the initial history and physical as well as procedure notes for full details. Briefly, Danny Johnson is a 70 y.o. year old male with  PMH of PAF, LUKE on CPAP, depression, DM 2, GERD, HLD, HTN who presents for A-fib ablation.  Patient underwent previous ablation in 2017 and has been maintained on flecainide 100 mg twice daily as well as diltiazem 360 mg.  He has been anticoagulated on Xarelto 20 mg for C2 V score of 3.  He initially presented to undergo the procedure with Dr. Botello.      Hospital Course: Danny Johnson presented at his baseline state of health. After the procedure was explained in detail and consent was obtained, he underwent PVI ablation using PFA without  complications. He tolerated the procedure well. He was then monitored overnight for further observation.    There were no acute issues or events overnight. The following morning He denied all cardiac complaints, including chest pain/pressure, dyspnea, palpitations, dizziness, lightheadedness, or syncope. His vital signs were reviewed and labs are stable. Telemetry showed sinus rhythm no significant rhythm disturbances. His groins were soft without significant hematoma or recurrent bleeding. Physical exam on the day of discharge was as follows:    GEN: NAD, alert and oriented, well appearing  SKIN: dry without significant lesions or rashes  HEENT: NCAT, PERRL, EOMs intact  NECK: No JVD or carotid bruits appreciated  CARDIOVASCULAR: RRR, normal S1, S2 without murmurs, rubs, or gallops appreciated  LUNGS: Clear to auscultation bilaterally without wheezes, rhonchi, or rales  ABDOMEN: Soft, nontender, nondistended  EXTREMITIES/VASCULAR: perfused without clubbing, cyanosis, or edema b/l  PSYCH: Normal mood and affect  NEURO: CN ll-Xll grossly intact    He was given routine post ablation discharge instructions and restrictions, and these were explained in detail. He was given a follow up appointment with Luis Manuel Sierra PA-C, and he was instructed to follow up with his primary cardiologist as previously instructed.    In terms of his medications, there will be no changes to his medications. He will continue blood thinners and other AAD/Rate control until he is able to follow up in the EP office.     He is stable for discharge at this time with all questions answered. He was discussed in detail with Dr. Botello who is in agreement with this discharge summary.     Discharge Medications:  See after visit summary for reconciled discharge medications provided to patient and family.      Medications Prior to Admission:     ALPRAZolam (XANAX) 0.25 mg tablet    atorvastatin (LIPITOR) 40 mg tablet    diltiazem (CARDIZEM CD) 360 MG 24 hr  capsule    escitalopram (LEXAPRO) 10 mg tablet    fenofibrate (TRICOR) 145 mg tablet    flecainide (TAMBOCOR) 100 mg tablet    glucose blood (OneTouch Ultra) test strip    metFORMIN (GLUCOPHAGE) 500 mg tablet    methylPREDNISolone 4 MG tablet therapy pack    omeprazole (PriLOSEC) 40 MG capsule    tobramycin (TOBREX) 0.3 % OINT    Xarelto 20 MG tablet      Pertininet Labs/diagnostics:  CBC with diff:   Results from last 7 days   Lab Units 06/05/25  0551 06/04/25  1102   WBC Thousand/uL 8.76 8.00   HEMOGLOBIN g/dL 11.3* 13.1   HEMATOCRIT % 38.9 40.9   MCV fL 92 85   PLATELETS Thousands/uL 248 290   RBC Million/uL 4.24 4.80   MCH pg 26.7* 27.3   MCHC g/dL 29.0* 32.0   RDW % 16.9* 16.3*   MPV fL 10.1 9.8   NRBC AUTO /100 WBCs  --  0       BMP:  Results from last 7 days   Lab Units 06/05/25  0551 06/04/25  1102   POTASSIUM mmol/L 4.1 3.8   CHLORIDE mmol/L 111* 109*   CO2 mmol/L 25 25   BUN mg/dL 23 19   CREATININE mg/dL 0.97 1.04   CALCIUM mg/dL 8.4 9.1       Magnesium:   Results from last 7 days   Lab Units 06/04/25  1608 06/04/25  1102   MAGNESIUM mg/dL 1.8* 1.2*       Coags:   Results from last 7 days   Lab Units 06/05/25  0551 06/04/25  1102   INR  1.00 0.97         Complications: none    Condition at Discharge: good     Discharge instructions/Information to patient and family:   See after visit summary for information provided to patient and family.      Provisions for Follow-Up Care:  See after visit summary for information related to follow-up care and any pertinent home health orders.      Disposition: Home    Planned Readmission: No    Discharge Statement   I spent 45 minutes minutes discharging the patient. This time was spent on the day of discharge. I had direct contact with the patient on the day of discharge. Additional documentation is required if more than 30 minutes were spent on discharge. Evaluating the incision, discussing discharge instructions and restrictions, arranging follow up appointments,  discussing medications

## 2025-06-05 NOTE — ASSESSMENT & PLAN NOTE
-afib ablation today  -2017 afib ablation  -maintained on flecainide 100mg bid, diltiazem 360mg qd, xarelto 20mg    -held this am  -s/p PVI PFA ablation by Dr. Botello   - no med changes   - patient to drive himself home - urged him to get ride

## 2025-06-05 NOTE — ANESTHESIA POSTPROCEDURE EVALUATION
Post-Op Assessment Note    CV Status:  Stable    Pain management: adequate       Hydration Status:  Stable   Airway Patency:  Patent     Post Op Vitals Reviewed: Yes    No anethesia notable event occurred.    Staff: Anesthesiologist           Last Filed PACU Vitals:  Vitals Value Taken Time   Temp 99.1 °F (37.3 °C) 06/04/25 20:23   Pulse 73 06/04/25 20:24   /71 06/04/25 20:23   Resp 25 06/04/25 20:16   SpO2 93 % 06/04/25 20:24   Vitals shown include unfiled device data.    Modified Lucía:     Vitals Value Taken Time   Activity 2 06/04/25 17:45   Respiration 2 06/04/25 17:45   Circulation 2 06/04/25 17:45   Consciousness 2 06/04/25 17:45   Oxygen Saturation 2 06/04/25 17:45     Modified Lucía Score: 10

## 2025-07-18 ENCOUNTER — HOSPITAL ENCOUNTER (EMERGENCY)
Facility: HOSPITAL | Age: 70
End: 2025-07-18
Attending: EMERGENCY MEDICINE
Payer: COMMERCIAL

## 2025-07-18 ENCOUNTER — HOSPITAL ENCOUNTER (INPATIENT)
Facility: HOSPITAL | Age: 70
LOS: 2 days | Discharge: HOME | End: 2025-07-21
Attending: SURGERY | Admitting: SURGERY
Payer: COMMERCIAL

## 2025-07-18 ENCOUNTER — APPOINTMENT (EMERGENCY)
Dept: RADIOLOGY | Facility: HOSPITAL | Age: 70
End: 2025-07-18
Payer: COMMERCIAL

## 2025-07-18 ENCOUNTER — APPOINTMENT (EMERGENCY)
Dept: CT IMAGING | Facility: HOSPITAL | Age: 70
End: 2025-07-18
Payer: COMMERCIAL

## 2025-07-18 VITALS
OXYGEN SATURATION: 97 % | RESPIRATION RATE: 18 BRPM | TEMPERATURE: 97.9 F | SYSTOLIC BLOOD PRESSURE: 173 MMHG | DIASTOLIC BLOOD PRESSURE: 81 MMHG | HEART RATE: 62 BPM

## 2025-07-18 DIAGNOSIS — T14.8XXA HEMATOMA AND CONTUSION: Primary | ICD-10-CM

## 2025-07-18 DIAGNOSIS — G89.11 ACUTE PAIN DUE TO TRAUMA: ICD-10-CM

## 2025-07-18 DIAGNOSIS — I48.0 PAROXYSMAL ATRIAL FIBRILLATION (HCC): ICD-10-CM

## 2025-07-18 DIAGNOSIS — R58 ACTIVE INTERNAL BLEEDING: ICD-10-CM
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
## 2025-07-18 LAB
ALBUMIN SERPL BCG-MCNC: 4.3 G/DL (ref 3.5–5)
ALP SERPL-CCNC: 31 U/L (ref 34–104)
ALT SERPL W P-5'-P-CCNC: 14 U/L (ref 7–52)
ANION GAP SERPL CALCULATED.3IONS-SCNC: 7 MMOL/L (ref 4–13)
APTT PPP: 28 SECONDS (ref 23–34)
AST SERPL W P-5'-P-CCNC: 23 U/L (ref 13–39)
BASOPHILS # BLD AUTO: 0.08 THOUSANDS/ÂΜL (ref 0–0.1)
BASOPHILS NFR BLD AUTO: 1 % (ref 0–1)
BILIRUB SERPL-MCNC: 0.46 MG/DL (ref 0.2–1)
BUN SERPL-MCNC: 24 MG/DL (ref 5–25)
CALCIUM SERPL-MCNC: 9.4 MG/DL (ref 8.4–10.2)
CHLORIDE SERPL-SCNC: 107 MMOL/L (ref 96–108)
CO2 SERPL-SCNC: 26 MMOL/L (ref 21–32)
CREAT SERPL-MCNC: 1.15 MG/DL (ref 0.6–1.3)
EOSINOPHIL # BLD AUTO: 0.02 THOUSAND/ÂΜL (ref 0–0.61)
EOSINOPHIL NFR BLD AUTO: 0 % (ref 0–6)
ERYTHROCYTE [DISTWIDTH] IN BLOOD BY AUTOMATED COUNT: 15.5 % (ref 11.6–15.1)
GFR SERPL CREATININE-BSD FRML MDRD: 64 ML/MIN/1.73SQ M
GLUCOSE SERPL-MCNC: 94 MG/DL (ref 65–140)
HCT VFR BLD AUTO: 35.3 % (ref 36.5–49.3)
HGB BLD-MCNC: 11.5 G/DL (ref 12–17)
IMM GRANULOCYTES # BLD AUTO: 0.05 THOUSAND/UL (ref 0–0.2)
IMM GRANULOCYTES NFR BLD AUTO: 1 % (ref 0–2)
INR PPP: 2.15 (ref 0.85–1.19)
LYMPHOCYTES # BLD AUTO: 1.2 THOUSANDS/ÂΜL (ref 0.6–4.47)
LYMPHOCYTES NFR BLD AUTO: 11 % (ref 14–44)
MCH RBC QN AUTO: 27.9 PG (ref 26.8–34.3)
MCHC RBC AUTO-ENTMCNC: 32.6 G/DL (ref 31.4–37.4)
MCV RBC AUTO: 86 FL (ref 82–98)
MONOCYTES # BLD AUTO: 0.58 THOUSAND/ÂΜL (ref 0.17–1.22)
MONOCYTES NFR BLD AUTO: 6 % (ref 4–12)
NEUTROPHILS # BLD AUTO: 8.66 THOUSANDS/ÂΜL (ref 1.85–7.62)
NEUTS SEG NFR BLD AUTO: 81 % (ref 43–75)
NRBC BLD AUTO-RTO: 0 /100 WBCS
PLATELET # BLD AUTO: 295 THOUSANDS/UL (ref 149–390)
PMV BLD AUTO: 10.2 FL (ref 8.9–12.7)
POTASSIUM SERPL-SCNC: 4 MMOL/L (ref 3.5–5.3)
PROT SERPL-MCNC: 6.6 G/DL (ref 6.4–8.4)
PROTHROMBIN TIME: 24.7 SECONDS (ref 12.3–15)
RBC # BLD AUTO: 4.12 MILLION/UL (ref 3.88–5.62)
SODIUM SERPL-SCNC: 140 MMOL/L (ref 135–147)
WBC # BLD AUTO: 10.59 THOUSAND/UL (ref 4.31–10.16)

## 2025-07-18 PROCEDURE — 96376 TX/PRO/DX INJ SAME DRUG ADON: CPT

## 2025-07-18 PROCEDURE — 99285 EMERGENCY DEPT VISIT HI MDM: CPT

## 2025-07-18 PROCEDURE — 99285 EMERGENCY DEPT VISIT HI MDM: CPT | Performed by: EMERGENCY MEDICINE

## 2025-07-18 PROCEDURE — 73701 CT LOWER EXTREMITY W/DYE: CPT

## 2025-07-18 PROCEDURE — 80053 COMPREHEN METABOLIC PANEL: CPT | Performed by: EMERGENCY MEDICINE

## 2025-07-18 PROCEDURE — 99284 EMERGENCY DEPT VISIT MOD MDM: CPT

## 2025-07-18 PROCEDURE — 85730 THROMBOPLASTIN TIME PARTIAL: CPT | Performed by: EMERGENCY MEDICINE

## 2025-07-18 PROCEDURE — 73564 X-RAY EXAM KNEE 4 OR MORE: CPT

## 2025-07-18 PROCEDURE — 73590 X-RAY EXAM OF LOWER LEG: CPT

## 2025-07-18 PROCEDURE — 85025 COMPLETE CBC W/AUTO DIFF WBC: CPT | Performed by: EMERGENCY MEDICINE

## 2025-07-18 PROCEDURE — 73552 X-RAY EXAM OF FEMUR 2/>: CPT

## 2025-07-18 PROCEDURE — 36415 COLL VENOUS BLD VENIPUNCTURE: CPT | Performed by: EMERGENCY MEDICINE

## 2025-07-18 PROCEDURE — 85610 PROTHROMBIN TIME: CPT | Performed by: EMERGENCY MEDICINE

## 2025-07-18 PROCEDURE — 96374 THER/PROPH/DIAG INJ IV PUSH: CPT

## 2025-07-18 RX ORDER — MORPHINE SULFATE 4 MG/ML
4 INJECTION, SOLUTION INTRAMUSCULAR; INTRAVENOUS ONCE
Status: COMPLETED | OUTPATIENT
Start: 2025-07-18 | End: 2025-07-18

## 2025-07-18 RX ADMIN — MORPHINE SULFATE 4 MG: 4 INJECTION INTRAVENOUS at 20:56

## 2025-07-18 RX ADMIN — IOHEXOL 100 ML: 350 INJECTION, SOLUTION INTRAVENOUS at 18:07

## 2025-07-18 RX ADMIN — MORPHINE SULFATE 4 MG: 4 INJECTION INTRAVENOUS at 17:09

## 2025-07-18 NOTE — ED NOTES
Danny has a 21:00  with ALEXANDRA DE PAZ going to [facility] for trauma. Accepting Dr. Arroyo. For report call [phone].      Nargis Cooper RN  07/18/25 1182

## 2025-07-18 NOTE — EMTALA/ACUTE CARE TRANSFER
[facility] EMERGENCY DEPARTMENT  [address]  [place] [address]  Dept: [phone]      EMTALA TRANSFER CONSENT    NAME Danny PALENCIA 1955                              MRN [id number]    I have been informed of my rights regarding examination, treatment, and transfer   by Dr. Ema Jones MD    Benefits:  higher level of care, specialty services    Risks:  decompensation en route      Consent for Transfer:  I acknowledge that my medical condition has been evaluated and explained to me by the emergency department physician or other qualified medical person and/or my attending physician, who has recommended that I be transferred to the service of Trauma Surgery   at [facility] . The above potential benefits of such transfer, the potential risks associated with such transfer, and the probable risks of not being transferred have been explained to me, and I fully understand them.  The doctor has explained that, in my case, the benefits of transfer outweigh the risks.  I agree to be transferred.    I authorize the performance of emergency medical procedures and treatments upon me in both transit and upon arrival at the receiving facility.  Additionally, I authorize the release of any and all medical records to the receiving facility and request they be transported with me, if possible.  I understand that the safest mode of transportation during a medical emergency is an ambulance and that the Hospital advocates the use of this mode of transport. Risks of traveling to the receiving facility by car, including absence of medical control, life sustaining equipment, such as oxygen, and medical personnel has been explained to me and I fully understand them.    (JAILYN CORRECT BOX BELOW)  [  ]  I consent to the stated transfer and to be transported by ambulance/helicopter.  [  ]  I consent to the stated transfer, but refuse transportation  by ambulance and accept full responsibility for my transportation by car.  I understand the risks of non-ambulance transfers and I exonerate the Hospital and its staff from any deterioration in my condition that results from this refusal.    X___________________________________________    DATE  25  TIME________  Signature of patient or legally responsible individual signing on patient behalf           RELATIONSHIP TO PATIENT_________________________          Provider Certification    NAME Danny Johnson                                         1955                              MRN [id number]    A medical screening exam was performed on the above named patient.  Based on the examination:    Condition Necessitating Transfer There were no encounter diagnoses.    Patient Condition:  stable    Reason for Transfer:   expanding hematoma, specialty care required    Transfer Requirements: Facility     Space available and qualified personnel available for treatment as acknowledged by   Dr. Arroyo  Agreed to accept transfer and to provide appropriate medical treatment as acknowledged by Dr. Arroyo          Appropriate medical records of the examination and treatment of the patient are provided at the time of transfer   STAFF INITIAL WHEN COMPLETED _______  Transfer will be performed by qualified personnel from    and appropriate transfer equipment as required, including the use of necessary and appropriate life support measures.    Provider Certification: I have examined the patient and explained the following risks and benefits of being transferred/refusing transfer to the patient/family:         Based on these reasonable risks and benefits to the patient and/or the unborn child(ashley), and based upon the information available at the time of the patient’s examination, I certify that the medical benefits reasonably to be expected from the provision of appropriate medical treatments at another medical facility  outweigh the increasing risks, if any, to the individual’s medical condition, and in the case of labor to the unborn child, from effecting the transfer.    X____________________________________________ DATE 07/18/25        TIME_______      ORIGINAL - SEND TO MEDICAL RECORDS   COPY - SEND WITH PATIENT DURING TRANSFER

## 2025-07-18 NOTE — ED PROVIDER NOTES
ED Disposition       ED Disposition   Transfer to Another Facility-In Network    Condition   --    Date/Time   Fri Jul 18, 2025  7:12 PM    Comment   Danny Johnson should be transferred out to Replaced by [facility].               Assessment & Plan       Medical Decision Making  Patient is a 70-year-old male who presents to the emergency department after a fall.  He states that he was assisting with work on a chimney.  He reports that he fell from approximately 3 to 4 feet in height and landed on his bilateral lower extremities.  He is anticoagulated on Xarelto and is currently complaining of significant left lower extremity pain and swelling.  There is an obvious hematoma and effusion in the left distal femur concern for expanding hematoma.  Direct bruising and skin discoloration noted.  Patient states that he is unable to ambulate with significant knee and femur pain.    Amount and/or Complexity of Data Reviewed  Labs: ordered.  Radiology: ordered and independent interpretation performed.     Details: No acute fracture, + active extravasation noted w/ altered density in musculature  Discussion of management or test interpretation with external provider(s): See ED course.     Risk  Prescription drug management.  Parenteral controlled substances.  Decision regarding hospitalization.  Emergency major surgery.        ED Course as of 07/18/25 1956 Fri Jul 18, 2025   1904 Reviewed w/ Dr. Arroyo (trauma) re active extrav - recommend compression wrap, transfer for monitoring       Medications   morphine injection 4 mg (4 mg Intravenous Given 7/18/25 1709)   iohexol (OMNIPAQUE) 350 MG/ML injection (MULTI-DOSE) 100 mL (100 mL Intravenous Given 7/18/25 1807)       ED Risk Strat Scores                    No data recorded        SBIRT 22yo+      Flowsheet Row Most Recent Value   Initial Alcohol Screen: US AUDIT-C     1. How often do you have a drink containing alcohol? 0 Filed at: 07/18/2025 1540   2. How many drinks containing  alcohol do you have on a typical day you are drinking?  0 Filed at: 07/18/2025 1540   3a. Male UNDER 65: How often do you have five or more drinks on one occasion? 0 Filed at: 07/18/2025 1540   3b. FEMALE Any Age, or MALE 65+: How often do you have 4 or more drinks on one occassion? 0 Filed at: 07/18/2025 1540   Audit-C Score 0 Filed at: 07/18/2025 1540   OMAR: How many times in the past year have you...    Used an illegal drug or used a prescription medication for non-medical reasons? Never Filed at: 07/18/2025 1540                            History of Present Illness       Chief Complaint   Patient presents with    Fall     States he fell onto L thigh 3 hours ago, c/o  thigh pain.  States he takes BT no bruised to thigh noted    Leg Pain       Past Medical History[1]   Past Surgical History[2]   Family History[3]   Social History[4]   E-Cigarette/Vaping    E-Cigarette Use Never User       E-Cigarette/Vaping Substances    Nicotine No     THC No     CBD No     Flavoring No     Other No     Unknown No       I have reviewed and agree with the history as documented.     Patient is a pleasant 70 M who presents with thigh pain and swelling after a fall.       Fall  Associated symptoms: no headaches    Leg Pain      Review of Systems   Musculoskeletal:  Positive for joint swelling and myalgias.   Neurological:  Negative for headaches.           Objective       ED Triage Vitals   Temperature Pulse Blood Pressure Respirations SpO2 Patient Position - Orthostatic VS   07/18/25 1436 07/18/25 1436 07/18/25 1436 07/18/25 1436 07/18/25 1436 --   97.9 °F (36.6 °C) 64 154/68 18 100 %       Temp Source Heart Rate Source BP Location FiO2 (%) Pain Score    07/18/25 1436 07/18/25 1436 07/18/25 1436 -- 07/18/25 1709    Temporal Monitor Left arm  10 - Worst Possible Pain      Vitals      Date and Time Temp Pulse SpO2 Resp BP Pain Score FACES Pain Rating User   07/18/25 1940 -- 62 99 % -- 165/77 -- -- KW   07/18/25 1928 -- 61 99 % --  159/70 -- --    07/18/25 1709 -- -- -- -- -- 10 - Worst Possible Pain --    07/18/25 1436 97.9 °F (36.6 °C) 64 100 % 18 154/68 -- -- MR            Physical Exam  Vitals and nursing note reviewed.   Constitutional:       General: He is not in acute distress.     Appearance: Normal appearance.   HENT:      Head: Normocephalic and atraumatic.      Right Ear: External ear normal.      Left Ear: External ear normal.      Nose: Nose normal.     Cardiovascular:      Rate and Rhythm: Normal rate and regular rhythm.      Pulses: Normal pulses.   Pulmonary:      Effort: Pulmonary effort is normal. No respiratory distress.   Abdominal:      General: There is no distension.      Palpations: Abdomen is soft.      Tenderness: There is no abdominal tenderness.     Musculoskeletal:         General: Swelling, tenderness and signs of injury present.      Left upper leg: Swelling, tenderness and bony tenderness present.      Left knee: Tenderness present.      Right lower leg: No edema.      Left lower leg: No edema.     Skin:     General: Skin is warm and dry.      Findings: Bruising present.     Neurological:      General: No focal deficit present.      Mental Status: He is alert and oriented to person, place, and time. Mental status is at baseline.      Sensory: No sensory deficit.     Psychiatric:         Behavior: Behavior normal.         Results Reviewed       Procedure Component Value Units Date/Time    Comprehensive metabolic panel [[id number]]  (Abnormal) Collected: 07/18/25 1710    Lab Status: Final result Specimen: Blood from Arm, Right Updated: 07/18/25 3763     Sodium 140 mmol/L      Potassium 4.0 mmol/L      Chloride 107 mmol/L      CO2 26 mmol/L      ANION GAP 7 mmol/L      BUN 24 mg/dL      Creatinine 1.15 mg/dL      Glucose 94 mg/dL      Calcium 9.4 mg/dL      AST 23 U/L      ALT 14 U/L      Alkaline Phosphatase 31 U/L      Total Protein 6.6 g/dL      Albumin 4.3 g/dL      Total Bilirubin 0.46 mg/dL      eGFR 64  ml/min/1.73sq m     Narrative:      National Kidney Disease Foundation guidelines for Chronic Kidney Disease (CKD):     Stage 1 with normal or high GFR (GFR > 90 mL/min/1.73 square meters)    Stage 2 Mild CKD (GFR = 60-89 mL/min/1.73 square meters)    Stage 3A Moderate CKD (GFR = 45-59 mL/min/1.73 square meters)    Stage 3B Moderate CKD (GFR = 30-44 mL/min/1.73 square meters)    Stage 4 Severe CKD (GFR = 15-29 mL/min/1.73 square meters)    Stage 5 End Stage CKD (GFR <15 mL/min/1.73 square meters)  Note: GFR calculation is accurate only with a steady state creatinine    Protime-INR [[id number]]  (Abnormal) Collected: 07/18/25 1710    Lab Status: Final result Specimen: Blood from Arm, Right Updated: 07/18/25 1735     Protime 24.7 seconds      INR 2.15    Narrative:      INR Therapeutic Range    Indication                                             INR Range      Atrial Fibrillation                                               2.0-3.0  Hypercoagulable State                                    2.0.2.3  Left Ventricular Asist Device                            2.0-3.0  Mechanical Heart Valve                                  -    Aortic(with afib, MI, embolism, HF, LA enlargement,    and/or coagulopathy)                                     2.0-3.0 (2.5-3.5)     Mitral                                                             2.5-3.5  Prosthetic/Bioprosthetic Heart Valve               2.0-3.0  Venous thromboembolism (VTE: VT, PE        2.0-3.0    APTT [[id number]]  (Normal) Collected: 07/18/25 1710    Lab Status: Final result Specimen: Blood from Arm, Right Updated: 07/18/25 1735     PTT 28 seconds     CBC and differential [[id number]]  (Abnormal) Collected: 07/18/25 1710    Lab Status: Final result Specimen: Blood from Arm, Right Updated: 07/18/25 1723     WBC 10.59 Thousand/uL      RBC 4.12 Million/uL      Hemoglobin 11.5 g/dL      Hematocrit 35.3 %      MCV 86 fL      MCH 27.9 pg      MCHC 32.6 g/dL      RDW 15.5 %       MPV 10.2 fL      Platelets 295 Thousands/uL      nRBC 0 /100 WBCs      Segmented % 81 %      Immature Grans % 1 %      Lymphocytes % 11 %      Monocytes % 6 %      Eosinophils Relative 0 %      Basophils Relative 1 %      Absolute Neutrophils 8.66 Thousands/µL      Absolute Immature Grans 0.05 Thousand/uL      Absolute Lymphocytes 1.20 Thousands/µL      Absolute Monocytes 0.58 Thousand/µL      Eosinophils Absolute 0.02 Thousand/µL      Basophils Absolute 0.08 Thousands/µL             XR femur 2 views LEFT    (Results Pending)   XR tibia fibula 2 views LEFT    (Results Pending)   XR knee 4+ views left injury    (Results Pending)   XR tibia fibula 2 views RIGHT    (Results Pending)   CT lower extremity w contrast left    (Results Pending)       Procedures    ED Medication and Procedure Management   Prior to Admission Medications   Prescriptions Last Dose Informant Patient Reported? Taking?   ALPRAZolam (XANAX) 0.25 mg tablet  Self No No   Sig: Take 1 tablet (0.25 mg total) by mouth daily as needed for anxiety   Xarelto 20 MG tablet  Self No No   Sig: Take 1 tablet (20 mg total) by mouth daily with breakfast   atorvastatin (LIPITOR) 40 mg tablet  Self No No   Sig: Take 1 tablet (40 mg total) by mouth daily   diltiazem (CARDIZEM CD) 360 MG 24 hr capsule  Self No No   Sig: Take 1 capsule (360 mg total) by mouth daily   escitalopram (LEXAPRO) 10 mg tablet  Self No No   Sig: take 1 tablet by mouth once daily   fenofibrate (TRICOR) 145 mg tablet  Self No No   Sig: take 1 tablet by mouth daily   flecainide (TAMBOCOR) 100 mg tablet   No No   Sig: Take 1 tablet (100 mg total) by mouth 2 (two) times a day   glucose blood (OneTouch Ultra) test strip  Self No No   Sig: Use 1 each daily Test   metFORMIN (GLUCOPHAGE) 500 mg tablet  Self No No   Sig: take 1 tablet by mouth twice a day with meals   methylPREDNISolone 4 MG tablet therapy pack   No No   Sig: Use as directed on package   omeprazole (PriLOSEC) 40 MG capsule  Self No No    Sig: take 1 capsule by mouth once daily   tobramycin (TOBREX) 0.3 % OINT  Self No No   Sig: Administer 0.5 inches to both eyes 3 (three) times a day      Facility-Administered Medications: None     Patient's Medications   Discharge Prescriptions    No medications on file     No discharge procedures on file.  ED SEPSIS DOCUMENTATION              [1]   Past Medical History:  Diagnosis Date    Arthritis     Atrial fibrillation (HCC)     Candidiasis, cutaneous     Colon polyp     Colon, diverticulosis     CPAP (continuous positive airway pressure) dependence     Depression     Diabetes mellitus (HCC)     Diverticulosis     GERD (gastroesophageal reflux disease)     Hyperlipidemia     Hypertension     Internal hemorrhoids     Irregular heart beat     Pneumonia     Shortness of breath     Sleep apnea     uses cpap    Vertigo of central origin    [2]   Past Surgical History:  Procedure Laterality Date    CARDIAC ELECTROPHYSIOLOGY MAPPING AND ABLATION      CARDIAC ELECTROPHYSIOLOGY PROCEDURE N/A 6/4/2025    Procedure: Cardiac eps/afib ablation PFA;  Surgeon: Jostin Botello MD;  Location:  [facility] C[facility] LAB;  Service: Cardiology    COLONOSCOPY      EGD AND COLONOSCOPY      MS COLONOSCOPY FLX DX W/COLLJ SPEC WHEN PFRMD N/A 8/29/2018    Procedure: COLONOSCOPY;  Surgeon: Praveen Parrish MD;  Location: [facility];  Service: Gastroenterology    MS ESOPHAGOGASTRODUODENOSCOPY TRANSORAL DIAGNOSTIC N/A 10/10/2017    Procedure: ESOPHAGOGASTRODUODENOSCOPY (EGD);  Surgeon: Praveen Parrish MD;  Location: [facility];  Service: Gastroenterology    TONSILLECTOMY     [3]   Family History  Problem Relation Name Age of Onset    Other Mother          CABG,Hepatic Disorder    Coronary artery disease Mother      Other Father          CABG    Diabetes Father      Cancer Family          Gastrointestinal   [4]   Social History  Tobacco Use    Smoking status: Former     Current packs/day: 0.00     Average packs/day: 1 pack/day for 12.4 years  (12.4 ttl pk-yrs)     Types: Cigarettes     Start date: 1962     Quit date: 10/10/1974     Years since quittin.8     Passive exposure: Never    Smokeless tobacco: Never   Vaping Use    Vaping status: Never Used   Substance Use Topics    Alcohol use: Yes     Comment: RARE    Drug use: No        Ema Jones MD  25

## 2025-07-19 ENCOUNTER — APPOINTMENT (INPATIENT)
Dept: NON INVASIVE DIAGNOSTICS | Facility: HOSPITAL | Age: 70
End: 2025-07-19
Payer: COMMERCIAL

## 2025-07-19 LAB
GLUCOSE SERPL-MCNC: 105 MG/DL (ref 65–140)
GLUCOSE SERPL-MCNC: 110 MG/DL (ref 65–140)
GLUCOSE SERPL-MCNC: 126 MG/DL (ref 65–140)
GLUCOSE SERPL-MCNC: 98 MG/DL (ref 65–140)
HCT VFR BLD AUTO: 33.8 % (ref 36.5–49.3)
HGB BLD-MCNC: 10.7 G/DL (ref 12–17)

## 2025-07-19 PROCEDURE — 36415 COLL VENOUS BLD VENIPUNCTURE: CPT

## 2025-07-19 PROCEDURE — 93971 EXTREMITY STUDY: CPT | Performed by: SURGERY

## 2025-07-19 PROCEDURE — 85014 HEMATOCRIT: CPT

## 2025-07-19 PROCEDURE — 93971 EXTREMITY STUDY: CPT

## 2025-07-19 PROCEDURE — 99223 1ST HOSP IP/OBS HIGH 75: CPT | Performed by: SURGERY

## 2025-07-19 PROCEDURE — 82948 REAGENT STRIP/BLOOD GLUCOSE: CPT

## 2025-07-19 PROCEDURE — 85018 HEMOGLOBIN: CPT

## 2025-07-19 RX ORDER — POLYETHYLENE GLYCOL 3350 17 G/17G
17 POWDER, FOR SOLUTION ORAL DAILY
Status: DISCONTINUED | OUTPATIENT
Start: 2025-07-19 | End: 2025-07-21 | Stop reason: HOSPADM

## 2025-07-19 RX ORDER — ATORVASTATIN CALCIUM 40 MG/1
40 TABLET, FILM COATED ORAL DAILY
Status: DISCONTINUED | OUTPATIENT
Start: 2025-07-19 | End: 2025-07-21 | Stop reason: HOSPADM

## 2025-07-19 RX ORDER — ESCITALOPRAM OXALATE 10 MG/1
10 TABLET ORAL DAILY
Status: DISCONTINUED | OUTPATIENT
Start: 2025-07-19 | End: 2025-07-21 | Stop reason: HOSPADM

## 2025-07-19 RX ORDER — HYDROMORPHONE HCL IN WATER/PF 6 MG/30 ML
0.2 PATIENT CONTROLLED ANALGESIA SYRINGE INTRAVENOUS EVERY 2 HOUR PRN
Refills: 0 | Status: DISCONTINUED | OUTPATIENT
Start: 2025-07-19 | End: 2025-07-21 | Stop reason: HOSPADM

## 2025-07-19 RX ORDER — LIDOCAINE 50 MG/G
1 PATCH TOPICAL DAILY
Status: DISCONTINUED | OUTPATIENT
Start: 2025-07-19 | End: 2025-07-21 | Stop reason: HOSPADM

## 2025-07-19 RX ORDER — PANTOPRAZOLE SODIUM 40 MG/1
40 TABLET, DELAYED RELEASE ORAL
Status: DISCONTINUED | OUTPATIENT
Start: 2025-07-19 | End: 2025-07-21 | Stop reason: HOSPADM

## 2025-07-19 RX ORDER — FLECAINIDE ACETATE 100 MG/1
100 TABLET ORAL 2 TIMES DAILY
Status: DISCONTINUED | OUTPATIENT
Start: 2025-07-19 | End: 2025-07-21 | Stop reason: HOSPADM

## 2025-07-19 RX ORDER — AMOXICILLIN 250 MG
1 CAPSULE ORAL
Status: DISCONTINUED | OUTPATIENT
Start: 2025-07-19 | End: 2025-07-21 | Stop reason: HOSPADM

## 2025-07-19 RX ORDER — ACETAMINOPHEN 325 MG/1
975 TABLET ORAL EVERY 8 HOURS SCHEDULED
Status: DISCONTINUED | OUTPATIENT
Start: 2025-07-19 | End: 2025-07-21 | Stop reason: HOSPADM

## 2025-07-19 RX ORDER — OXYCODONE HYDROCHLORIDE 5 MG/1
5 TABLET ORAL EVERY 4 HOURS PRN
Refills: 0 | Status: DISCONTINUED | OUTPATIENT
Start: 2025-07-19 | End: 2025-07-21 | Stop reason: HOSPADM

## 2025-07-19 RX ORDER — DILTIAZEM HCL 60 MG
120 TABLET ORAL EVERY 12 HOURS
Status: DISCONTINUED | OUTPATIENT
Start: 2025-07-19 | End: 2025-07-21 | Stop reason: HOSPADM

## 2025-07-19 RX ORDER — INSULIN LISPRO 100 [IU]/ML
2-12 INJECTION, SOLUTION INTRAVENOUS; SUBCUTANEOUS
Status: DISCONTINUED | OUTPATIENT
Start: 2025-07-19 | End: 2025-07-21 | Stop reason: HOSPADM

## 2025-07-19 RX ORDER — FENOFIBRATE 145 MG/1
145 TABLET, FILM COATED ORAL DAILY
Status: DISCONTINUED | OUTPATIENT
Start: 2025-07-19 | End: 2025-07-21 | Stop reason: HOSPADM

## 2025-07-19 RX ADMIN — ACETAMINOPHEN 975 MG: 325 TABLET ORAL at 22:09

## 2025-07-19 RX ADMIN — FLECAINIDE ACETATE 100 MG: 100 TABLET ORAL at 16:27

## 2025-07-19 RX ADMIN — DILTIAZEM HYDROCHLORIDE 120 MG: 60 TABLET ORAL at 16:23

## 2025-07-19 RX ADMIN — DILTIAZEM HYDROCHLORIDE 120 MG: 60 TABLET ORAL at 05:46

## 2025-07-19 RX ADMIN — ACETAMINOPHEN 975 MG: 325 TABLET ORAL at 13:32

## 2025-07-19 RX ADMIN — LIDOCAINE 1 PATCH: 50 PATCH CUTANEOUS at 08:36

## 2025-07-19 RX ADMIN — ESCITALOPRAM OXALATE 10 MG: 10 TABLET ORAL at 08:35

## 2025-07-19 RX ADMIN — OXYCODONE HYDROCHLORIDE 5 MG: 5 TABLET ORAL at 05:50

## 2025-07-19 RX ADMIN — PANTOPRAZOLE SODIUM 40 MG: 40 TABLET, DELAYED RELEASE ORAL at 05:46

## 2025-07-19 RX ADMIN — DOCUSATE SODIUM AND SENNOSIDES 1 TABLET: 8.6; 5 TABLET ORAL at 22:09

## 2025-07-19 RX ADMIN — FENOFIBRATE 145 MG: 145 TABLET ORAL at 08:35

## 2025-07-19 RX ADMIN — ATORVASTATIN CALCIUM 40 MG: 40 TABLET, FILM COATED ORAL at 08:35

## 2025-07-19 NOTE — H&P
"H&P - Trauma   Name: Danny Johnson 70 y.o. male I MRN: [id number]  Unit/Bed#: ED 23 I Date of Admission: 7/18/2025   Date of Service: 7/19/2025 I Hospital Day: 0     Assessment & Plan  Active internal bleeding  Multimodal pain control  Monitor L anterior compartment of L thigh for compartment syndrome.  Fall, initial encounter  Gerentology consulted    Trauma Alert: Other Transfer [facility]   Model of Arrival: Ambulance    Trauma Team: Attending Dina and Residents angel Lindsay  Consultants:     None     History of Present Illness   Chief Complaint: L thigh pain  Mechanism:Fall     Danny Johnson is a 70 y.o. male who presents after a fall. Patient says that he was working on taking down a chimney. When he was about 4 feet from the ground, he lost control of the machine and fell to the ground onto a wheelbarrow's handle. He says he caught himself with his L hand as he was going down and then fell slowly after that. \"And then everything rolled over. \" Denies hitting his head, denies loss of consciousness. He was able to stand right away and started working for about 20 minutes before he noticed swelling in his L thigh.   Patient is on xarelto for his afib.     Review of Systems   Constitutional:  Negative for chills and fever.   HENT:  Negative for ear pain and sore throat.    Eyes:  Negative for pain and visual disturbance.   Respiratory:  Negative for cough and shortness of breath.    Cardiovascular:  Negative for chest pain and palpitations.   Gastrointestinal:  Negative for abdominal pain and vomiting.   Genitourinary:  Negative for dysuria and hematuria.   Musculoskeletal:  Negative for arthralgias and back pain.   Skin:  Negative for color change and rash.   Neurological:  Negative for dizziness, seizures, syncope, light-headedness and headaches.   All other systems reviewed and are negative.    Medical History Review: I have reviewed the patient's PMH, PSH, Social History, Family History, Meds, " and Allergies   Immunization History   Administered Date(s) Administered    COVID-19 MODERNA VACC 0.5 ML IM 03/16/2021, 04/13/2021, 11/03/2021, 05/09/2022    COVID-19 Moderna mRNA Vaccine 12 Yr+ 50 mcg/0.5 mL (Spikevax) 10/12/2023, 09/16/2024    COVID-19 Pfizer Vac BIVALENT Shay-sucrose 12 Yr+ IM 10/09/2022    INFLUENZA 09/25/2014, 08/16/2016, 08/17/2017, 08/14/2018, 09/03/2020, 08/31/2021, 08/30/2022, 09/03/2023    Influenza Injectable, MDCK, Preservative Free, Quadrivalent, 0.5 mL 08/30/2019    Influenza, injectable, quadrivalent, preservative free 0.5 mL 08/14/2018, 09/03/2020    Influenza, seasonal, injectable 10/10/2013, 09/01/2016    Influenza, seasonal, injectable, preservative free 09/16/2015    Pneumococcal Conjugate 13-Valent 05/06/2016    Pneumococcal Conjugate Vaccine 20-valent (Pcv20), Polysace 11/07/2022, 04/06/2024    Pneumococcal Polysaccharide PPV23 1955, 08/17/2017    Respiratory Syncytial Virus Vaccine (Recombinant, Adjuvanted) 04/06/2024    Tdap 1955, 05/06/2016    Zoster 1955, 05/06/2016    Zoster Vaccine Recombinant 09/12/2019, 02/12/2020    influenza, trivalent, adjuvanted 09/16/2024     Last Tetanus: 2016      ISAR Score: Did you order a geriatric consult if the score was 2 or greater?: yes (ISAR) Identification of Seniors at Risk  Before the illness or injury that brought you to the Emergency, did you need someone to help you on a regular basis?: 0  In the last 24 hours, have you needed more help than usual?: 1  Have you been hospitalized for one or more nights during the past 6 months?: 1  In general, do you see well?: 0  In general, do you have serious problems with your memory?: 0  Do you take more than three different medications every day?: 1  ISAR Score: 3           Objective :  Temp:  [97.9 °F (36.6 °C)] 97.9 °F (36.6 °C)  HR:  [54-64] 62  BP: (139-178)/(67-81) 178/68  Resp:  [16-24] 24  SpO2:  [93 %-100 %] 96 %  O2 Device: None (Room air)    Initial Vitals:   Pulse:  61 (07/18/25 2210)  Respirations: 16 (07/18/25 2210)  Blood Pressure: (!) 178/77 (07/18/25 2210)    Primary Survey:   Airway:        Status: patent;        Pre-hospital Interventions: none        Hospital Interventions: none  Breathing:        Pre-hospital Interventions: none       Effort: normal       Right breath sounds: normal       Left breath sounds: normal  Circulation:        Rhythm: regular no murmur       Rate: regular   Right Pulses Left Pulses    R radial: 2+    R pedal: 2+     L radial: 2+    L pedal: 2+       Disability:        GCS: Eye: 4; Verbal: 5 Motor: 6 Total: 15       Right Pupil: round;  reactive         Left Pupil:  round;  reactive      R Motor Strength L Motor Strength    R : 5/5  R dorsiflex: 5/5  R plantarflex: 5/5 L : 5/5  L dorsiflex: 5/5  L plantarflex: 5/5        Sensory:  No sensory deficit  Exposure:           Secondary Survey:  Physical Exam  Constitutional:       General: He is not in acute distress.    Eyes:      General: No scleral icterus.        Right eye: No discharge.         Left eye: No discharge.      Pupils: Pupils are equal, round, and reactive to light.       Cardiovascular:      Rate and Rhythm: Normal rate and regular rhythm.      Pulses: Normal pulses.   Pulmonary:      Effort: Pulmonary effort is normal. No respiratory distress.      Breath sounds: Normal breath sounds. No stridor. No wheezing or rhonchi.   Abdominal:      General: Abdomen is flat.      Tenderness: There is no abdominal tenderness.      Comments: Umbilical hernia  Mild ecchymosis in suprapubic region     Musculoskeletal:         General: Swelling and tenderness present.      Right lower leg: No edema.      Left lower leg: No edema.      Comments: L thigh swelling over IT band region. Firm to touch, tender. No erythema, no ecchymosis.      Skin:     General: Skin is warm.      Coloration: Skin is not jaundiced.      Findings: Bruising present. No erythema.     Neurological:      General: No  focal deficit present.      Mental Status: He is alert and oriented to person, place, and time.      Cranial Nerves: No cranial nerve deficit.     Psychiatric:         Mood and Affect: Mood normal.             Lab Results: I have reviewed the following results:  Recent Labs     07/18/25  1710   WBC 10.59*   HGB 11.5*   HCT 35.3*      SODIUM 140   K 4.0      CO2 26   BUN 24   CREATININE 1.15   GLUC 94   AST 23   ALT 14   ALB 4.3   TBILI 0.46   ALKPHOS 31*   PTT 28   INR 2.15*       Imaging Results: I have personally reviewed pertinent images saved in PACS. CT scan findings (and other pertinent positive findings on images) were discussed with radiology. My interpretation of the images/reports are as follows:  Chest Xray(s): negative for acute findings   FAST exam(s): negative for acute findings   CT Scan(s): positive for acute findings: Active extravasation in L thigh wtith. 9.8 x 5.4 x 20 cm collection   Additional Xray(s): negative for acute findings     Other Studies:

## 2025-07-19 NOTE — CASE MANAGEMENT
Case Management Discharge Planning Note    Patient name Danny Johnson  Location [facility] 820/[facility] 820-01 MRN [id number]  : 1955 Date 2025       Current Admission Date: 2025  Current Admission Diagnosis:Fall, initial encounter, Unspecified multiple injuries, initial encounter, Active internal bleeding   Patient Active Problem List    Diagnosis Date Noted    CPAP (continuous positive airway pressure) dependence 2025    S/P afib ablation 2025    Chronic left shoulder pain 2025    LUKE (obstructive sleep apnea) 2025    Achilles tendinitis of right lower extremity 2024    Skin lesion of cheek 11/10/2023    Mild episode of recurrent major depressive disorder (HCC) 2022    Rhinitis medicamentosa 2021    Ocular migraine 2021    Gastroesophageal reflux disease 2020    Chronic vertigo 2020    Class 1 obesity due to excess calories with serious comorbidity and body mass index (BMI) of 32.0 to 32.9 in adult 2020    Diastasis recti 2020    Chronic anticoagulation 2020    Atrial fibrillation (HCC) 2020    Mixed hyperlipidemia 2020    Anxiety 2017    Controlled type 2 diabetes mellitus (HCC) 2017    Benign essential hypertension 2014      LOS (days): 0  Geometric Mean LOS (GMLOS) (days):   Days to GMLOS:     OBJECTIVE:  Risk of Unplanned Readmission Score: 12.3         Current admission status: Inpatient   Preferred Pharmacy:   [facility]/pharmacy #[id number] - [address] - [address]  [address]  [facility] [address]  Phone: [phone] Fax: [phone]    Primary Care Provider: Sandra Rosenthal MD    Primary Insurance: [facility]  Secondary Insurance:     DISCHARGE DETAILS:     CM went to bedside for open assessment.  Nobody was in the room. CM will follow up when pt is available.

## 2025-07-19 NOTE — PLAN OF CARE
Problem: PAIN - ADULT  Goal: Verbalizes/displays adequate comfort level or baseline comfort level  Description: Interventions:  - Encourage patient to monitor pain and request assistance  - Assess pain using appropriate pain scale  - Administer analgesics as ordered based on type and severity of pain and evaluate response  - Implement non-pharmacological measures as appropriate and evaluate response  - Consider cultural and social influences on pain and pain management  - Notify physician/advanced practitioner if interventions unsuccessful or patient reports new pain  - Educate patient/family on pain management process including their role and importance of  reporting pain   - Provide non-pharmacologic/complimentary pain relief interventions  Outcome: Progressing     Problem: INFECTION - ADULT  Goal: Absence or prevention of progression during hospitalization  Description: INTERVENTIONS:  - Assess and monitor for signs and symptoms of infection  - Monitor lab/diagnostic results  - Monitor all insertion sites, i.e. indwelling lines, tubes, and drains  - Monitor endotracheal if appropriate and nasal secretions for changes in amount and color  - [place] appropriate cooling/warming therapies per order  - Administer medications as ordered  - Instruct and encourage patient and family to use good hand hygiene technique  - Identify and instruct in appropriate isolation precautions for identified infection/condition  Outcome: Progressing  Goal: Absence of fever/infection during neutropenic period  Description: INTERVENTIONS:  - Monitor WBC  - Perform strict hand hygiene  - Limit to healthy visitors only  - No plants, dried, fresh or silk flowers with daly in patient room  Outcome: Progressing
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
## 2025-07-20 PROBLEM — S70.12XA HEMATOMA OF LEFT THIGH: Status: ACTIVE | Noted: 2025-07-20

## 2025-07-20 LAB
ANION GAP SERPL CALCULATED.3IONS-SCNC: 6 MMOL/L (ref 4–13)
BASOPHILS # BLD AUTO: 0.05 THOUSANDS/ÂΜL (ref 0–0.1)
BASOPHILS NFR BLD AUTO: 1 % (ref 0–1)
BUN SERPL-MCNC: 18 MG/DL (ref 5–25)
CALCIUM SERPL-MCNC: 8.8 MG/DL (ref 8.4–10.2)
CHLORIDE SERPL-SCNC: 107 MMOL/L (ref 96–108)
CO2 SERPL-SCNC: 28 MMOL/L (ref 21–32)
CREAT SERPL-MCNC: 1.21 MG/DL (ref 0.6–1.3)
EOSINOPHIL # BLD AUTO: 0.34 THOUSAND/ÂΜL (ref 0–0.61)
EOSINOPHIL NFR BLD AUTO: 5 % (ref 0–6)
ERYTHROCYTE [DISTWIDTH] IN BLOOD BY AUTOMATED COUNT: 15.6 % (ref 11.6–15.1)
GFR SERPL CREATININE-BSD FRML MDRD: 60 ML/MIN/1.73SQ M
GLUCOSE SERPL-MCNC: 104 MG/DL (ref 65–140)
GLUCOSE SERPL-MCNC: 108 MG/DL (ref 65–140)
GLUCOSE SERPL-MCNC: 112 MG/DL (ref 65–140)
GLUCOSE SERPL-MCNC: 91 MG/DL (ref 65–140)
HCT VFR BLD AUTO: 33.2 % (ref 36.5–49.3)
HCT VFR BLD AUTO: 35.2 % (ref 36.5–49.3)
HGB BLD-MCNC: 10.4 G/DL (ref 12–17)
HGB BLD-MCNC: 10.9 G/DL (ref 12–17)
IMM GRANULOCYTES # BLD AUTO: 0.01 THOUSAND/UL (ref 0–0.2)
IMM GRANULOCYTES NFR BLD AUTO: 0 % (ref 0–2)
LYMPHOCYTES # BLD AUTO: 1.89 THOUSANDS/ÂΜL (ref 0.6–4.47)
LYMPHOCYTES NFR BLD AUTO: 30 % (ref 14–44)
MCH RBC QN AUTO: 27.5 PG (ref 26.8–34.3)
MCHC RBC AUTO-ENTMCNC: 31.3 G/DL (ref 31.4–37.4)
MCV RBC AUTO: 88 FL (ref 82–98)
MONOCYTES # BLD AUTO: 0.64 THOUSAND/ÂΜL (ref 0.17–1.22)
MONOCYTES NFR BLD AUTO: 10 % (ref 4–12)
NEUTROPHILS # BLD AUTO: 3.34 THOUSANDS/ÂΜL (ref 1.85–7.62)
NEUTS SEG NFR BLD AUTO: 54 % (ref 43–75)
NRBC BLD AUTO-RTO: 0 /100 WBCS
PLATELET # BLD AUTO: 246 THOUSANDS/UL (ref 149–390)
PMV BLD AUTO: 10.1 FL (ref 8.9–12.7)
POTASSIUM SERPL-SCNC: 4.2 MMOL/L (ref 3.5–5.3)
RBC # BLD AUTO: 3.78 MILLION/UL (ref 3.88–5.62)
SODIUM SERPL-SCNC: 141 MMOL/L (ref 135–147)
WBC # BLD AUTO: 6.27 THOUSAND/UL (ref 4.31–10.16)

## 2025-07-20 PROCEDURE — 85014 HEMATOCRIT: CPT

## 2025-07-20 PROCEDURE — 85025 COMPLETE CBC W/AUTO DIFF WBC: CPT

## 2025-07-20 PROCEDURE — 82948 REAGENT STRIP/BLOOD GLUCOSE: CPT

## 2025-07-20 PROCEDURE — 80048 BASIC METABOLIC PNL TOTAL CA: CPT

## 2025-07-20 PROCEDURE — 97166 OT EVAL MOD COMPLEX 45 MIN: CPT

## 2025-07-20 PROCEDURE — 85018 HEMOGLOBIN: CPT

## 2025-07-20 PROCEDURE — 99232 SBSQ HOSP IP/OBS MODERATE 35: CPT | Performed by: SURGERY

## 2025-07-20 PROCEDURE — 97163 PT EVAL HIGH COMPLEX 45 MIN: CPT

## 2025-07-20 RX ADMIN — ATORVASTATIN CALCIUM 40 MG: 40 TABLET, FILM COATED ORAL at 09:17

## 2025-07-20 RX ADMIN — ACETAMINOPHEN 975 MG: 325 TABLET ORAL at 05:06

## 2025-07-20 RX ADMIN — FLECAINIDE ACETATE 100 MG: 100 TABLET ORAL at 09:18

## 2025-07-20 RX ADMIN — DILTIAZEM HYDROCHLORIDE 120 MG: 60 TABLET ORAL at 17:03

## 2025-07-20 RX ADMIN — ESCITALOPRAM OXALATE 10 MG: 10 TABLET ORAL at 09:17

## 2025-07-20 RX ADMIN — LIDOCAINE 1 PATCH: 50 PATCH CUTANEOUS at 09:17

## 2025-07-20 RX ADMIN — FLECAINIDE ACETATE 100 MG: 100 TABLET ORAL at 21:19

## 2025-07-20 RX ADMIN — PANTOPRAZOLE SODIUM 40 MG: 40 TABLET, DELAYED RELEASE ORAL at 05:07

## 2025-07-20 RX ADMIN — DOCUSATE SODIUM AND SENNOSIDES 1 TABLET: 8.6; 5 TABLET ORAL at 21:19

## 2025-07-20 RX ADMIN — FENOFIBRATE 145 MG: 145 TABLET ORAL at 09:17

## 2025-07-20 RX ADMIN — ACETAMINOPHEN 975 MG: 325 TABLET ORAL at 21:18

## 2025-07-20 RX ADMIN — DILTIAZEM HYDROCHLORIDE 120 MG: 60 TABLET ORAL at 06:10

## 2025-07-20 RX ADMIN — Medication 2.5 MG: at 14:12

## 2025-07-20 RX ADMIN — Medication 2.5 MG: at 10:01

## 2025-07-20 RX ADMIN — ACETAMINOPHEN 975 MG: 325 TABLET ORAL at 14:09

## 2025-07-20 NOTE — PROGRESS NOTES
"Progress Note - Trauma   Name: Danny Johnson 70 y.o. male I MRN: [id number]  Unit/Bed#: [facility] 820-01 I Date of Admission: 7/18/2025   Date of Service: 7/20/2025 I Hospital Day: 1    Assessment & Plan  Fall  - Status post fall with the below noted injuries.  - Fall precautions.  - Geriatric Medicine consultation for evaluation, medication review and recommendations.  - PT and OT evaluation and treatment as indicated.  - Case Management consultation for disposition planning.    Hematoma of left thigh  - CT LLE w contrast 7/18: \"9.8 x 5.4 x 20 cm heterogeneous collection within the vastus lateralis muscle compatible with intramuscular hematoma. There is a curvilinear hyperdense area within this collection in keeping with active extravasation.\"  - Hemoglobin 11.5 at time of admission; currently 10.4  - If hemoglobin stable on repeat this evening, can start DVT Ppx or resume Xarelto   A-fib ([facility])  - Currently holding home Xarelto   - Continue home flecainide and cardizem  T2DM (type 2 diabetes mellitus) ([facility])  Lab Results   Component Value Date    HGBA1C 6.5 (H) 05/07/2025       Recent Labs     07/19/25  1116 07/19/25  1618 07/19/25  2216 07/20/25  0644   POCGLU 105 98 110 108       Blood Sugar Average: Last 72 hrs:  (P) 109.4    - Holding home metformin  - SSI insulin ordered     Bowel Regimen: Miralax  VTE Prophylaxis:Reason for no pharmacologic prophylaxis Hematoma with active extrav trending hemoglobin     Disposition: Pending resuming Xarelto and PT/OT evaluation I have discussed the above management plan in detail with the primary service.     24 Hour Events : No acute events overnight   Subjective : Patient feels well. Has some pain to LLE but overall says the leg feels softer than it did before. No current complaints.     Objective :  Temp:  [97.6 °F (36.4 °C)-98.4 °F (36.9 °C)] 97.7 °F (36.5 °C)  HR:  [56-63] 59  BP: (124-162)/(63-73) 124/64  Resp:  [16-18] 16  SpO2:  [94 %-99 %] 96 %  O2 Device: None (Room " air)    I/O         07/18 0701 07/19 0700 07/19 0701 07/20 0700 07/20 0701 07/21 0700    P.O.  118 240    Total Intake(mL/kg)  118 (1.3) 240 (2.6)    Net  +118 +240           Unmeasured Urine Occurrence   2 x            Physical Exam  Constitutional:       General: He is not in acute distress.     Appearance: Normal appearance. He is not ill-appearing or diaphoretic.   HENT:      Head: Normocephalic and atraumatic.      Nose: Nose normal.     Eyes:      Pupils: Pupils are equal, round, and reactive to light.       Cardiovascular:      Rate and Rhythm: Normal rate.   Pulmonary:      Effort: Pulmonary effort is normal. No respiratory distress.      Breath sounds: No wheezing.   Abdominal:      General: Abdomen is flat. There is no distension.      Palpations: Abdomen is soft.      Tenderness: There is no abdominal tenderness. There is no guarding.     Musculoskeletal:      Right lower leg: No edema.      Left lower leg: No edema.      Comments: L thigh compartments soft, minimally palpable hematoma L lateral thigh with no overlying skin changes. Ace wrapping replaced after exam      Skin:     General: Skin is warm and dry.      Capillary Refill: Capillary refill takes less than 2 seconds.     Neurological:      General: No focal deficit present.      Mental Status: He is alert and oriented to person, place, and time.               Lab Results: I have reviewed the following results:  Recent Labs     07/18/25  1710 07/19/25  1327 07/20/25  0500   WBC 10.59*  --  6.27   HGB 11.5*   < > 10.4*   HCT 35.3*   < > 33.2*     --  246   SODIUM 140  --  141   K 4.0  --  4.2     --  107   CO2 26  --  28   BUN 24  --  18   CREATININE 1.15  --  1.21   GLUC 94  --  104   AST 23  --   --    ALT 14  --   --    ALB 4.3  --   --    TBILI 0.46  --   --    ALKPHOS 31*  --   --    PTT 28  --   --    INR 2.15*  --   --     < > = values in this interval not displayed.       Imaging Results Review: No pertinent imaging studies  reviewed.  Other Study Results Review: No additional pertinent studies reviewed.

## 2025-07-20 NOTE — PLAN OF CARE
Problem: PAIN - ADULT  Goal: Verbalizes/displays adequate comfort level or baseline comfort level  Description: Interventions:  - Encourage patient to monitor pain and request assistance  - Assess pain using appropriate pain scale  - Administer analgesics as ordered based on type and severity of pain and evaluate response  - Implement non-pharmacological measures as appropriate and evaluate response  - Consider cultural and social influences on pain and pain management  - Notify physician/advanced practitioner if interventions unsuccessful or patient reports new pain  - Educate patient/family on pain management process including their role and importance of  reporting pain   - Provide non-pharmacologic/complimentary pain relief interventions  Outcome: Progressing     Problem: INFECTION - ADULT  Goal: Absence or prevention of progression during hospitalization  Description: INTERVENTIONS:  - Assess and monitor for signs and symptoms of infection  - Monitor lab/diagnostic results  - Monitor all insertion sites, i.e. indwelling lines, tubes, and drains  - Monitor endotracheal if appropriate and nasal secretions for changes in amount and color  - [place] appropriate cooling/warming therapies per order  - Administer medications as ordered  - Instruct and encourage patient and family to use good hand hygiene technique  - Identify and instruct in appropriate isolation precautions for identified infection/condition  Outcome: Progressing  Goal: Absence of fever/infection during neutropenic period  Description: INTERVENTIONS:  - Monitor WBC  - Perform strict hand hygiene  - Limit to healthy visitors only  - No plants, dried, fresh or silk flowers with daly in patient room  Outcome: Progressing     Problem: SAFETY ADULT  Goal: Patient will remain free of falls  Description: INTERVENTIONS:  - Educate patient/family on patient safety including physical limitations  - Instruct patient to call for assistance with activity   -  Consider consulting OT/PT to assist with strengthening/mobility based on AM PAC & JH-HLM score  - Consult OT/PT to assist with strengthening/mobility   - Keep Call bell within reach  - Keep bed low and locked with side rails adjusted as appropriate  - Keep care items and personal belongings within reach  - Initiate and maintain comfort rounds  - Make Fall Risk Sign visible to staff  - Offer Toileting every  Hours, in advance of need  - Initiate/Maintain alarm  - Obtain necessary fall risk management equipment:   - Apply yellow socks and bracelet for high fall risk patients  - Consider moving patient to room near nurses station  Outcome: Progressing  Goal: Maintain or return to baseline ADL function  Description: INTERVENTIONS:  -  Assess patient's ability to carry out ADLs; assess patient's baseline for ADL function and identify physical deficits which impact ability to perform ADLs (bathing, care of mouth/teeth, toileting, grooming, dressing, etc.)  - Assess/evaluate cause of self-care deficits   - Assess range of motion  - Assess patient's mobility; develop plan if impaired  - Assess patient's need for assistive devices and provide as appropriate  - Encourage maximum independence but intervene and supervise when necessary  - Involve family in performance of ADLs  - Assess for home care needs following discharge   - Consider OT consult to assist with ADL evaluation and planning for discharge  - Provide patient education as appropriate  - Monitor functional capacity and physical performance, use of AM PAC & JH-HLM   - Monitor gait, balance and fatigue with ambulation    Outcome: Progressing  Goal: Maintains/Returns to pre admission functional level  Description: INTERVENTIONS:  - Perform AM-PAC 6 Click Basic Mobility/ Daily Activity assessment daily.  - Set and communicate daily mobility goal to care team and patient/family/caregiver.   - Collaborate with rehabilitation services on mobility goals if consulted  -  Perform Range of Motion  times a day.  - Reposition patient every  hours.  - Dangle patient  times a day  - Stand patient  times a day  - Ambulate patient  times a day  - Out of bed to chair  times a day   - Out of bed for meals  times a day  - Out of bed for toileting  - Record patient progress and toleration of activity level   Outcome: Progressing     Problem: DISCHARGE PLANNING  Goal: Discharge to home or other facility with appropriate resources  Description: INTERVENTIONS:  - Identify barriers to discharge w/patient and caregiver  - Arrange for needed discharge resources and transportation as appropriate  - Identify discharge learning needs (meds, wound care, etc.)  - Arrange for interpretive services to assist at discharge as needed  - Refer to Case Management Department for coordinating discharge planning if the patient needs post-hospital services based on physician/advanced practitioner order or complex needs related to functional status, cognitive ability, or social support system  Outcome: Progressing     Problem: Knowledge Deficit  Goal: Patient/family/caregiver demonstrates understanding of disease process, treatment plan, medications, and discharge instructions  Description: Complete learning assessment and assess knowledge base.  Interventions:  - Provide teaching at level of understanding  - Provide teaching via preferred learning methods  Outcome: Progressing

## 2025-07-20 NOTE — PHYSICAL THERAPY NOTE
PHYSICAL THERAPY EVALUATION          Patient Name: Danny Johnson  Today's Date: 7/20/2025 07/20/25 1012   Note Type   Note type Evaluation   Pain Assessment   Pain Assessment Tool 0-10   Pain Score 5   Pain Location/Orientation Orientation: Left;Location: Leg   Hospital Pain Intervention(s) Repositioned;Ambulation/increased activity;Emotional support;Relaxation technique   Restrictions/Precautions   Weight Bearing Precautions Per Order No   Braces or Orthoses   (ACE wrap)   Other Precautions Chair Alarm;Bed Alarm;Pain;Hard of hearing   Home Living   Type of Home House   Home Layout One level;Performs ADLs on one level;Able to live on main level with bedroom/bathroom;Stairs to enter with rails  (10 NATHEN; stair glide)   Bathroom Shower/Tub Walk-in shower   Bathroom Toilet Standard   Bathroom Equipment Shower chair   Home Equipment Walker;Wheelchair-manual;Cane   Additional Comments pt reports living with his wife in a raised ranch home. there are 10 NATHEN however patient uses a stair glide from the garage into the house. No AD PTA.   Prior Function   Level of [place] Independent with ADLs;Independent with functional mobility;Independent with IADLS   Lives With Spouse   Receives Help From Family   IADLs Independent with driving;Independent with meal prep;Independent with medication management   Falls in the last 6 months 1 to 4   Vocational Retired   General   Family/Caregiver Present No   Cognition   Overall Cognitive Status WFL   Arousal/Participation Alert   Orientation Level Oriented X4   Memory Within functional limits   Following Commands Follows all commands and directions without difficulty   RLE Assessment   RLE Assessment WFL   LLE Assessment   LLE Assessment WFL   Bed Mobility   Supine to Sit 5  Supervision   Transfers   Sit to Stand 5  Supervision   Stand to Sit 5  Supervision   Additional items Verbal cues    Ambulation/Elevation   Gait pattern Decreased L stance;Foward flexed;Short stride   Gait Assistance 5  Supervision   Assistive Device Rolling walker   Distance 200'   Balance   Static Sitting Good   Dynamic Sitting Fair +   Static Standing Fair   Dynamic Standing Fair -   Ambulatory Fair -   Activity Tolerance   Activity Tolerance Patient tolerated treatment well   Medical Staff Made Aware SAVANNAH Medrano; LUCIUS Lozada   Nurse Made Aware pt appropriate to be seen and mobilize per nsg   Assessment   Prognosis Good   Problem List Decreased endurance;Impaired balance;Impaired hearing;Pain   Assessment Pt is a 70 y.o. male s/p admit to [facility] on 07/18/2025 after a fall. Two patient identifiers were used to confirm. Patient with primary dx of hematoma of left thigh and other active problems including A-fib and T2DM. Patients current co morbidities affecting tx include  has a past medical history of Arthritis, Atrial fibrillation (HCC), Candidiasis, cutaneous, Colon polyp, Colon, diverticulosis, CPAP (continuous positive airway pressure) dependence, Depression, Diabetes mellitus (HCC), Diverticulosis, GERD (gastroesophageal reflux disease), Hyperlipidemia, Hypertension, Internal hemorrhoids, Irregular heart beat, Pneumonia, Shortness of breath, Sleep apnea, and Vertigo of central origin. Patients clinical presentation is high medical complexity secondary to ongoing medical management of primary dx, pain, continuous pulse ox monitoring, increased reliance on more restrictive AD than baseline. Upon PT evaluation patient requires S for bed mobility, and transfers and AMB with RW. Patients presents with pain, impaired static/dynamic balance, decreased mobility, decreased activity tolerance compared to baseline, gait deviations. At conclusion of PT session, patient in chair with all needs within reach. Due to patient being S level for all mobility, no further acute PT needs at this time. PT D/C recommendations when medically cleared is  "no post acute rehabilitation needs.   Goals   Patient Goals \"go home\"   Plan   PT Frequency Other (Comment)  (D/C IPPT)   Discharge Recommendation   Rehab Resource Intensity Level, PT No post-acute rehabilitation needs   Equipment Recommended Walker   Walker Package Recommended Wheeled walker   AM-PAC Basic Mobility Inpatient   Turning in Flat Bed Without Bedrails 4   Lying on Back to Sitting on Edge of Flat Bed Without Bedrails 4   Moving Bed to Chair 4   Standing Up From Chair Using Arms 4   Walk in Room 4   Climb 3-5 Stairs With Railing 4   Basic Mobility Inpatient Raw Score 24   Basic Mobility Standardized Score 57.68   [facility] Highest Level Of Mobility   -HLM Goal 8: Walk 250 feet or more   JH-HLM Achieved 7: Walk 25 feet or more   Modified Giorgio Scale   Modified [place] Scale 2   End of Consult   Patient Position at End of Consult Bedside chair;All needs within reach  (per nsg, pt ambulating I in room, no alarm engaged)     Airam Boyle, SPT  "

## 2025-07-20 NOTE — OCCUPATIONAL THERAPY NOTE
"    Occupational Therapy Evaluation     Patient Name: Danny Johnson  Today's Date: 7/20/2025  Problem List  Active Problems:    T2DM (type 2 diabetes mellitus) (HCC)    A-fib (HCC)    Hematoma of left thigh    Fall    Past Medical History  Past Medical History[1]  Past Surgical History  Past Surgical History[2]      07/20/25 1010   OT Last Visit   OT Visit Date 07/20/25   Note Type   Note type Evaluation   Pain Assessment   Pain Assessment Tool 0-10   Pain Score 3   Pain Location/Orientation Orientation: Left;Location: Leg   Hospital Pain Intervention(s) Repositioned;Ambulation/increased activity;Emotional support;Relaxation technique   Restrictions/Precautions   Weight Bearing Precautions Per Order No   Other Precautions Chair Alarm;Bed Alarm;Pain;Hard of hearing   Home Living   Type of Home House   Home Layout One level   Bathroom Shower/Tub Walk-in shower   Bathroom Toilet Standard   Bathroom Equipment Shower chair   Home Equipment Walker;Wheelchair-manual   Prior Function   Level of [place] Independent with ADLs;Independent with functional mobility;Independent with IADLS   Lives With Spouse   Receives Help From Family   IADLs Independent with driving;Independent with meal prep;Independent with medication management   Falls in the last 6 months 1 to 4   Vocational Retired   Lifestyle   Autonomy I adls and mobility w/o ad - i iadls -shares homemaking with family   Reciprocal Relationships supportive family - reports spouse is able to provide assist PRN   Service to Others retired   Intrinsic Gratification active pta   Subjective   Subjective \"I'm doing better\"   ADL   Eating Assistance 7  Independent   Grooming Assistance 5  Supervision/Setup   UB Bathing Assistance 5  Supervision/Setup   LB Bathing Assistance 5  Supervision/Setup   UB Dressing Assistance 5  Supervision/Setup   LB Dressing Assistance 5  Supervision/Setup   Toileting Assistance  5  Supervision/Setup   Bed Mobility   Supine to Sit 5  " Supervision   Transfers   Sit to Stand 5  Supervision   Stand to Sit 5  Supervision   Functional Mobility   Functional Mobility 5  Supervision   Additional items Rolling walker   Balance   Static Sitting Good   Dynamic Sitting Fair +   Static Standing Fair +   Dynamic Standing Fair   Ambulatory Fair -   Activity Tolerance   Activity Tolerance Patient limited by pain   Medical Staff Made Aware PT present for co-eval 2* medical complexity, comorbidities and limited overall tolerance to activities   RUE Assessment   RUE Assessment WFL   LUE Assessment   LUE Assessment WFL   Cognition   Overall Cognitive Status WFL   Assessment   Limitation Decreased endurance;Decreased self-care trans;Decreased high-level ADLs   Prognosis Good   Assessment Pt is a 70 y.o. male who was admitted to [facility] on 7/18/2025 with L thigh hematoma s/p fall while working on [employer] . Patient  has a past medical history of Arthritis, Atrial fibrillation (HCC), Candidiasis, cutaneous, Colon polyp, Colon, diverticulosis, CPAP (continuous positive airway pressure) dependence, Depression, Diabetes mellitus (HCC), Diverticulosis, GERD (gastroesophageal reflux disease), Hyperlipidemia, Hypertension, Internal hemorrhoids, Irregular heart beat, Pneumonia, Shortness of breath, Sleep apnea, and Vertigo of central origin.   At baseline pt was completing adls and mobility independently - I iadls - shares homemaking with spouse. Pt lives with spouse in raised ranch home with stair glide from lower level garage entry to main living area. Currently pt requires sba for overall ADLS and sba for functional mobility/transfers. Pt currently presents with impairments in the following categories -difficulty performing IADLS  and environment endurance and standing balance/tolerance. These impairments, as well as pt's fatigue, pain, and home environment  limit pt's ability to safely engage in all baseline areas of occupation, includingfunctional  mobility/transfers, community mobility, laundry , driving, house maintenance, meal prep, cleaning, social participation , and leisure activities  however has supportive family who are able to provide support PRN - From OT standpoint, recommend Level IV resources upon D/C. No immediate acute OT needs indicated at this time - anticipate d/c home with family support when medically cleared- d/c from caseload   Goals   Patient Goals go home   Plan   OT Frequency Eval only   Discharge Recommendation   Rehab Resource Intensity Level, OT No post-acute rehabilitation needs   AM-PAC Daily Activity Inpatient   Lower Body Dressing 4   Bathing 4   Toileting 4   Upper Body Dressing 4   Grooming 4   Eating 4   Daily Activity Raw Score 24   Daily Activity Standardized Score (Calc for Raw Score >=11) 57.54   AM-PAC Applied Cognition Inpatient   Following a Speech/Presentation 4   Understanding Ordinary Conversation 4   Taking Medications 4   Remembering Where Things Are Placed or Put Away 4   Remembering List of 4-5 Errands 4   Taking Care of Complicated Tasks 4   Applied Cognition Raw Score 24   Applied Cognition Standardized Score 62.21   End of Consult   Education Provided Yes   Patient Position at End of Consult Bedside chair;Bed/Chair alarm activated;All needs within reach   Nurse Communication Nurse aware of consult       The patient's raw score on the AM-PAC Daily Activity Inpatient Short Form is 24. A raw score of greater than or equal to 19 suggests the patient may benefit from discharge to home. Please refer to the recommendation of the Occupational Therapist for safe discharge planning.    Documentation Completed By:    ELADIO Lopez/L  [employer] Certified - [id number]           [1]   Past Medical History:  Diagnosis Date    Arthritis     Atrial fibrillation (HCC)     Candidiasis, cutaneous     Colon polyp     Colon, diverticulosis     CPAP (continuous positive airway pressure) dependence     Depression      Diabetes mellitus (HCC)     Diverticulosis     GERD (gastroesophageal reflux disease)     Hyperlipidemia     Hypertension     Internal hemorrhoids     Irregular heart beat     Pneumonia     Shortness of breath     Sleep apnea     uses cpap    Vertigo of central origin    [2]   Past Surgical History:  Procedure Laterality Date    CARDIAC ELECTROPHYSIOLOGY MAPPING AND ABLATION      CARDIAC ELECTROPHYSIOLOGY PROCEDURE N/A 6/4/2025    Procedure: Cardiac eps/afib ablation PFA;  Surgeon: Jostin Botello MD;  Location:  CARDIAC CATH LAB;  Service: Cardiology    COLONOSCOPY      EGD AND COLONOSCOPY      NV COLONOSCOPY FLX DX W/COLLJ SPEC WHEN PFRMD N/A 8/29/2018    Procedure: COLONOSCOPY;  Surgeon: Praveen Parrish MD;  Location: [facility];  Service: Gastroenterology    NV ESOPHAGOGASTRODUODENOSCOPY TRANSORAL DIAGNOSTIC N/A 10/10/2017    Procedure: ESOPHAGOGASTRODUODENOSCOPY (EGD);  Surgeon: Praveen Parrish MD;  Location: [facility];  Service: Gastroenterology    TONSILLECTOMY

## 2025-07-20 NOTE — CASE MANAGEMENT
Case Management Assessment & Discharge Planning Note    Patient name Danny Johnson  Location [facility]-01 MRN [id number]  : 1955 Date 2025       Current Admission Date: 2025  Current Admission Diagnosis:Hematoma of left thigh   Patient Active Problem List    Diagnosis Date Noted    Hematoma of left thigh 2025    Fall 2025    CPAP (continuous positive airway pressure) dependence 2025    S/P afib ablation 2025    Chronic left shoulder pain 2025    LUKE (obstructive sleep apnea) 2025    Achilles tendinitis of right lower extremity 2024    Skin lesion of cheek 11/10/2023    Mild episode of recurrent major depressive disorder (HCC) 2022    Rhinitis medicamentosa 2021    Ocular migraine 2021    Gastroesophageal reflux disease 2020    Chronic vertigo 2020    Class 1 obesity due to excess calories with serious comorbidity and body mass index (BMI) of 32.0 to 32.9 in adult 2020    Diastasis recti 2020    Chronic anticoagulation 2020    A-fib (HCC) 2020    Mixed hyperlipidemia 2020    Anxiety 2017    T2DM (type 2 diabetes mellitus) (HCC) 2017    Benign essential hypertension 2014      LOS (days): 1  Geometric Mean LOS (GMLOS) (days):   Days to GMLOS:     OBJECTIVE:    Risk of Unplanned Readmission Score: 12.05    Current admission status: Inpatient   Preferred Pharmacy:   [id number] - [address] - [address]  [facility] [address]  Phone: [phone] Fax: [phone]    Primary Care Provider: Sandra Rosenthal MD    Primary Insurance: AISHA  REP  Secondary Insurance:     ASSESSMENT:  Active Health Care Proxies       Camelia Johnson Health Care Agent - Spouse   Primary Phone: [phone] (Home)                 Advance Directives  Does patient have a Health Care POA?: Yes  Does patient  have Advance Directives?: Yes  Advance Directives: Living will, Power of  for health care  Primary Contact: Camelia Johnson per pt.  Readmission Root Cause  30 Day Readmission: No    Patient Information  Admitted from:: Home  Mental Status: Alert  During Assessment patient was accompanied by: Not accompanied during assessment  Assessment information provided by:: Patient  Primary Caregiver: Self  Support Systems: Self, Spouse/significant other  County of Residence: [place]  What [place] you live in[place] [facility]  Home entry access options. Select all that apply.: Stairs  Number of steps to enter home.: 10  Do the steps have railings?: Yes  Type of Current Residence[place]  Is patient a ?: Yes  Is patient active with [facility] ([facility])?: No (Pt would like to become established with [facility] once he is discharged.)    Activities of Daily Living Prior to Admission  Functional Status: Independent  Completes ADLs independently?: Yes  Ambulates independently?: Yes  Does patient use assisted devices?: No  Does patient currently own DME?: Yes  What DME does the patient currently own?: Walker, Wheelchair (Knee scooter)  Does patient have a history of Outpatient Therapy (PT/OT)?: No  Does the patient have a history of Short-Term Rehab?: No  Does patient have a history of HHC?: No  Does patient currently have HHC?: No  Patient Information Continued  Income Source: Pension/senior living  Does patient have prescription coverage?: Yes  Can the patient afford their medications and any related supplies (such as glucometers or test strips)?: N/A  Does patient receive dialysis treatments?: No  Does patient have a history of substance abuse?: No  Does patient have a history of Mental Health Diagnosis?: No    Means of Transportation  Means of Transport to Appts:: Drives Self  DISCHARGE DETAILS:    Discharge planning discussed with:: Patient  Freedom of Choice: Yes  Comments - Freedom of Choice: Discussed  briefly, pending PT/OT recommendations.  Pt said if therapy is recommended, he would like HHC over STR.  CM contacted family/caregiver?: No- see comments     Other Referral/Resources/Interventions Provided:  Referral Comments: Pt admitted s/p fall.  PT/OT notes pending.  CM following.    CM met with pt at bedside. Introduced myself and explained my role. Pt resides in [facility] [place] with wife Camelia, she is MPOA.  Pt is IADL.  DME includes walker, knee scooter and raised toilet seat.  Pt was in [employer], but not receiving [facility] services, but may establish care after DC.  PT/OT notes pending.  Pt would be in agreement for HHC if recommended.  CM following.

## 2025-07-20 NOTE — ASSESSMENT & PLAN NOTE
"- CT LLE w contrast 7/18: \"9.8 x 5.4 x 20 cm heterogeneous collection within the vastus lateralis muscle compatible with intramuscular hematoma. There is a curvilinear hyperdense area within this collection in keeping with active extravasation.\"  - Hemoglobin 11.5 at time of admission; currently 10.4  - If hemoglobin stable on repeat this evening, can start DVT Ppx or resume Xarelto   "

## 2025-07-20 NOTE — PLAN OF CARE
Problem: PAIN - ADULT  Goal: Verbalizes/displays adequate comfort level or baseline comfort level  Description: Interventions:  - Encourage patient to monitor pain and request assistance  - Assess pain using appropriate pain scale  - Administer analgesics as ordered based on type and severity of pain and evaluate response  - Implement non-pharmacological measures as appropriate and evaluate response  - Consider cultural and social influences on pain and pain management  - Notify physician/advanced practitioner if interventions unsuccessful or patient reports new pain  - Educate patient/family on pain management process including their role and importance of  reporting pain   - Provide non-pharmacologic/complimentary pain relief interventions  Outcome: Progressing     Problem: INFECTION - ADULT  Goal: Absence or prevention of progression during hospitalization  Description: INTERVENTIONS:  - Assess and monitor for signs and symptoms of infection  - Monitor lab/diagnostic results  - Monitor all insertion sites, i.e. indwelling lines, tubes, and drains  - Monitor endotracheal if appropriate and nasal secretions for changes in amount and color  - [place] appropriate cooling/warming therapies per order  - Administer medications as ordered  - Instruct and encourage patient and family to use good hand hygiene technique  - Identify and instruct in appropriate isolation precautions for identified infection/condition  Outcome: Progressing  Goal: Absence of fever/infection during neutropenic period  Description: INTERVENTIONS:  - Monitor WBC  - Perform strict hand hygiene  - Limit to healthy visitors only  - No plants, dried, fresh or silk flowers with daly in patient room  Outcome: Progressing

## 2025-07-20 NOTE — ASSESSMENT & PLAN NOTE
Lab Results   Component Value Date    HGBA1C 6.5 (H) 05/07/2025       Recent Labs     07/19/25  1116 07/19/25  1618 07/19/25  2216 07/20/25  0644   POCGLU 105 98 110 108       Blood Sugar Average: Last 72 hrs:  (P) 109.4    - Holding home metformin  - SSI insulin ordered

## 2025-07-21 VITALS
OXYGEN SATURATION: 96 % | HEIGHT: 68 IN | DIASTOLIC BLOOD PRESSURE: 73 MMHG | BODY MASS INDEX: 30.31 KG/M2 | RESPIRATION RATE: 17 BRPM | WEIGHT: 200 LBS | HEART RATE: 63 BPM | SYSTOLIC BLOOD PRESSURE: 155 MMHG | TEMPERATURE: 98.3 F

## 2025-07-21 PROBLEM — R41.3 MEMORY DEFICIT: Status: ACTIVE | Noted: 2025-07-21

## 2025-07-21 PROBLEM — M25.511 RIGHT SHOULDER PAIN: Status: ACTIVE | Noted: 2025-07-21

## 2025-07-21 PROBLEM — G89.11 ACUTE PAIN DUE TO TRAUMA: Status: ACTIVE | Noted: 2025-07-21

## 2025-07-21 PROBLEM — R54 FRAILTY: Status: ACTIVE | Noted: 2025-07-21

## 2025-07-21 LAB
ANION GAP SERPL CALCULATED.3IONS-SCNC: 8 MMOL/L (ref 4–13)
BUN SERPL-MCNC: 15 MG/DL (ref 5–25)
CALCIUM SERPL-MCNC: 8.7 MG/DL (ref 8.4–10.2)
CHLORIDE SERPL-SCNC: 108 MMOL/L (ref 96–108)
CO2 SERPL-SCNC: 27 MMOL/L (ref 21–32)
CREAT SERPL-MCNC: 1.1 MG/DL (ref 0.6–1.3)
ERYTHROCYTE [DISTWIDTH] IN BLOOD BY AUTOMATED COUNT: 15.6 % (ref 11.6–15.1)
GFR SERPL CREATININE-BSD FRML MDRD: 67 ML/MIN/1.73SQ M
GLUCOSE SERPL-MCNC: 102 MG/DL (ref 65–140)
GLUCOSE SERPL-MCNC: 108 MG/DL (ref 65–140)
GLUCOSE SERPL-MCNC: 111 MG/DL (ref 65–140)
GLUCOSE SERPL-MCNC: 87 MG/DL (ref 65–140)
HCT VFR BLD AUTO: 33.7 % (ref 36.5–49.3)
HGB BLD-MCNC: 10.7 G/DL (ref 12–17)
MCH RBC QN AUTO: 28.1 PG (ref 26.8–34.3)
MCHC RBC AUTO-ENTMCNC: 31.8 G/DL (ref 31.4–37.4)
MCV RBC AUTO: 89 FL (ref 82–98)
PLATELET # BLD AUTO: 265 THOUSANDS/UL (ref 149–390)
PMV BLD AUTO: 10.2 FL (ref 8.9–12.7)
POTASSIUM SERPL-SCNC: 4.1 MMOL/L (ref 3.5–5.3)
RBC # BLD AUTO: 3.81 MILLION/UL (ref 3.88–5.62)
SODIUM SERPL-SCNC: 143 MMOL/L (ref 135–147)
WBC # BLD AUTO: 6.38 THOUSAND/UL (ref 4.31–10.16)

## 2025-07-21 PROCEDURE — 99223 1ST HOSP IP/OBS HIGH 75: CPT | Performed by: INTERNAL MEDICINE

## 2025-07-21 PROCEDURE — 80048 BASIC METABOLIC PNL TOTAL CA: CPT

## 2025-07-21 PROCEDURE — 85027 COMPLETE CBC AUTOMATED: CPT

## 2025-07-21 PROCEDURE — NC001 PR NO CHARGE: Performed by: STUDENT IN AN ORGANIZED HEALTH CARE EDUCATION/TRAINING PROGRAM

## 2025-07-21 PROCEDURE — 82948 REAGENT STRIP/BLOOD GLUCOSE: CPT

## 2025-07-21 RX ORDER — OXYCODONE HYDROCHLORIDE 5 MG/1
5 TABLET ORAL EVERY 4 HOURS PRN
Qty: 10 TABLET | Refills: 0 | Status: SHIPPED | OUTPATIENT
Start: 2025-07-21 | End: 2025-07-24

## 2025-07-21 RX ORDER — ACETAMINOPHEN 325 MG/1
975 TABLET ORAL EVERY 8 HOURS SCHEDULED
Start: 2025-07-21

## 2025-07-21 RX ORDER — DILTIAZEM HYDROCHLORIDE 120 MG/1
120 TABLET, FILM COATED ORAL EVERY 12 HOURS
Qty: 60 TABLET | Refills: 0 | Status: SHIPPED | OUTPATIENT
Start: 2025-07-21

## 2025-07-21 RX ADMIN — ESCITALOPRAM OXALATE 10 MG: 10 TABLET ORAL at 09:42

## 2025-07-21 RX ADMIN — FENOFIBRATE 145 MG: 145 TABLET ORAL at 09:42

## 2025-07-21 RX ADMIN — RIVAROXABAN 20 MG: 20 TABLET, FILM COATED ORAL at 11:59

## 2025-07-21 RX ADMIN — ACETAMINOPHEN 975 MG: 325 TABLET ORAL at 05:45

## 2025-07-21 RX ADMIN — ACETAMINOPHEN 975 MG: 325 TABLET ORAL at 13:30

## 2025-07-21 RX ADMIN — DILTIAZEM HYDROCHLORIDE 120 MG: 60 TABLET ORAL at 05:46

## 2025-07-21 RX ADMIN — FLECAINIDE ACETATE 100 MG: 100 TABLET ORAL at 09:43

## 2025-07-21 RX ADMIN — ATORVASTATIN CALCIUM 40 MG: 40 TABLET, FILM COATED ORAL at 09:42

## 2025-07-21 RX ADMIN — PANTOPRAZOLE SODIUM 40 MG: 40 TABLET, DELAYED RELEASE ORAL at 05:46

## 2025-07-21 NOTE — ASSESSMENT & PLAN NOTE
Reports worsening memory  Maintain compliance with CPAP  Recommend check TSH and Vitamin B12  maintain neuro protective lifestyle :   Keep physically, mentally and socially active   Lower BMI   Increase physical exercise   Recommend Mediterranean/MIND diet   Monitor good control of blood pressure, blood sugar and cholestrerol

## 2025-07-21 NOTE — ASSESSMENT & PLAN NOTE
Lab Results   Component Value Date    HGBA1C 6.5 (H) 05/07/2025       Recent Labs     07/20/25  0644 07/20/25  1116 07/20/25  1618 07/21/25  0632   POCGLU 108 91 112 111       Blood Sugar Average: Last 72 hrs:  (P) 107.625    - Holding home metformin  - SSI insulin ordered

## 2025-07-21 NOTE — PLAN OF CARE
Problem: PAIN - ADULT  Goal: Verbalizes/displays adequate comfort level or baseline comfort level  Description: Interventions:  - Encourage patient to monitor pain and request assistance  - Assess pain using appropriate pain scale  - Administer analgesics as ordered based on type and severity of pain and evaluate response  - Implement non-pharmacological measures as appropriate and evaluate response  - Consider cultural and social influences on pain and pain management  - Notify physician/advanced practitioner if interventions unsuccessful or patient reports new pain  - Educate patient/family on pain management process including their role and importance of  reporting pain   - Provide non-pharmacologic/complimentary pain relief interventions  Outcome: Progressing     Problem: INFECTION - ADULT  Goal: Absence or prevention of progression during hospitalization  Description: INTERVENTIONS:  - Assess and monitor for signs and symptoms of infection  - Monitor lab/diagnostic results  - Monitor all insertion sites, i.e. indwelling lines, tubes, and drains  - Monitor endotracheal if appropriate and nasal secretions for changes in amount and color  - [place] appropriate cooling/warming therapies per order  - Administer medications as ordered  - Instruct and encourage patient and family to use good hand hygiene technique  - Identify and instruct in appropriate isolation precautions for identified infection/condition  Outcome: Progressing  Goal: Absence of fever/infection during neutropenic period  Description: INTERVENTIONS:  - Monitor WBC  - Perform strict hand hygiene  - Limit to healthy visitors only  - No plants, dried, fresh or silk flowers with daly in patient room  Outcome: Progressing     Problem: SAFETY ADULT  Goal: Patient will remain free of falls  Description: INTERVENTIONS:  - Educate patient/family on patient safety including physical limitations  - Instruct patient to call for assistance with activity   -  Consider consulting OT/PT to assist with strengthening/mobility based on AM PAC & JH-HLM score  - Consult OT/PT to assist with strengthening/mobility   - Keep Call bell within reach  - Keep bed low and locked with side rails adjusted as appropriate  - Keep care items and personal belongings within reach  - Initiate and maintain comfort rounds  - Make Fall Risk Sign visible to staff  - Apply yellow socks and bracelet for high fall risk patients  - Consider moving patient to room near nurses station  Outcome: Progressing  Goal: Maintain or return to baseline ADL function  Description: INTERVENTIONS:  -  Assess patient's ability to carry out ADLs; assess patient's baseline for ADL function and identify physical deficits which impact ability to perform ADLs (bathing, care of mouth/teeth, toileting, grooming, dressing, etc.)  - Assess/evaluate cause of self-care deficits   - Assess range of motion  - Assess patient's mobility; develop plan if impaired  - Assess patient's need for assistive devices and provide as appropriate  - Encourage maximum independence but intervene and supervise when necessary  - Involve family in performance of ADLs  - Assess for home care needs following discharge   - Consider OT consult to assist with ADL evaluation and planning for discharge  - Provide patient education as appropriate  - Monitor functional capacity and physical performance, use of AM PAC & JH-HLM   - Monitor gait, balance and fatigue with ambulation    Outcome: Progressing  Goal: Maintains/Returns to pre admission functional level  Description: INTERVENTIONS:  - Perform AM-PAC 6 Click Basic Mobility/ Daily Activity assessment daily.  - Set and communicate daily mobility goal to care team and patient/family/caregiver.   - Collaborate with rehabilitation services on mobility goals if consulted  - Out of bed for toileting  - Record patient progress and toleration of activity level   Outcome: Progressing     Problem: DISCHARGE  PLANNING  Goal: Discharge to home or other facility with appropriate resources  Description: INTERVENTIONS:  - Identify barriers to discharge w/patient and caregiver  - Arrange for needed discharge resources and transportation as appropriate  - Identify discharge learning needs (meds, wound care, etc.)  - Arrange for interpretive services to assist at discharge as needed  - Refer to Case Management Department for coordinating discharge planning if the patient needs post-hospital services based on physician/advanced practitioner order or complex needs related to functional status, cognitive ability, or social support system  Outcome: Progressing     Problem: Knowledge Deficit  Goal: Patient/family/caregiver demonstrates understanding of disease process, treatment plan, medications, and discharge instructions  Description: Complete learning assessment and assess knowledge base.  Interventions:  - Provide teaching at level of understanding  - Provide teaching via preferred learning methods  Outcome: Progressing

## 2025-07-21 NOTE — DISCHARGE SUMMARY
"Discharge Summary - Trauma   Name: Danny Johnson 70 y.o. male I MRN: [id number]  Unit/Bed#: [facility] 820-01 I Date of Admission: 7/18/2025   Date of Service: 7/21/2025 I Hospital Day: 2    Assessment & Plan  Fall  - Status post fall with the below noted injuries.  - Fall precautions.  - Geriatric Medicine consultation for evaluation, medication review and recommendations.  - PT and OT evaluation and treatment as indicated.  - Case Management consultation for disposition planning.    Hematoma of left thigh  - CT LLE w contrast 7/18: \"9.8 x 5.4 x 20 cm heterogeneous collection within the vastus lateralis muscle compatible with intramuscular hematoma. There is a curvilinear hyperdense area within this collection in keeping with active extravasation.\"  - Hemoglobin 11.5 at time of admission; currently 10.4  - Xarelto resumed, stable for discharge   A-fib ([facility])  - Resume home Xarelto   - Continue home flecainide and cardizem  T2DM (type 2 diabetes mellitus) ([facility])  Lab Results   Component Value Date    HGBA1C 6.5 (H) 05/07/2025       Recent Labs     07/20/25  0644 07/20/25  1116 07/20/25  1618 07/21/25  0632   POCGLU 108 91 112 111       Blood Sugar Average: Last 72 hrs:  (P) 107.625    - Holding home metformin  - SSI insulin ordered   Mild episode of recurrent major depressive disorder ([facility])    LUKE (obstructive sleep apnea)    Acute pain due to trauma    Frailty    Memory deficit    Right shoulder pain      Admission Date: 7/18/2025 2207  Discharge Date: 07/21/25  Admitting Diagnosis: Fall, initial encounter [W19.XXXA]  Unspecified multiple injuries, initial encounter [T07.XXXA]  Active internal bleeding [R58]  Discharge Diagnosis:   Medical Problems       Resolved Problems  Date Reviewed: 12/26/2023   None         HPI: \"Danny Johnson is a 70 y.o. male who presents after a fall. Patient says that he was working on taking down a chimney. When he was about 4 feet from the ground, he lost control of the machine and " "fell to the ground onto a wheelbarrow's handle. He says he caught himself with his L hand as he was going down and then fell slowly after that. \"And then everything rolled over. \" Denies hitting his head, denies loss of consciousness. He was able to stand right away and started working for about 20 minutes before he noticed swelling in his L thigh. Patient is on xarelto for his afib. \"     Procedures Performed: No orders of the defined types were placed in this encounter.      Summary of Hospital Course:    Patient was admitted for L thigh hematoma. Xarelto was held, compression dressing with Ace wrap applied, and patient had serial hemoglobins. Hemoglobin remained stable. Patient did well with PT/OT and was found to be fully independent for all ADLs, indicating safe discharge for home. Xarelto was resumed and patient was discharged home with return precautions.     Significant Findings, Care, Treatment and Services Provided:   - L thigh hematoma on CT LLE  - LLE with abnormal appearing fem/pop veins warranting LLE venous duplex which was normal.   - Trauma evaluation  - Serial hemoglobins   - PT/OT evaluation    Complications: non    Discharge Day Visit / Exam:   /61   Pulse 64   Temp 98.4 °F (36.9 °C)   Resp 16   Ht 5' 8\" (1.727 m)   Wt 90.7 kg (200 lb)   SpO2 94%   BMI 30.41 kg/m²   Physical Exam  Constitutional:       Appearance: Normal appearance.   HENT:      Head: Normocephalic and atraumatic.      Nose: Nose normal.     Eyes:      Pupils: Pupils are equal, round, and reactive to light.       Cardiovascular:      Rate and Rhythm: Normal rate.   Pulmonary:      Effort: Pulmonary effort is normal.   Abdominal:      General: Abdomen is flat.      Palpations: Abdomen is soft.     Musculoskeletal:      Comments: LLE with ace wrap applied over thigh  Compartments soft   No obviously palpable hematoma   Strength/sensation BLE 5/5      Skin:     General: Skin is warm and dry.      Capillary Refill: " Capillary refill takes less than 2 seconds.     Neurological:      General: No focal deficit present.      Mental Status: He is alert and oriented to person, place, and time.          Discussion with Family: Patient has been calling and informing his wife of all updates.     Condition at Discharge: good       Discharge instructions/Information to patient and family:   See After Visit Summary (AVS) for information provided to patient and family.      Provisions for Follow-Up Care:  See after visit summary for information related to follow-up care and any pertinent home health orders.      PCP: Sandra Rosenthal MD    Disposition: Home    Planned Readmission: No     Discharge Medications:  See after visit summary for reconciled discharge medications provided to patient and family.      Discharge Statement:  I have spent a total time of 35 minutes in caring for this patient on the day of the visit/encounter. >30 minutes of time was spent on: Instructions for management, Impressions, Documenting in the medical record, Reviewing / ordering tests, medicine, procedures  , and Communicating with other healthcare professionals .

## 2025-07-21 NOTE — ASSESSMENT & PLAN NOTE
"- CT LLE w contrast 7/18: \"9.8 x 5.4 x 20 cm heterogeneous collection within the vastus lateralis muscle compatible with intramuscular hematoma. There is a curvilinear hyperdense area within this collection in keeping with active extravasation.\"  - Hemoglobin 11.5 at time of admission; currently 10.4  - Xarelto resumed, stable for discharge   "

## 2025-07-21 NOTE — INCIDENTAL FINDINGS
"The following findings require follow up:  Radiographic finding   Finding: \"There is an enlarged prostate gland. There is a fat-containing left inguinal hernia\"    Follow up required: PCP - does not require intervention. Elective repair may be desired.    Follow up should be done within 1 month(s)    Please notify the following clinician to assist with the follow up:   Dr. Madonna Rosenthal    Incidental finding results were discussed with the Patient by Camelia Valdes MD on 07/21/25.   They expressed understanding and all questions answered.  "

## 2025-07-21 NOTE — ASSESSMENT & PLAN NOTE
S/p fall with hematoma  Geriatric pain protocol  Scheduled acetaminophen 975 mg po Q8  Oxycodone 2.5 mg po Q 4 prn moderate pain  Oxycodone 5 mg po Q 4 prn severe pain   Monitor for constipation  Lidoderm patch

## 2025-07-21 NOTE — CONSULTS
Consultation - Geriatric Medicine   Name: Danny Johnson 70 y.o. male I MRN: [id number]  [facility] I Date of Admission: 7/18/2025   Date of Service: 7/21/2025 I Hospital Day: 2   Inpatient consult to Gerontology  Consult performed by: PAULINE Greenfield  Consult ordered by: Dimas Lindsay MD      Physician Requesting Evaluation: Daniel Arroyo MD   Reason for Evaluation / Principal Problem: ISAR greater than 2    Assessment & Plan  T2DM (type 2 diabetes mellitus) (HCC)  Lab Results   Component Value Date    HGBA1C 6.5 (H) 05/07/2025     Maintained on metformin     A-fib (HCC)  S/p ablation 6/4/25  Follows with cardiology   Continue flecainide and xarelto   Hematoma of left thigh  S/p fall  Seen on CT scan  Trend hemoglobin 10.7 (7/21/25)    Mild episode of recurrent major depressive disorder (HCC)  Maintained on escitalopram  LUKE (obstructive sleep apnea)  Doing well with CPAP, reports compliance  Follows with sleep medicine, last OV 5/14/25  Acute pain due to trauma  S/p fall with hematoma  Geriatric pain protocol  Scheduled acetaminophen 975 mg po Q8  Oxycodone 2.5 mg po Q 4 prn moderate pain  Oxycodone 5 mg po Q 4 prn severe pain   Monitor for constipation  Lidoderm patch   Frailty  Clinical Frail Scale: 4- Vulnerable  Not dependent for daily help, symptoms limit activity  Lives with wife, (wife recovering from ankle surgery)  Fall precautions  PT/OT  Memory deficit  Reports worsening memory  Maintain compliance with CPAP  Recommend check TSH and Vitamin B12  maintain neuro protective lifestyle :   Keep physically, mentally and socially active   Lower BMI   Increase physical exercise   Recommend Mediterranean/MIND diet   Monitor good control of blood pressure, blood sugar and cholestrerol     Right shoulder pain  Chronic  Reports uses voltaren get at home   Plans on having surgery in the future      History of Present Illness   Hx and PE limited by: NA  HPI: Danny Johnson is a 70 y.o.  year old male who presents with swelling in his left thigh after a fall. He has afib on xarelto, DM2, HTN, hyperlipidemia, LUKE, GERD, right shoulder pain. Depression.  He was working on taking down a chimney when he fell 4 feet off the ground onto a wheelbarrows handle. He broke his fall with his left hand. He was able to stand and return to work when 20 minutes later he noted swelling to his left thigh.     Prior to arrival he lives at home with his wife.  He is independent with IADLs and ADLs. He has bilateral hearing aids. He reports he does not have children or local support. He plans on finishing taking down and rebuilding the chimney when he is recovered. He is doing it on his own.    Review of Systems   Constitutional:  Negative for unexpected weight change.   HENT:  Positive for hearing loss.    Eyes:  Negative for visual disturbance.   Respiratory:  Negative for cough.    Cardiovascular:  Negative for chest pain.   Gastrointestinal:  Negative for constipation.   Genitourinary:  Negative for difficulty urinating.   Musculoskeletal:  Positive for arthralgias.   Skin:  Negative for color change.   Psychiatric/Behavioral:  Negative for sleep disturbance.            Historical Information   Past Medical History[1]  Past Surgical History[2]  Social History[3]  E-Cigarette/Vaping    E-Cigarette Use Never User      E-Cigarette/Vaping Substances    Nicotine No     THC No     CBD No     Flavoring No     Other No     Unknown No      Family History[4]  Social History[5]    Current Facility-Administered Medications:     acetaminophen (TYLENOL) tablet 975 mg, Q8H DENG    atorvastatin (LIPITOR) tablet 40 mg, Daily    diltiazem (CARDIZEM) tablet 120 mg, Q12H    escitalopram (LEXAPRO) tablet 10 mg, Daily    fenofibrate (TRICOR) tablet 145 mg, Daily    flecainide (TAMBOCOR) tablet 100 mg, BID    HYDROmorphone HCl (DILAUDID) injection 0.2 mg, Q2H PRN    insulin lispro (HumALOG/ADMELOG) 100 units/mL subcutaneous injection 2-12  Units, TID AC **AND** Fingerstick Glucose (POCT), TID AC    lidocaine (LIDODERM) 5 % patch 1 patch, Daily    naloxone (NARCAN) 0.04 mg/mL syringe 0.04 mg, Q1MIN PRN    oxyCODONE (ROXICODONE) split tablet 2.5 mg, Q4H PRN **OR** oxyCODONE (ROXICODONE) IR tablet 5 mg, Q4H PRN    pantoprazole (PROTONIX) EC tablet 40 mg, Early Morning    polyethylene glycol (MIRALAX) packet 17 g, Daily    rivaroxaban (XARELTO) tablet 20 mg, Daily With Breakfast    senna-docusate sodium (SENOKOT S) 8.6-50 mg per tablet 1 tablet, HS  Patient has no known allergies.    Meds/Allergies   Home medication review  [facility] [place]  Flecainide 100 mg po BID, last refill 5/23/25 # 90 days  Escitalopram 10 mg po daily, last refill 4/10/25 # 90 days  Fenofibrate 145 mg po daily, last refill 4/13/25 # 90 days  Metformin 500 mg po BID, last refill 4/9/25 # 90 days  Atorvastatin 40 mg po daily, last refill 3/23/25 # 90 days  Xarelto 20 mg po daily, last refill 4/13/25 # 90 days      [facility]   Omeprazole 40 mg po daily, last refill 6/16/25 # 90 days      Objective :  Temp:  [97.9 °F (36.6 °C)-99 °F (37.2 °C)] 98.7 °F (37.1 °C)  HR:  [56-70] 64  BP: (129-141)/(59-73) 140/62  Resp:  [16-18] 16  SpO2:  [94 %-99 %] 95 %  O2 Device: None (Room air)    Physical Exam  Vitals and nursing note reviewed.   HENT:      Head: Normocephalic.      Nose: No congestion.      Mouth/Throat:      Mouth: Mucous membranes are moist.     Eyes:      General:         Right eye: No discharge.         Left eye: No discharge.       Cardiovascular:      Rate and Rhythm: Normal rate and regular rhythm.      Pulses: Normal pulses.   Pulmonary:      Effort: Pulmonary effort is normal.      Breath sounds: Normal breath sounds.   Abdominal:      General: Abdomen is flat.      Palpations: Abdomen is soft.     Musculoskeletal:         General: Normal range of motion.      Cervical back: Normal range of motion.     Skin:     General: Skin is warm and dry.     Neurological:      Mental  Status: He is alert and oriented to person, place, and time.     Psychiatric:         Mood and Affect: Mood normal.           Lab Results: I have reviewed the following results:CBC/BMP:   .     07/21/25  0607   WBC 6.38   HGB 10.7*   HCT 33.7*      SODIUM 143   K 4.1      CO2 27   BUN 15   CREATININE 1.10   GLUC 102        Imaging Results Review: I reviewed radiology reports from this admission including: procedure reports.  Other Study Results Review: EKG was reviewed.     Therapies:   Basic Mobility Inpatient Raw Score: 20  -HLM Goal: 6: Walk 10 steps or more  JH-HLM Achieved: 6: Walk 10 steps or more      VTE Prophylaxis: Sequential compression device (Venodyne)     Code Status: Level 1 - Full Code      Family and Social Support:   Support Systems: Self; Spouse/significant other  [place] of [place]idence[place]  Discharge planning discussed with:: Patient  Freedom of Choice: Yes      Goals of Care: full code    I have spent a total time of 90 minutes in caring for this patient on the day of the visit/encounter including Diagnostic results, Prognosis, Risks and benefits of tx options, Instructions for management, Patient and family education, Importance of tx compliance, Risk factor reductions, Impressions, Counseling / Coordination of care, Documenting in the medical record, Reviewing/placing orders in the medical record (including tests, medications, and/or procedures), Obtaining or reviewing history  , and Communicating with other healthcare professionals .         [1]   Past Medical History:  Diagnosis Date    Arthritis     Atrial fibrillation (HCC)     Candidiasis, cutaneous     Colon polyp     Colon, diverticulosis     CPAP (continuous positive airway pressure) dependence     Depression     Diabetes mellitus (HCC)     Diverticulosis     GERD (gastroesophageal reflux disease)     Hyperlipidemia     Hypertension     Internal hemorrhoids     Irregular heart beat     Pneumonia      Shortness of breath     Sleep apnea     uses cpap    Vertigo of central origin    [2]   Past Surgical History:  Procedure Laterality Date    CARDIAC ELECTROPHYSIOLOGY MAPPING AND ABLATION      CARDIAC ELECTROPHYSIOLOGY PROCEDURE N/A 2025    Procedure: Cardiac eps/afib ablation PFA;  Surgeon: Jostin Botello MD;  Location:  CARDIAC CATH LAB;  Service: Cardiology    COLONOSCOPY      EGD AND COLONOSCOPY      ND COLONOSCOPY FLX DX W/COLLJ SPEC WHEN PFRMD N/A 2018    Procedure: COLONOSCOPY;  Surgeon: Praveen Parrish MD;  Location: [facility];  Service: Gastroenterology    ND ESOPHAGOGASTRODUODENOSCOPY TRANSORAL DIAGNOSTIC N/A 10/10/2017    Procedure: ESOPHAGOGASTRODUODENOSCOPY (EGD);  Surgeon: Praveen Parrish MD;  Location: [facility];  Service: Gastroenterology    TONSILLECTOMY     [3]   Social History  Tobacco Use    Smoking status: Former     Current packs/day: 0.00     Average packs/day: 1 pack/day for 12.4 years (12.4 ttl pk-yrs)     Types: Cigarettes     Start date: 1962     Quit date: 10/10/1974     Years since quittin.8     Passive exposure: Never    Smokeless tobacco: Never   Vaping Use    Vaping status: Never Used   Substance and Sexual Activity    Alcohol use: Yes     Comment: RARE    Drug use: No    Sexual activity: Not Currently   [4]   Family History  Problem Relation Name Age of Onset    Other Mother          CABG,Hepatic Disorder    Coronary artery disease Mother      Other Father          CABG    Diabetes Father      Cancer Family          Gastrointestinal   [5]   Social History  Tobacco Use    Smoking status: Former     Current packs/day: 0.00     Average packs/day: 1 pack/day for 12.4 years (12.4 ttl pk-yrs)     Types: Cigarettes     Start date: 1962     Quit date: 10/10/1974     Years since quittin.8     Passive exposure: Never    Smokeless tobacco: Never   Vaping Use    Vaping status: Never Used   Substance and Sexual Activity    Alcohol use: Yes     Comment: RARE     Drug use: No    Sexual activity: Not Currently

## 2025-07-21 NOTE — ASSESSMENT & PLAN NOTE
Clinical Frail Scale: 4- Vulnerable  Not dependent for daily help, symptoms limit activity  Lives with wife, (wife recovering from ankle surgery)  Fall precautions  PT/OT

## 2025-07-21 NOTE — DISCHARGE INSTR - AVS FIRST PAGE
You were hospitalized for a hematoma (a collection of bleeding) in your left thigh.   - You can take all your home medications  - If you have light headedness, shortness of breath, chest pain, worsening swelling of the left leg, or worsening leg pain, return to the ER immediately  - You can follow  up with the Trauma Clinic on an as-needed bases.   - Avoid strenuous activity over the next 3-5 days   - You can continue to apply ace wrap for the next 3 days as a gentle compression dressing

## 2025-07-21 NOTE — UTILIZATION REVIEW
Initial Clinical Review    Admission: Date/Time/Statement:   Admission Orders (From admission, onward)       Ordered        07/19/25 0450  Inpatient Admission  Once                          Orders Placed This Encounter   Procedures    Inpatient Admission     Standing Status:   Standing     Number of Occurrences:   1     Level of Care:   Med Surg [16]     Estimated length of stay:   More than 2 Midnights     Certification:   I certify that inpatient services are medically necessary for this patient for a duration of greater than two midnights. See H&P and MD Progress Notes for additional information about the patient's course of treatment.     ED Arrival Information       Expected   7/18/2025     Arrival   7/18/2025 22:07    Acuity   Urgent              Means of arrival   Ambulance    Escorted by   [facility]    Service   Trauma    Admission type   Emergency              Arrival complaint   Trauma             Chief Complaint   Patient presents with    Trauma     See trauma narrator.        Initial Presentation: 70 y.o. male with PMHx including T2DM, AFib, Depressive disorder, LUKE, GERD, chronic vertigo, presented on 7/18/25 initially to [facility] then transferred to [facility], admitted Inpatient status dt Left leg hematoma with active arterial extravasation.  Presented after a fall while working on a chimney, fell from approximately 3 to 4 feet in height and landed on his bilateral lower extremities.  He is anticoagulated on Xarelto and is complaining of significant left lower extremity pain and swelling.  There is an obvious hematoma and effusion in the left distal femur concern for expanding hematoma.  Direct bruising and skin discoloration noted.  Patient states that he is unable to ambulate with significant knee and femur pain. Imaging revealed left leg hematoma with active arterial extravasation. Transferred to [facility] for further eval by Trauma.    Plan:  Admit to med  surg :  pressure dsg on left thigh, hold AC, start freq neuro checks, possible OR for fasciotomy. Geriatrics consult. Continue pain control, order PT OT eval, fu on labs, clear liquid diet for now, start bowel regimen, CM consult for dispo planning.      Date: 7/20  Day 3: Has surpassed a 2nd midnight with active treatments and services. Has some pain to LLE but overall says the leg feels softer than it did before. No current complaints. Continue to monitor neuro checks, VS, labs, FU on PT OT eval, CM following, continue pain control. Monitor blood sugars and start SSI. Hold Xarelto.     ED Treatment-Medication Administration from 07/18/2025 1908 to 07/19/2025 0752         Date/Time Order Dose Route Action     07/19/2025 0546 diltiazem (CARDIZEM) tablet 120 mg 120 mg Oral Given     07/19/2025 0546 pantoprazole (PROTONIX) EC tablet 40 mg 40 mg Oral Given     07/19/2025 0550 oxyCODONE (ROXICODONE) split tablet 2.5 mg -- Oral See Alternative     07/19/2025 0550 oxyCODONE (ROXICODONE) IR tablet 5 mg 5 mg Oral Given            Scheduled Medications:  acetaminophen, 975 mg, Oral, Q8H DENG  atorvastatin, 40 mg, Oral, Daily  diltiazem, 120 mg, Oral, Q12H  escitalopram, 10 mg, Oral, Daily  fenofibrate, 145 mg, Oral, Daily  flecainide, 100 mg, Oral, BID  insulin lispro, 2-12 Units, Subcutaneous, TID AC  lidocaine, 1 patch, Topical, Daily  pantoprazole, 40 mg, Oral, Early Morning  polyethylene glycol, 17 g, Oral, Daily  rivaroxaban, 20 mg, Oral, Daily With Breakfast  senna-docusate sodium, 1 tablet, Oral, HS      Continuous IV Infusions: none     PRN Meds:  HYDROmorphone, 0.2 mg, Intravenous, Q2H PRN  naloxone, 0.04 mg, Intravenous, Q1MIN PRN  oxyCODONE, 2.5 mg, Oral, Q4H PRN x2   Or  oxyCODONE, 5 mg, Oral, Q4H PRN x1      ED Triage Vitals   Temperature Pulse Respirations Blood Pressure SpO2 Pain Score   07/19/25 0652 07/18/25 2210 07/18/25 2210 07/18/25 2210 07/18/25 2210 07/18/25 2210   98.2 °F (36.8 °C) 61 16 (!) 178/77 96 %  5     Weight (last 2 days)       Date/Time Weight    07/19/25 07:56:28 90.7 (200)            Vital Signs (last 3 days)       Date/Time Temp Pulse Resp BP MAP (mmHg) SpO2 O2 Device [place] Coma Scale Score Pain    07/21/25 08:35:56 98.7 °F (37.1 °C) 64 16 140/62 88 95 % -- -- --    07/21/25 0545 -- -- -- -- -- -- -- -- No Pain    07/21/25 03:05:25 97.9 °F (36.6 °C) 59 16 137/73 94 97 % -- -- --    07/20/25 2313 -- -- -- -- -- -- None (Room air) 15 No Pain    07/20/25 22:39:29 98.9 °F (37.2 °C) 56 18 133/64 87 95 % -- -- --    07/20/25 2118 -- -- -- -- -- -- -- -- No Pain    07/20/25 18:58:52 98.6 °F (37 °C) 59 18 141/67 92 97 % -- -- --    07/20/25 17:05:28 -- 70 -- 141/70 94 99 % -- 15 --    07/20/25 1703 -- -- -- 141/70 -- -- -- -- --    07/20/25 15:14:06 99 °F (37.2 °C) 65 16 131/59 83 96 % -- -- --    07/20/25 1412 -- -- -- -- -- -- -- -- 3    07/20/25 1409 -- -- -- -- -- -- -- -- 3    07/20/25 1200 -- -- -- -- -- -- -- 15 --    07/20/25 11:20:15 98.6 °F (37 °C) 60 18 129/61 84 94 % -- -- --    07/20/25 1012 -- -- -- -- -- -- -- -- 5 07/20/25 1010 -- -- -- -- -- -- -- -- 3    07/20/25 1001 -- -- -- -- -- -- -- -- 5    07/20/25 0800 -- -- -- -- -- -- None (Room air) 15 --    07/20/25 06:46:35 97.7 °F (36.5 °C) 59 16 124/64 84 96 % -- -- --    07/20/25 05:09:11 -- 59 -- 134/70 91 95 % -- -- --    07/20/25 02:46:55 98.1 °F (36.7 °C) 57 18 134/71 92 94 % -- -- --    07/19/25 2209 -- -- -- -- -- -- -- -- Med Not Given for Pain - for MAR use only    07/19/25 21:33:14 97.6 °F (36.4 °C) 56 18 142/73 96 99 % -- -- --    07/19/25 2040 -- -- -- -- -- -- None (Room air) 15 No Pain    07/19/25 19:17:13 97.6 °F (36.4 °C) 63 16 162/72 102 97 % -- -- --    07/19/25 15:24:22 98.4 °F (36.9 °C) 59 16 145/63 90 94 % -- -- --    07/19/25 1523 -- -- -- -- -- -- -- 15 --    07/19/25 1332 -- -- -- -- -- -- -- -- 3    07/19/25 11:19:45 98.4 °F (36.9 °C) 57 18 144/63 90 98 % -- 15 --    07/19/25 0900 -- -- -- -- -- -- -- -- 2     07/19/25 07:56:28 98.1 °F (36.7 °C) 64 16 152/67 95 96 % -- -- --    07/19/25 0756 -- -- -- -- -- -- -- 15 --    07/19/25 0700 -- 56 22 132/59 85 92 % -- 15 4    07/19/25 0652 98.2 °F (36.8 °C) -- -- -- -- -- -- -- --    07/19/25 0550 -- -- -- -- -- -- -- -- 7    07/19/25 0546 -- -- -- 178/68 -- -- -- -- --    07/19/25 0500 -- 62 24 178/68 98 96 % -- -- --    07/19/25 0410 -- 56 21 156/73 -- 94 % -- 15 --    07/19/25 0310 -- 54 19 143/69 -- 94 % -- 15 --    07/19/25 0210 -- 58 21 142/67 -- 93 % -- 15 --    07/19/25 0110 -- 60 18 154/69 -- 94 % -- 15 --    07/19/25 0010 -- 58 21 143/67 -- 94 % -- 15 --    07/18/25 2310 -- 62 22 146/68 -- 93 % -- 15 --    07/18/25 22:11:10 -- 61 19 178/77 -- 96 % -- -- --    07/18/25 2210 -- 61 16 178/77 -- 96 % None (Room air) 15 5              Pertinent Labs/Diagnostic Test Results:   Radiology:  VAS VENOUS DUPLEX -LOWER LIMB UNILATERAL   Final Interpretation by Jac Stahl DO (07/19 1108)        Cardiology:  No orders to display     GI:  No orders to display           Results from last 7 days   Lab Units 07/21/25  0607 07/20/25  1808 07/20/25  0500 07/19/25  1327 07/18/25  1710   WBC Thousand/uL 6.38  --  6.27  --  10.59*   HEMOGLOBIN g/dL 10.7* 10.9* 10.4* 10.7* 11.5*   HEMATOCRIT % 33.7* 35.2* 33.2* 33.8* 35.3*   PLATELETS Thousands/uL 265  --  246  --  295   TOTAL NEUT ABS Thousands/µL  --   --  3.34  --  8.66*         Results from last 7 days   Lab Units 07/21/25  0607 07/20/25  0500 07/18/25  1710   SODIUM mmol/L 143 141 140   POTASSIUM mmol/L 4.1 4.2 4.0   CHLORIDE mmol/L 108 107 107   CO2 mmol/L 27 28 26   ANION GAP mmol/L 8 6 7   BUN mg/dL 15 18 24   CREATININE mg/dL 1.10 1.21 1.15   EGFR ml/min/1.73sq m 67 60 64   CALCIUM mg/dL 8.7 8.8 9.4     Results from last 7 days   Lab Units 07/18/25  1710   AST U/L 23   ALT U/L 14   ALK PHOS U/L 31*   TOTAL PROTEIN g/dL 6.6   ALBUMIN g/dL 4.3   TOTAL BILIRUBIN mg/dL 0.46     Results from last 7 days   Lab Units 07/21/25  0632  07/20/25  1618 07/20/25  1116 07/20/25  0644 07/19/25  2216 07/19/25  1618 07/19/25  1116 07/19/25  0738   POC GLUCOSE mg/dl 111 112 91 108 110 98 105 126     Results from last 7 days   Lab Units 07/21/25  0607 07/20/25  0500 07/18/25  1710   GLUCOSE RANDOM mg/dL 102 104 94     Results from last 7 days   Lab Units 07/18/25  1710   PROTIME seconds 24.7*   INR  2.15*   PTT seconds 28     Past Medical History[1]  Present on Admission:   A-fib (HCC)   T2DM (type 2 diabetes mellitus) (HCC)   Mild episode of recurrent major depressive disorder (HCC)   LUKE (obstructive sleep apnea)      Admitting Diagnosis: Fall, initial encounter [W19.XXXA]  Unspecified multiple injuries, initial encounter [T07.XXXA]  Active internal bleeding [R58]  Age/Sex: 70 y.o. male    Network Utilization Review Department  ATTENTION: Please call with any questions or concerns to [phone] and carefully listen to the prompts so that you are directed to the right person. All voicemails are confidential.   For Discharge needs, contact Care Management DC Support Team at [phone] opt. 2  Send all requests for admission clinical reviews, approved or denied determinations and any other requests to dedicated fax number below belonging to the campus where the patient is receiving treatment. List of dedicated fax numbers for the Facilities:  FACILITY NAME UR FAX NUMBER   ADMISSION DENIALS (Administrative/Medical Necessity) [phone]   DISCHARGE SUPPORT TEAM (NETWORK) [phone]   PARENT CHILD HEALTH (Maternity/NICU/Pediatrics) [phone]   [facility] [phone]   [facility] [phone]   [facility] [phone]   [facility] [phone]   [facility] [phone]   [facility] [phone]   [facility] [phone]   [facility]  [place] [phone]   [facility] [phone]   [facility] [phone]   [facility] [phone]   [facility] [phone]              [1]   Past Medical History:  Diagnosis Date    Arthritis     Atrial fibrillation (HCC)     Candidiasis, cutaneous     Colon polyp     Colon, diverticulosis     CPAP (continuous positive airway pressure) dependence     Depression     Diabetes mellitus (HCC)     Diverticulosis     GERD (gastroesophageal reflux disease)     Hyperlipidemia     Hypertension     Internal hemorrhoids     Irregular heart beat     Pneumonia     Shortness of breath     Sleep apnea     uses cpap    Vertigo of central origin

## 2025-07-21 NOTE — RESTORATIVE TECHNICIAN NOTE
Restorative Technician Note      Patient Name: Danny STORM Elizabeth     Restorative Tech Visit Date: 07/21/25  Note Type: Mobility  Patient Position Upon Consult: Supine  Activity Performed: Ambulated  Assistive Device: Other (Comment) (No AD)  Patient Position at End of Consult: Bedside chair; All needs within reach  Nurse Communication: -- (Nurse aware of consult.)    Brandy Goff, Restorative Tech

## 2025-07-21 NOTE — UTILIZATION REVIEW
NOTIFICATION OF INPATIENT ADMISSION   AUTHORIZATION REQUEST   SERVICING FACILITY:   [facility]  Address: [address] [place] [address]  Tax ID: [id number]  NPI: [id number] ATTENDING PROVIDER:  Attending Name and NPI#: Daniel Arroyo Md [[id number]]  Address: [address] [place] [address]  Phone: [phone]   ADMISSION INFORMATION:  Place of Service: Inpatient [facility]  Place of Service Code: 21  Inpatient Admission Date/Time: 7/19/25  4:50 AM  Discharge Date/Time: No discharge date for patient encounter.  Admitting Diagnosis Code/Description:  Fall, initial encounter [W19.XXXA]  Unspecified multiple injuries, initial encounter [T07.XXXA]  Active internal bleeding [R58]     UTILIZATION REVIEW CONTACT:  Gareth Loya Utilization   Network Utilization Review Department  Phone: [phone]  Fax: [phone]  Email: [email or web address]  Contact for approvals/pending authorizations, clinical reviews, and discharge.     PHYSICIAN ADVISORY SERVICES:  Medical Necessity Denial & [employer] Review  Phone: [phone]  Fax: [phone]  Email: [email or web address]     DISCHARGE SUPPORT TEAM:  For Patients Discharge Needs & Updates  Phone: [phone] opt. 2 Fax: [phone]  Email: [email or web address]

## 2025-07-22 ENCOUNTER — TRANSITIONAL CARE MANAGEMENT (OUTPATIENT)
Dept: FAMILY MEDICINE CLINIC | Facility: CLINIC | Age: 70
End: 2025-07-22

## 2025-07-22 NOTE — UTILIZATION REVIEW
NOTIFICATION OF ADMISSION DISCHARGE   This is a Notification of Discharge from [facility]. Please be advised that this patient has been discharge from our facility. Below you will find the admission and discharge date and time including the patient’s disposition.   UTILIZATION REVIEW CONTACT:  Utilization Review Assistants  Network Utilization Review Department  Phone: [phone] x carefully listen to the prompts. All voicemails are confidential.  Email: [email or web address]     ADMISSION INFORMATION  PRESENTATION DATE: 7/18/2025 10:07 PM  OBERVATION ADMISSION DATE: N/A  INPATIENT ADMISSION DATE: 7/19/25  4:50 AM   DISCHARGE DATE: 7/21/2025  5:12 PM   DISPOSITION:Home/Self Care    Network Utilization Review Department  ATTENTION: Please call with any questions or concerns to [phone] and carefully listen to the prompts so that you are directed to the right person. All voicemails are confidential.   For Discharge needs, contact Care Management DC Support Team at [phone] opt. 2  Send all requests for admission clinical reviews, approved or denied determinations and any other requests to dedicated fax number below belonging to the campus where the patient is receiving treatment. List of dedicated fax numbers for the Facilities:  FACILITY NAME UR FAX NUMBER   ADMISSION DENIALS (Administrative/Medical Necessity) [phone]   DISCHARGE SUPPORT TEAM ([facility]) [phone]   PARENT CHILD HEALTH (Maternity/NICU/Pediatrics) [phone]   [facility] [phone]   [facility] [phone]   [facility] [phone]   [facility] [phone]   [facility] [phone]   [facility] [phone]   [facility] [phone]   [facility] [phone]   [facility]  [facility] [phone]   [facility] [phone]   General acute hospital [phone]   [facility] [phone]

## 2025-07-23 DIAGNOSIS — I48.91 ATRIAL FIBRILLATION, UNSPECIFIED TYPE (HCC): ICD-10-CM

## 2025-07-23 DIAGNOSIS — E11.9 CONTROLLED TYPE 2 DIABETES MELLITUS WITHOUT COMPLICATION, WITHOUT LONG-TERM CURRENT USE OF INSULIN (HCC): ICD-10-CM

## 2025-07-24 ENCOUNTER — APPOINTMENT (OUTPATIENT)
Dept: RADIOLOGY | Facility: CLINIC | Age: 70
End: 2025-07-24
Attending: NURSE PRACTITIONER
Payer: COMMERCIAL

## 2025-07-24 ENCOUNTER — OFFICE VISIT (OUTPATIENT)
Dept: URGENT CARE | Facility: CLINIC | Age: 70
End: 2025-07-24
Payer: COMMERCIAL

## 2025-07-24 VITALS
RESPIRATION RATE: 18 BRPM | HEART RATE: 89 BPM | SYSTOLIC BLOOD PRESSURE: 134 MMHG | OXYGEN SATURATION: 96 % | TEMPERATURE: 98.3 F | DIASTOLIC BLOOD PRESSURE: 62 MMHG

## 2025-07-24 DIAGNOSIS — R05.1 ACUTE COUGH: ICD-10-CM

## 2025-07-24 DIAGNOSIS — J06.9 ACUTE URI: ICD-10-CM

## 2025-07-24 DIAGNOSIS — J20.9 ACUTE BRONCHITIS, UNSPECIFIED ORGANISM: ICD-10-CM

## 2025-07-24 DIAGNOSIS — R11.0 NAUSEA: ICD-10-CM

## 2025-07-24 PROCEDURE — 71046 X-RAY EXAM CHEST 2 VIEWS: CPT

## 2025-07-24 PROCEDURE — 99214 OFFICE O/P EST MOD 30 MIN: CPT | Performed by: NURSE PRACTITIONER

## 2025-07-24 PROCEDURE — S9083 URGENT CARE CENTER GLOBAL: HCPCS | Performed by: NURSE PRACTITIONER

## 2025-07-24 PROCEDURE — 94640 AIRWAY INHALATION TREATMENT: CPT | Performed by: NURSE PRACTITIONER

## 2025-07-24 RX ORDER — ONDANSETRON 4 MG/1
4 TABLET, ORALLY DISINTEGRATING ORAL ONCE
Status: COMPLETED | OUTPATIENT
Start: 2025-07-24 | End: 2025-07-24

## 2025-07-24 RX ORDER — ALBUTEROL SULFATE 90 UG/1
2 INHALANT RESPIRATORY (INHALATION) EVERY 6 HOURS PRN
Qty: 8.5 G | Refills: 0 | Status: SHIPPED | OUTPATIENT
Start: 2025-07-24

## 2025-07-24 RX ORDER — ALBUTEROL SULFATE 0.83 MG/ML
2.5 SOLUTION RESPIRATORY (INHALATION) ONCE
Status: DISCONTINUED | OUTPATIENT
Start: 2025-07-24 | End: 2025-07-24

## 2025-07-24 RX ORDER — ONDANSETRON 4 MG/1
4 TABLET, FILM COATED ORAL EVERY 8 HOURS PRN
Qty: 20 TABLET | Refills: 0 | Status: SHIPPED | OUTPATIENT
Start: 2025-07-24

## 2025-07-24 RX ORDER — BENZONATATE 100 MG/1
100 CAPSULE ORAL 3 TIMES DAILY PRN
Qty: 20 CAPSULE | Refills: 0 | Status: SHIPPED | OUTPATIENT
Start: 2025-07-24

## 2025-07-24 RX ORDER — PREDNISONE 20 MG/1
20 TABLET ORAL DAILY
Qty: 5 TABLET | Refills: 0 | Status: SHIPPED | OUTPATIENT
Start: 2025-07-24 | End: 2025-07-29

## 2025-07-24 RX ORDER — IPRATROPIUM BROMIDE AND ALBUTEROL SULFATE 2.5; .5 MG/3ML; MG/3ML
3 SOLUTION RESPIRATORY (INHALATION) ONCE
Status: COMPLETED | OUTPATIENT
Start: 2025-07-24 | End: 2025-07-24

## 2025-07-24 RX ADMIN — IPRATROPIUM BROMIDE AND ALBUTEROL SULFATE 3 ML: 2.5; .5 SOLUTION RESPIRATORY (INHALATION) at 11:11

## 2025-07-24 RX ADMIN — ONDANSETRON 4 MG: 4 TABLET, ORALLY DISINTEGRATING ORAL at 10:31

## 2025-07-24 NOTE — PATIENT INSTRUCTIONS
COVID swab was negative   Chest x-ray was clear   EKG reveals low voltage but SR with no significant changes - HR 69  Start daily emergen C while symptoms persist   Trial daily antihistamine such as Claritin or zyrtec   Start prednisone daily x 5 days for antiinflammatory properties   PRN motrin/tylenol q8hrs for pain/fever   PRN tessalon pearles for cough q8hrs  PRN zofran for nausea q6-8 hrs  Start albuterol inhaler q6hrs x 24 hrs then drop to PRN  F/u with PCP within 1-2 weeks  Proceed to ER should symptoms worsen

## 2025-07-24 NOTE — PROGRESS NOTES
[facility]  Name: Danny Johnson      : 1955      MRN: [id number]  Encounter Provider: PAULINE Holley  Encounter Date: 2025   Encounter department: [facility] [place]  :  Assessment & Plan  Nausea    Orders:    ondansetron (ZOFRAN-ODT) dispersible tablet 4 mg    ondansetron (ZOFRAN) 4 mg tablet; Take 1 tablet (4 mg total) by mouth every 8 (eight) hours as needed for nausea or vomiting    Acute cough    Orders:    XR chest pa and lateral; Future    ipratropium-albuterol (DUO-NEB) 0.5-2.5 mg/3 mL inhalation solution 3 mL    Acute URI         Acute bronchitis, unspecified organism    Orders:    albuterol (ProAir HFA) 90 mcg/act inhaler; Inhale 2 puffs every 6 (six) hours as needed for wheezing    benzonatate (TESSALON PERLES) 100 mg capsule; Take 1 capsule (100 mg total) by mouth 3 (three) times a day as needed for cough    predniSONE 20 mg tablet; Take 1 tablet (20 mg total) by mouth daily for 5 days      Patient Instructions  COVID swab was negative   Chest x-ray was clear   EKG reveals low voltage but SR with no significant changes - HR 69  Start daily emergen C while symptoms persist   Trial daily antihistamine such as Claritin or zyrtec   Start prednisone daily x 5 days for antiinflammatory properties   PRN motrin/tylenol q8hrs for pain/fever   PRN tessalon pearles for cough q8hrs  PRN zofran for nausea q6-8 hrs  Start albuterol inhaler q6hrs x 24 hrs then drop to PRN  F/u with PCP within 1-2 weeks  Proceed to ER should symptoms worsen     If tests are performed, our office will contact you with results only if changes need to made to the care plan discussed with you at the visit. You can [facility] your full [facility] on [facility].    Chief Complaint:   Chief Complaint   Patient presents with    Cough     Recent hospital admission. C/o cough, congestion, SOB, dizziness, chest is sore from coughing.   S/s yesterday.  Taking oxicodone. Which has been  making him dizzy      History of Present Illness   71 y/o male with PMH of afib on xarelto, bronchitis accompanied by wife presents for cough, SOB, sore throat and fatigue. Patient was recently hospitalized s/p fall for left thigh hematoma and discharged on Monday. His wife feels that he may have picked something up in the hospital. Patient has not tried anything for his symptoms at this time.           Review of Systems   Constitutional:  Positive for fatigue.   HENT:  Positive for sore throat.    Respiratory:  Positive for cough and shortness of breath.    Gastrointestinal:  Positive for nausea.   All other systems reviewed and are negative.    Past Medical History   Past Medical History[1]  Past Surgical History[2]  Family History[3]  he reports that he quit smoking about 50 years ago. His smoking use included cigarettes. He started smoking about 63 years ago. He has a 12.4 pack-year smoking history. He has never been exposed to tobacco smoke. He has never used smokeless tobacco. He reports current alcohol use. He reports that he does not use drugs.  Current Outpatient Medications   Medication Instructions    acetaminophen (TYLENOL) 975 mg, Oral, Every 8 hours scheduled    albuterol (ProAir HFA) 90 mcg/act inhaler 2 puffs, Inhalation, Every 6 hours PRN    atorvastatin (LIPITOR) 40 mg, Oral, Daily    benzonatate (TESSALON PERLES) 100 mg, Oral, 3 times daily PRN    diltiazem (CARDIZEM) 120 mg, Oral, Every 12 hours    escitalopram (LEXAPRO) 10 mg, Oral, Daily    fenofibrate (TRICOR) 145 mg, Oral, Daily    flecainide (TAMBOCOR) 100 mg, Oral, 2 times daily    glucose blood (OneTouch Ultra) test strip 1 each, Other, Daily, Test    metFORMIN (GLUCOPHAGE) 500 mg, Oral, 2 times daily with meals    omeprazole (PRILOSEC) 40 mg, Oral, Daily    ondansetron (ZOFRAN) 4 mg, Oral, Every 8 hours PRN    oxyCODONE (ROXICODONE) 5 mg, Oral, Every 4 hours PRN    predniSONE 20 mg, Oral, Daily    tobramycin (TOBREX) 0.3 % OINT 0.5  "inches, Both Eyes, 3 times daily    Xarelto 20 mg, Oral, Daily with breakfast   Allergies[4]     Objective   /62   Pulse 89   Temp 98.3 °F (36.8 °C)   Resp 18   SpO2 96%      Physical Exam  Constitutional:       Appearance: Normal appearance.   HENT:      Head: Normocephalic and atraumatic.      Right Ear: External ear normal.      Left Ear: External ear normal.      Nose: Nose normal.      Mouth/Throat:      Mouth: Mucous membranes are moist.      Pharynx: Oropharynx is clear. Posterior oropharyngeal erythema present.     Eyes:      Conjunctiva/sclera: Conjunctivae normal.       Cardiovascular:      Rate and Rhythm: Normal rate and regular rhythm.      Pulses: Normal pulses.   Pulmonary:      Effort: Pulmonary effort is normal.      Breath sounds: Examination of the right-lower field reveals decreased breath sounds. Examination of the left-lower field reveals decreased breath sounds. Decreased breath sounds present. No wheezing, rhonchi or rales.   Abdominal:      Palpations: Abdomen is soft.     Musculoskeletal:         General: Normal range of motion.      Cervical back: Normal range of motion.   Lymphadenopathy:      Cervical: No cervical adenopathy.     Skin:     General: Skin is warm and dry.     Neurological:      General: No focal deficit present.      Mental Status: He is alert and oriented to person, place, and time.     Psychiatric:         Behavior: Behavior normal.         Thought Content: Thought content normal.       Mini neb    Performed by: PAULINE Holley  Authorized by: PAULINE Holley    Universal Protocol:  procedure performed by consultantConsent: Verbal consent obtained  Risks and benefits: risks, benefits and alternatives were discussed  Consent given by: patient  Time out: Immediately prior to procedure a \"time out\" was called to verify the correct patient, procedure, equipment, support staff and site/side marked as required.  Patient understanding: patient " "states understanding of the procedure being performed  Patient consent: the patient's understanding of the procedure matches consent given  Procedure consent: procedure consent matches procedure scheduled  Required items: required blood products, implants, devices, and special equipment available  Patient identity confirmed: verbally with patient    Number of treatments:  1  Treatment 1:   Pre-Procedure     Symptoms:  Cough and shortness of breath    Lung Sounds:  Clear but diminished at bases bilalterally, LLL more than right    HR:  69    RR:  20    SP02:  96    Medication Administered:  Duoneb - Albuterol 2.5 mg/Atrovent 0.5 mg  Post-Procedure     Lung sounds:  Clear to auscultation bilaterally    HR:  81    RR:  20    SP02:  97  Nebulizer Comments:  SOB and cough improved post treatment. Patient reports significant improvement.       Portions of the record may have been created with voice recognition software.  Occasional wrong word or \"sound a like\" substitutions may have occurred due to the inherent limitations of voice recognition software.  Read the chart carefully and recognize, using context, where substitutions have occurred.         [1]   Past Medical History:  Diagnosis Date    Arthritis     Atrial fibrillation (HCC)     Candidiasis, cutaneous     Colon polyp     Colon, diverticulosis     CPAP (continuous positive airway pressure) dependence     Depression     Diabetes mellitus (HCC)     Diverticulosis     GERD (gastroesophageal reflux disease)     Hyperlipidemia     Hypertension     Internal hemorrhoids     Irregular heart beat     Pneumonia     Shortness of breath     Sleep apnea     uses cpap    Vertigo of central origin    [2]   Past Surgical History:  Procedure Laterality Date    CARDIAC ELECTROPHYSIOLOGY MAPPING AND ABLATION      CARDIAC ELECTROPHYSIOLOGY PROCEDURE N/A 6/4/2025    Procedure: Cardiac eps/afib ablation PFA;  Surgeon: Jostin Botello MD;  Location: [facility];  Service: " Cardiology    COLONOSCOPY      EGD AND COLONOSCOPY      NE COLONOSCOPY FLX DX W/COLLJ SPEC WHEN PFRMD N/A 8/29/2018    Procedure: COLONOSCOPY;  Surgeon: Praveen Parrish MD;  Location: [facility];  Service: Gastroenterology    NE ESOPHAGOGASTRODUODENOSCOPY TRANSORAL DIAGNOSTIC N/A 10/10/2017    Procedure: ESOPHAGOGASTRODUODENOSCOPY (EGD);  Surgeon: Praveen Parrish MD;  Location: [facility];  Service: Gastroenterology    TONSILLECTOMY     [3]   Family History  Problem Relation Name Age of Onset    Other Mother          CABG,Hepatic Disorder    Coronary artery disease Mother      Other Father          CABG    Diabetes Father      Cancer Family          Gastrointestinal   [4] No Known Allergies

## 2025-07-25 RX ORDER — RIVAROXABAN 20 MG/1
20 TABLET, FILM COATED ORAL
Qty: 90 TABLET | Refills: 0 | Status: SHIPPED | OUTPATIENT
Start: 2025-07-25

## 2025-07-25 RX ORDER — BLOOD SUGAR DIAGNOSTIC
1 STRIP MISCELLANEOUS DAILY
Qty: 100 STRIP | Refills: 1 | Status: SHIPPED | OUTPATIENT
Start: 2025-07-25

## 2025-08-01 ENCOUNTER — OFFICE VISIT (OUTPATIENT)
Dept: FAMILY MEDICINE CLINIC | Facility: CLINIC | Age: 70
End: 2025-08-01
Payer: COMMERCIAL

## 2025-08-01 VITALS
OXYGEN SATURATION: 99 % | BODY MASS INDEX: 30.01 KG/M2 | TEMPERATURE: 98.1 F | DIASTOLIC BLOOD PRESSURE: 70 MMHG | SYSTOLIC BLOOD PRESSURE: 128 MMHG | WEIGHT: 198 LBS | HEART RATE: 74 BPM | HEIGHT: 68 IN

## 2025-08-01 DIAGNOSIS — J01.10 ACUTE NON-RECURRENT FRONTAL SINUSITIS: ICD-10-CM

## 2025-08-01 DIAGNOSIS — N40.0 ENLARGED PROSTATE: ICD-10-CM

## 2025-08-01 DIAGNOSIS — T14.8XXA HEMATOMA: Primary | ICD-10-CM

## 2025-08-01 DIAGNOSIS — Z09 HOSPITAL DISCHARGE FOLLOW-UP: ICD-10-CM
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
## 2025-08-01 PROBLEM — R54 FRAILTY: Status: RESOLVED | Noted: 2025-07-21 | Resolved: 2025-08-01

## 2025-08-01 PROBLEM — M25.511 RIGHT SHOULDER PAIN: Status: RESOLVED | Noted: 2025-07-21 | Resolved: 2025-08-01

## 2025-08-01 PROCEDURE — 99495 TRANSJ CARE MGMT MOD F2F 14D: CPT | Performed by: FAMILY MEDICINE

## 2025-08-01 RX ORDER — AMOXICILLIN 875 MG/1
875 TABLET, COATED ORAL 2 TIMES DAILY
Qty: 14 TABLET | Refills: 0 | Status: SHIPPED | OUTPATIENT
Start: 2025-08-01 | End: 2025-08-08

## 2025-08-08 ENCOUNTER — OFFICE VISIT (OUTPATIENT)
Dept: FAMILY MEDICINE CLINIC | Facility: CLINIC | Age: 70
End: 2025-08-08
Payer: COMMERCIAL

## 2025-08-08 VITALS
WEIGHT: 200 LBS | HEART RATE: 62 BPM | SYSTOLIC BLOOD PRESSURE: 132 MMHG | HEIGHT: 68 IN | OXYGEN SATURATION: 99 % | BODY MASS INDEX: 30.31 KG/M2 | DIASTOLIC BLOOD PRESSURE: 74 MMHG | TEMPERATURE: 97.9 F

## 2025-08-08 DIAGNOSIS — J01.10 ACUTE NON-RECURRENT FRONTAL SINUSITIS: Primary | ICD-10-CM
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
## 2025-08-08 PROCEDURE — 99213 OFFICE O/P EST LOW 20 MIN: CPT | Performed by: FAMILY MEDICINE

## 2025-08-08 PROCEDURE — G2211 COMPLEX E/M VISIT ADD ON: HCPCS | Performed by: FAMILY MEDICINE

## 2025-08-08 RX ORDER — DOXYCYCLINE HYCLATE 100 MG
100 TABLET ORAL 2 TIMES DAILY
Qty: 14 TABLET | Refills: 0 | Status: SHIPPED | OUTPATIENT
Start: 2025-08-08 | End: 2025-08-15

## 2025-08-08 RX ORDER — BENZONATATE 100 MG/1
100 CAPSULE ORAL 3 TIMES DAILY PRN
Qty: 20 CAPSULE | Refills: 0 | Status: SHIPPED | OUTPATIENT
Start: 2025-08-08

## 2025-08-17 DIAGNOSIS — E78.00 HIGH CHOLESTEROL: ICD-10-CM

## 2025-08-17 DIAGNOSIS — E11.9 CONTROLLED TYPE 2 DIABETES MELLITUS WITHOUT COMPLICATION, WITHOUT LONG-TERM CURRENT USE OF INSULIN (HCC): ICD-10-CM

## 2025-08-19 ENCOUNTER — OFFICE VISIT (OUTPATIENT)
Dept: CARDIOLOGY CLINIC | Facility: CLINIC | Age: 70
End: 2025-08-19
Payer: COMMERCIAL

## 2025-08-19 VITALS
SYSTOLIC BLOOD PRESSURE: 136 MMHG | HEART RATE: 61 BPM | WEIGHT: 200 LBS | DIASTOLIC BLOOD PRESSURE: 64 MMHG | HEIGHT: 68 IN | BODY MASS INDEX: 30.31 KG/M2

## 2025-08-19 DIAGNOSIS — I10 BENIGN ESSENTIAL HYPERTENSION: ICD-10-CM

## 2025-08-19 DIAGNOSIS — G47.33 OSA (OBSTRUCTIVE SLEEP APNEA): ICD-10-CM

## 2025-08-19 DIAGNOSIS — I48.0 PAROXYSMAL ATRIAL FIBRILLATION (HCC): Primary | ICD-10-CM

## 2025-08-19 DIAGNOSIS — E66.09 CLASS 1 OBESITY DUE TO EXCESS CALORIES WITH SERIOUS COMORBIDITY AND BODY MASS INDEX (BMI) OF 32.0 TO 32.9 IN ADULT: ICD-10-CM

## 2025-08-19 DIAGNOSIS — E11.9 TYPE 2 DIABETES MELLITUS WITHOUT COMPLICATION, WITHOUT LONG-TERM CURRENT USE OF INSULIN (HCC): ICD-10-CM

## 2025-08-19 DIAGNOSIS — E66.811 CLASS 1 OBESITY DUE TO EXCESS CALORIES WITH SERIOUS COMORBIDITY AND BODY MASS INDEX (BMI) OF 32.0 TO 32.9 IN ADULT: ICD-10-CM

## 2025-08-19 LAB
ATRIAL RATE: 61 BPM
P AXIS: 32 DEGREES
PR INTERVAL: 192 MS
QRS AXIS: -35 DEGREES
QRSD INTERVAL: 110 MS
QT INTERVAL: 444 MS
QTC INTERVAL: 448 MS
T WAVE AXIS: 80 DEGREES
VENTRICULAR RATE: 61 BPM

## 2025-08-19 PROCEDURE — 93000 ELECTROCARDIOGRAM COMPLETE: CPT

## 2025-08-19 PROCEDURE — 99214 OFFICE O/P EST MOD 30 MIN: CPT

## 2025-08-19 RX ORDER — ATORVASTATIN CALCIUM 40 MG/1
40 TABLET, FILM COATED ORAL DAILY
Qty: 100 TABLET | Refills: 1 | Status: SHIPPED | OUTPATIENT
Start: 2025-08-19

## (undated) DEVICE — CABLE CATH CONNECTION FARASTAR

## (undated) DEVICE — CATH ABLATION FARAWAVE PULSED FIELD 31MM

## (undated) DEVICE — OCTA,PERSEID,2-2-2-2-2,D-CURVE: Brand: OCTARAY MAPPING CATHETER

## (undated) DEVICE — 1 X VERSACROSS CONNECT TRANSSEPTAL DILATOR;  1 X VERSACROSS RF WIRE (INCLUDING 1 X CONNECTOR CABLE (SINGLE USE)): Brand: VERSACROSS CONNECT ACCESS SOLUTION FOR FARADRIVE

## (undated) DEVICE — SOUNDSTAR ECO 8F G CATHETER: Brand: SOUNDSTAR

## (undated) DEVICE — PINNACLE INTRODUCER SHEATH: Brand: PINNACLE

## (undated) DEVICE — Device: Brand: REFERENCE PATCH CARTO 3

## (undated) DEVICE — ROSEN CURVED WIRE GUIDE: Brand: ROSEN

## (undated) DEVICE — SHEATH STEERABLE FARADRIVE

## (undated) DEVICE — Device: Brand: WEBSTER CS